# Patient Record
Sex: OTHER/UNKNOWN | Race: WHITE | NOT HISPANIC OR LATINO | Employment: FULL TIME | URBAN - METROPOLITAN AREA
[De-identification: names, ages, dates, MRNs, and addresses within clinical notes are randomized per-mention and may not be internally consistent; named-entity substitution may affect disease eponyms.]

---

## 2020-12-15 LAB — HEP C HIM: NORMAL

## 2021-07-25 ENCOUNTER — OFFICE VISIT (OUTPATIENT)
Dept: URGENT CARE | Facility: URGENT CARE | Age: 26
End: 2021-07-25
Payer: COMMERCIAL

## 2021-07-25 VITALS
OXYGEN SATURATION: 99 % | SYSTOLIC BLOOD PRESSURE: 133 MMHG | DIASTOLIC BLOOD PRESSURE: 87 MMHG | HEART RATE: 96 BPM | TEMPERATURE: 98.7 F | WEIGHT: 185 LBS

## 2021-07-25 DIAGNOSIS — N76.0 BV (BACTERIAL VAGINOSIS): ICD-10-CM

## 2021-07-25 DIAGNOSIS — R30.0 DYSURIA: ICD-10-CM

## 2021-07-25 DIAGNOSIS — N89.8 VAGINAL ITCHING: ICD-10-CM

## 2021-07-25 DIAGNOSIS — B96.89 BV (BACTERIAL VAGINOSIS): ICD-10-CM

## 2021-07-25 DIAGNOSIS — N30.00 ACUTE CYSTITIS WITHOUT HEMATURIA: Primary | ICD-10-CM

## 2021-07-25 LAB
ALBUMIN UR-MCNC: 30 MG/DL
APPEARANCE UR: ABNORMAL
BACTERIA #/AREA URNS HPF: ABNORMAL /HPF
BILIRUB UR QL STRIP: NEGATIVE
CLUE CELLS: PRESENT
COLOR UR AUTO: YELLOW
GLUCOSE UR STRIP-MCNC: NEGATIVE MG/DL
HGB UR QL STRIP: ABNORMAL
KETONES UR STRIP-MCNC: NEGATIVE MG/DL
LEUKOCYTE ESTERASE UR QL STRIP: ABNORMAL
NITRATE UR QL: NEGATIVE
PH UR STRIP: 7 [PH] (ref 5–7)
RBC #/AREA URNS AUTO: ABNORMAL /HPF
SP GR UR STRIP: 1.02 (ref 1–1.03)
TRICHOMONAS, WET PREP: ABNORMAL
UROBILINOGEN UR STRIP-ACNC: 0.2 E.U./DL
WBC #/AREA URNS AUTO: >100 /HPF
WBC CLUMPS #/AREA URNS HPF: PRESENT /HPF
WBC'S/HIGH POWER FIELD, WET PREP: ABNORMAL
YEAST, WET PREP: ABNORMAL

## 2021-07-25 PROCEDURE — 87210 SMEAR WET MOUNT SALINE/INK: CPT | Performed by: PHYSICIAN ASSISTANT

## 2021-07-25 PROCEDURE — 99204 OFFICE O/P NEW MOD 45 MIN: CPT | Performed by: PHYSICIAN ASSISTANT

## 2021-07-25 PROCEDURE — 87186 SC STD MICRODIL/AGAR DIL: CPT | Performed by: PHYSICIAN ASSISTANT

## 2021-07-25 PROCEDURE — 87086 URINE CULTURE/COLONY COUNT: CPT | Performed by: PHYSICIAN ASSISTANT

## 2021-07-25 PROCEDURE — 81001 URINALYSIS AUTO W/SCOPE: CPT | Performed by: PHYSICIAN ASSISTANT

## 2021-07-25 RX ORDER — METRONIDAZOLE 500 MG/1
500 TABLET ORAL 2 TIMES DAILY
Qty: 14 TABLET | Refills: 0 | Status: SHIPPED | OUTPATIENT
Start: 2021-07-25 | End: 2021-08-01

## 2021-07-25 RX ORDER — SULFAMETHOXAZOLE/TRIMETHOPRIM 800-160 MG
1 TABLET ORAL 2 TIMES DAILY
Qty: 14 TABLET | Refills: 0 | Status: SHIPPED | OUTPATIENT
Start: 2021-07-25 | End: 2021-08-01

## 2021-07-25 NOTE — PATIENT INSTRUCTIONS
Patient Education     Bacterial Vaginosis    You have a vaginal infection called bacterial vaginosis (BV). Both good and bad bacteria are present in a healthy vagina. BV occurs when these bacteria get out of balance. The number of bad bacteria increase. And the number of good bacteria decrease. BV is linked with sexual activity, but it's not a sexually transmitted infection (STI).   BV may or may not cause symptoms. If symptoms do occur, they can include:     Thin, gray, milky-white, or sometimes green discharge    Unpleasant odor or  fishy  smell    Itching, burning, or pain in or around the vagina  It is not known what causes BV, but certain factors can make the problem more likely. These can include:     Douching    Spermicides    Use of antibiotics    Change in hormone levels with pregnancy, breastfeeding, or menopause    Having sex with a new partner    Having sex with more than one partner  BV will sometimes go away on its own. But treatment is often advised. This is because untreated BV can raise the risk of more serious health problems such as:     Pelvic inflammatory disease (PID)     delivery (giving birth to a baby early if you re pregnant)    HIV and some other sexually transmitted infections (STIs)    Infection after surgery on the reproductive organs  Home care  General care    BV is most often treated with medicines called antibiotics. These may be given as pills or as a vaginal cream. If antibiotics are prescribed, be sure to use them exactly as directed. And complete all of the medicine, even if your symptoms go away.    Don't douche or having sex during treatment.    If you have sex with a female partner, ask your healthcare provider if she should also be treated.  Prevention    Don't douche.    Don't have sex. If you do have sex, then take steps to lower your risk:  ? Use condoms when having sex.  ? Limit the number of sex partners you have.    Follow-up care  Follow up with your  healthcare provider, or as advised.   When to get medical advice  Call your healthcare provider right away if:     You have a fever of 100.4 F (38 C) or higher, or as directed by your provider.    Your symptoms get worse, or they don t go away within a few days of starting treatment.    You have new pain in the lower belly or pelvic region.    You have side effects that bother you or a reaction to the pills or cream you re prescribed.    You or any of your sex partners have new symptoms, such as a rash, joint pain, or sores.  [employer] last reviewed this educational content on 6/1/2020 2000-2021 [employer]. All rights reserved. This information is not intended as a substitute for professional medical care. Always follow your healthcare professional's instructions.           Patient Education     Bladder Infection, Female (Adult)     Urine normally doesn't have any germs (bacteria) in it. But bacteria can get into the urinary tract from the skin around the rectum. Or they can travel in the blood from other parts of the body. Once they are in your urinary tract, they can cause infection in these areas:    The urethra (urethritis)    The bladder (cystitis)    The kidneys (pyelonephritis)  The most common place for an infection is in the bladder. This is called a bladder infection. This is one of the most common infections in women. Most bladder infections are easily treated. They are not serious unless the infection spreads to the kidney.  The terms bladder infection, UTI, and cystitis are often used to describe the same thing. But they are not always the same. Cystitis is an inflammation of the bladder. The most common cause of cystitis is an infection.  Symptoms  The infection causes inflammation in the urethra and bladder. This causes many of the symptoms. The most common symptoms of a bladder infection are:    Pain or burning when urinating    Having to urinate more often than normal    Urgent need  to urinate    Only a small amount of urine comes out    Blood in urine    Belly (abdominal) discomfort. This is often in the lower belly above the pubic bone.    Cloudy urine    Strong- or bad-smelling urine    Unable to urinate (urinary retention)    Unable to hold urine in (urinary incontinence)    Fever    Loss of appetite    Confusion (in older adults)  Causes  Bladder infections are not contagious. You can't get one from someone else, from a toilet seat, or from sharing a bath.  The most common cause of bladder infections is bacteria from the bowels. The bacteria get onto the skin around the opening of the urethra. From there, they can get into the urine. Then they travel up to the bladder, causing inflammation and infection. This often happens because of:    Wiping incorrectly after urinating. Always wipe from front to back.    Bowel incontinence    Pregnancy    Procedures such as having a catheter put in    Older age    Not emptying your bladder. This can give bacteria a chance to grow in your urine.    Fluid loss (dehydration)    Constipation    Having sex    Using a diaphragm for birth control   Treatment  Bladder infections are diagnosed by a urine test and urine culture. They are treated with antibiotics. They often clear up quickly without problems. Treatment helps prevent a more serious kidney infection.  Medicines  Medicines can help in the treatment of a bladder infection:    Take antibiotics until they are used up, even if you feel better. It's important to finish them to make sure the infection has cleared.    You can use acetaminophen or ibuprofen for pain, fever, or discomfort, unless another medicine was prescribed. If you have long-term (chronic) liver or kidney disease, talk with your healthcare provider before using these medicines. Also talk with your provider if you've ever had a stomach ulcer or GI (gastrointestinal) bleeding, or are taking blood-thinner medicines.    If you are  given phenazopydridine to reduce burning with urination, it will make your urine a bright orange color. This can stain clothing.  Care and prevention  These self-care steps can help prevent future infections:    Drink plenty of fluids. This helps to prevent dehydration and flush out your bladder. Do this unless you must restrict fluids for other health reasons, or your healthcare provider told you not to.    Clean yourself correctly after going to the bathroom. Wipe from front to back after using the toilet. This helps prevent the spread of bacteria.    Urinate more often. Don't try to hold urine in for a long time.    Wear loose-fitting clothes and cotton underwear. Don't wear tight-fitting pants.    Improve your diet and prevent constipation. Eat more fresh fruits and vegetables, and fiber. Eat less junk foods and fatty foods.    Don't have sex until your symptoms are gone.    Don't have caffeine, alcohol, and spicy foods. These can irritate your bladder.    Urinate right after you have sex to flush out your bladder.    If you use birth control pills and have frequent bladder infections, discuss it with your healthcare provider.  Follow-up care  Call your healthcare provider if all symptoms are not gone after 3 days of treatment. This is especially important if you have repeat infections.  If a culture was done, you will be told if your treatment needs to be changed. If directed, you can call to find out the results.  If X-rays were done, you will be told if the results will affect your treatment.  Call 911  Call 911 if any of the following occur:    Trouble breathing    Hard to wake up or confusion    Fainting (loss of consciousness)    Fast heart rate  When to get medical advice  Call your healthcare provider right away if any of these occur:    Fever of 100.4 F (38.0 C) or higher, or as directed by your healthcare provider    Symptoms are not better after 3 days of treatment    Back or belly pain that gets  worse    Repeated vomiting, or unable to keep medicine down    Weakness or dizziness    Vaginal discharge    Pain, redness, or swelling in the outer vaginal area (labia)  [employer] last reviewed this educational content on 11/1/2019 2000-2021 [employer]. All rights reserved. This information is not intended as a substitute for professional medical care. Always follow your healthcare professional's instructions.           Patient Education     Understanding Urinary Tract Infections (UTIs)   Most UTIs are caused by bacteria, but they may also be caused by viruses or fungi. Bacteria from the bowel are the most common source of infection. The infection may start because of any of the following:     Sexual activity.  During sex, bacteria can travel from the penis, vagina, or rectum into the urethra.     Bacteria outside the rectum getting into the urethra.  Bacteria on the skin outside the rectum may travel into the urethra. This is more common in women since the rectum and urethra are closer to each other than in men. Wiping from front to back after using the toilet and keeping the area clean can help prevent germs from getting to the urethra.    Blocked urine flow through the urinary tract. If urine sits too long, germs may start to grow out of control.  Parts of the urinary tract  The infection can occur in any part of the urinary tract.       The kidneys. These organs collect and store urine.    The ureters. These tubes carry urine from the kidneys to the bladder.    The bladder. This holds urine until you are ready to let it out.    The urethra. This tube carries urine from the bladder out of the body. It is shorter in women, so bacteria can move through it more easily. The urethra is longer in men, so a UTI is less likely to reach the bladder or kidneys in men.  [employer] last reviewed this educational content on 1/1/2020 2000-2021 [employer]. All rights reserved. This information  is not intended as a substitute for professional medical care. Always follow your healthcare professional's instructions.

## 2021-07-26 NOTE — PROGRESS NOTES
SUBJECTIVE:   Tato Adler is a 26 year old female who  presents today for a possible UTI. Symptoms of dysuria, urgency, frequency and hesitancy have been going on for 3day(s).  Hematuria no.  still presentand mild.  There is no history of fever, chills, nausea or vomiting.  No history of vaginal or penile discharge. This patient does    have a history of urinary tract infections. Patient denies long duration, rigors, flank pain, temperature > 101 degrees F., Vomiting, significant nausea or diarrhea, taking Coumadin and GFR less than 30 within the last year    No past medical history on file.  Current Outpatient Medications   Medication Sig Dispense Refill     metroNIDAZOLE (FLAGYL) 500 MG tablet Take 1 tablet (500 mg) by mouth 2 times daily for 7 days 14 tablet 0     metroNIDAZOLE (FLAGYL) 500 MG tablet Take 1 tablet (500 mg) by mouth 2 times daily for 7 days 14 tablet 0     sulfamethoxazole-trimethoprim (BACTRIM DS) 800-160 MG tablet Take 1 tablet by mouth 2 times daily for 7 days 14 tablet 0     sulfamethoxazole-trimethoprim (BACTRIM DS) 800-160 MG tablet Take 1 tablet by mouth 2 times daily for 7 days 14 tablet 0     Social History     Tobacco Use     Smoking status: Never Smoker     Smokeless tobacco: Never Used   Substance Use Topics     Alcohol use: Not on file       ROS:   ROS negative except as listed above      OBJECTIVE:  /87   Pulse 96   Temp 98.7  F (37.1  C) (Tympanic)   Wt 83.9 kg (185 lb)   SpO2 99%   GENERAL APPEARANCE: healthy, alert and no distress  RESP: lungs clear to auscultation - no rales, rhonchi or wheezes  CV: regular rates and rhythm, normal S1 S2, no murmur noted  BACK: No CVA tenderness  SKIN: no suspicious lesions or rashes      Results for orders placed or performed in visit on 07/25/21   UA macro with reflex to Microscopic and Culture - [facility] Collect     Status: Abnormal    Specimen: Urine, Clean Catch   Result Value Ref Range    Color Urine Yellow Colorless,  Straw, Light Yellow, Yellow    Appearance Urine Cloudy (A) Clear    Glucose Urine Negative Negative mg/dL    Bilirubin Urine Negative Negative    Ketones Urine Negative Negative mg/dL    Specific Gravity Urine 1.020 1.003 - 1.035    Blood Urine Moderate (A) Negative    pH Urine 7.0 5.0 - 7.0    Protein Albumin Urine 30  (A) Negative mg/dL    Urobilinogen Urine 0.2 0.2, 1.0 E.U./dL    Nitrite Urine Negative Negative    Leukocyte Esterase Urine Large (A) Negative   Urine Microscopic Exam     Status: Abnormal   Result Value Ref Range    Bacteria Urine Few (A) None Seen /HPF    RBC Urine None Seen 0-2 /HPF /HPF    WBC Urine >100 (A) 0-5 /HPF /HPF    WBC Clumps Urine Present (A) None Seen /HPF   Wet prep - Clinic Collect     Status: Abnormal    Specimen: Vagina; Swab   Result Value Ref Range    Trichomonas Absent Absent    Yeast Absent Absent    Clue Cells Present (A) Absent    WBCs/high power field 4+ (A) None       ASSESSMENT:   (N30.00) Acute cystitis without hematuria  (primary encounter diagnosis)  Plan: sulfamethoxazole-trimethoprim (BACTRIM DS)         800-160 MG tablet,         sulfamethoxazole-trimethoprim (BACTRIM DS)         800-160 MG tablet      (R30.0) Dysuria  Plan: UA macro with reflex to Microscopic and Culture        - [facility] Collect, Urine Microscopic Exam, Urine         Culture      (N89.8) Vaginal itching  Plan: Wet prep - Clinic Collect       (N76.0,  B96.89) BV (bacterial vaginosis)  Plan: metroNIDAZOLE (FLAGYL) 500 MG tablet,         metroNIDAZOLE (FLAGYL) 500 MG tablet      Red flags and emergent follow up discussed, and understood by patient  Follow up with PCP if symptoms worsen or fail to improve      Patient Instructions     Patient Education     Bacterial Vaginosis    You have a vaginal infection called bacterial vaginosis (BV). Both good and bad bacteria are present in a healthy vagina. BV occurs when these bacteria get out of balance. The number of bad bacteria increase. And the number of  good bacteria decrease. BV is linked with sexual activity, but it's not a sexually transmitted infection (STI).   BV may or may not cause symptoms. If symptoms do occur, they can include:     Thin, gray, milky-white, or sometimes green discharge    Unpleasant odor or  fishy  smell    Itching, burning, or pain in or around the vagina  It is not known what causes BV, but certain factors can make the problem more likely. These can include:     Douching    Spermicides    Use of antibiotics    Change in hormone levels with pregnancy, breastfeeding, or menopause    Having sex with a new partner    Having sex with more than one partner  BV will sometimes go away on its own. But treatment is often advised. This is because untreated BV can raise the risk of more serious health problems such as:     Pelvic inflammatory disease (PID)     delivery (giving birth to a baby early if you re pregnant)    HIV and some other sexually transmitted infections (STIs)    Infection after surgery on the reproductive organs  Home care  General care    BV is most often treated with medicines called antibiotics. These may be given as pills or as a vaginal cream. If antibiotics are prescribed, be sure to use them exactly as directed. And complete all of the medicine, even if your symptoms go away.    Don't douche or having sex during treatment.    If you have sex with a female partner, ask your healthcare provider if she should also be treated.  Prevention    Don't douche.    Don't have sex. If you do have sex, then take steps to lower your risk:  ? Use condoms when having sex.  ? Limit the number of sex partners you have.    Follow-up care  Follow up with your healthcare provider, or as advised.   When to get medical advice  Call your healthcare provider right away if:     You have a fever of 100.4 F (38 C) or higher, or as directed by your provider.    Your symptoms get worse, or they don t go away within a few days of starting  treatment.    You have new pain in the lower belly or pelvic region.    You have side effects that bother you or a reaction to the pills or cream you re prescribed.    You or any of your sex partners have new symptoms, such as a rash, joint pain, or sores.  [employer] last reviewed this educational content on 6/1/2020 2000-2021 [employer]. All rights reserved. This information is not intended as a substitute for professional medical care. Always follow your healthcare professional's instructions.           Patient Education     Bladder Infection, Female (Adult)     Urine normally doesn't have any germs (bacteria) in it. But bacteria can get into the urinary tract from the skin around the rectum. Or they can travel in the blood from other parts of the body. Once they are in your urinary tract, they can cause infection in these areas:    The urethra (urethritis)    The bladder (cystitis)    The kidneys (pyelonephritis)  The most common place for an infection is in the bladder. This is called a bladder infection. This is one of the most common infections in women. Most bladder infections are easily treated. They are not serious unless the infection spreads to the kidney.  The terms bladder infection, UTI, and cystitis are often used to describe the same thing. But they are not always the same. Cystitis is an inflammation of the bladder. The most common cause of cystitis is an infection.  Symptoms  The infection causes inflammation in the urethra and bladder. This causes many of the symptoms. The most common symptoms of a bladder infection are:    Pain or burning when urinating    Having to urinate more often than normal    Urgent need to urinate    Only a small amount of urine comes out    Blood in urine    Belly (abdominal) discomfort. This is often in the lower belly above the pubic bone.    Cloudy urine    Strong- or bad-smelling urine    Unable to urinate (urinary retention)    Unable to hold urine  in (urinary incontinence)    Fever    Loss of appetite    Confusion (in older adults)  Causes  Bladder infections are not contagious. You can't get one from someone else, from a toilet seat, or from sharing a bath.  The most common cause of bladder infections is bacteria from the bowels. The bacteria get onto the skin around the opening of the urethra. From there, they can get into the urine. Then they travel up to the bladder, causing inflammation and infection. This often happens because of:    Wiping incorrectly after urinating. Always wipe from front to back.    Bowel incontinence    Pregnancy    Procedures such as having a catheter put in    Older age    Not emptying your bladder. This can give bacteria a chance to grow in your urine.    Fluid loss (dehydration)    Constipation    Having sex    Using a diaphragm for birth control   Treatment  Bladder infections are diagnosed by a urine test and urine culture. They are treated with antibiotics. They often clear up quickly without problems. Treatment helps prevent a more serious kidney infection.  Medicines  Medicines can help in the treatment of a bladder infection:    Take antibiotics until they are used up, even if you feel better. It's important to finish them to make sure the infection has cleared.    You can use acetaminophen or ibuprofen for pain, fever, or discomfort, unless another medicine was prescribed. If you have long-term (chronic) liver or kidney disease, talk with your healthcare provider before using these medicines. Also talk with your provider if you've ever had a stomach ulcer or GI (gastrointestinal) bleeding, or are taking blood-thinner medicines.    If you are given phenazopydridine to reduce burning with urination, it will make your urine a bright orange color. This can stain clothing.  Care and prevention  These self-care steps can help prevent future infections:    Drink plenty of fluids. This helps to prevent dehydration and flush out  your bladder. Do this unless you must restrict fluids for other health reasons, or your healthcare provider told you not to.    Clean yourself correctly after going to the bathroom. Wipe from front to back after using the toilet. This helps prevent the spread of bacteria.    Urinate more often. Don't try to hold urine in for a long time.    Wear loose-fitting clothes and cotton underwear. Don't wear tight-fitting pants.    Improve your diet and prevent constipation. Eat more fresh fruits and vegetables, and fiber. Eat less junk foods and fatty foods.    Don't have sex until your symptoms are gone.    Don't have caffeine, alcohol, and spicy foods. These can irritate your bladder.    Urinate right after you have sex to flush out your bladder.    If you use birth control pills and have frequent bladder infections, discuss it with your healthcare provider.  Follow-up care  Call your healthcare provider if all symptoms are not gone after 3 days of treatment. This is especially important if you have repeat infections.  If a culture was done, you will be told if your treatment needs to be changed. If directed, you can call to find out the results.  If X-rays were done, you will be told if the results will affect your treatment.  Call 911  Call 911 if any of the following occur:    Trouble breathing    Hard to wake up or confusion    Fainting (loss of consciousness)    Fast heart rate  When to get medical advice  Call your healthcare provider right away if any of these occur:    Fever of 100.4 F (38.0 C) or higher, or as directed by your healthcare provider    Symptoms are not better after 3 days of treatment    Back or belly pain that gets worse    Repeated vomiting, or unable to keep medicine down    Weakness or dizziness    Vaginal discharge    Pain, redness, or swelling in the outer vaginal area (labia)  [employer] last reviewed this educational content on 11/1/2019 2000-2021 [employer]. All rights  reserved. This information is not intended as a substitute for professional medical care. Always follow your healthcare professional's instructions.           Patient Education     Understanding Urinary Tract Infections (UTIs)   Most UTIs are caused by bacteria, but they may also be caused by viruses or fungi. Bacteria from the bowel are the most common source of infection. The infection may start because of any of the following:     Sexual activity.  During sex, bacteria can travel from the penis, vagina, or rectum into the urethra.     Bacteria outside the rectum getting into the urethra.  Bacteria on the skin outside the rectum may travel into the urethra. This is more common in women since the rectum and urethra are closer to each other than in men. Wiping from front to back after using the toilet and keeping the area clean can help prevent germs from getting to the urethra.    Blocked urine flow through the urinary tract. If urine sits too long, germs may start to grow out of control.  Parts of the urinary tract  The infection can occur in any part of the urinary tract.       The kidneys. These organs collect and store urine.    The ureters. These tubes carry urine from the kidneys to the bladder.    The bladder. This holds urine until you are ready to let it out.    The urethra. This tube carries urine from the bladder out of the body. It is shorter in women, so bacteria can move through it more easily. The urethra is longer in men, so a UTI is less likely to reach the bladder or kidneys in men.  [employer] last reviewed this educational content on 1/1/2020 2000-2021 [employer]. All rights reserved. This information is not intended as a substitute for professional medical care. Always follow your healthcare professional's instructions.

## 2021-07-27 LAB — BACTERIA UR CULT: ABNORMAL

## 2021-07-29 ENCOUNTER — TRANSFERRED RECORDS (OUTPATIENT)
Dept: HEALTH INFORMATION MANAGEMENT | Facility: CLINIC | Age: 26
End: 2021-07-29
Payer: COMMERCIAL

## 2021-07-29 LAB
ALT SERPL-CCNC: 28 U/L (ref 14–59)
AST SERPL-CCNC: 28 U/L (ref 14–59)
C TRACH DNA SPEC QL PROBE+SIG AMP: NEGATIVE
CREATININE (EXTERNAL): 1.14 MG/DL (ref 0.55–1.02)
GFR ESTIMATED (EXTERNAL): >60 ML/MIN/1.73M^2
GFR ESTIMATED (IF AFRICAN AMERICAN) (EXTERNAL): >60 ML/MIN/1.73M^2
GLUCOSE (EXTERNAL): 90 MG/DL (ref 70–124)
HIV 1&2 EXT: NORMAL
N GONORRHOEA DNA SPEC QL PROBE+SIG AMP: NEGATIVE
PAP-ABSTRACT: ABNORMAL
POTASSIUM (EXTERNAL): 4.6 MMOL/L (ref 3.4–5.3)
SPECIMEN DESCRIP: NORMAL
SPECIMEN DESCRIPTION: NORMAL

## 2021-08-03 ENCOUNTER — TRANSFERRED RECORDS (OUTPATIENT)
Dept: HEALTH INFORMATION MANAGEMENT | Facility: CLINIC | Age: 26
End: 2021-08-03
Payer: COMMERCIAL

## 2021-08-06 ENCOUNTER — TRANSFERRED RECORDS (OUTPATIENT)
Dept: HEALTH INFORMATION MANAGEMENT | Facility: CLINIC | Age: 26
End: 2021-08-06
Payer: COMMERCIAL

## 2022-02-14 ENCOUNTER — TRANSFERRED RECORDS (OUTPATIENT)
Dept: HEALTH INFORMATION MANAGEMENT | Facility: CLINIC | Age: 27
End: 2022-02-14
Payer: COMMERCIAL

## 2022-02-14 LAB
C TRACH DNA SPEC QL PROBE+SIG AMP: NEGATIVE
CHOLESTEROL (EXTERNAL): 226 MG/DL
HDLC SERPL-MCNC: 49 MG/DL (ref 35–999)
HIV 1&2 EXT: NORMAL
LDL CHOLESTEROL CALCULATED (EXTERNAL): 149 MG/DL (ref 0–130)
N GONORRHOEA DNA SPEC QL PROBE+SIG AMP: NEGATIVE
SPECIMEN DESCRIP: NORMAL
SPECIMEN DESCRIPTION: NORMAL
TRIGLYCERIDES (EXTERNAL): 141 MG/DL (ref 20–150)
TSH SERPL-ACNC: 3.69 MLU/L (ref 0.4–4.5)

## 2022-02-15 ENCOUNTER — TRANSFERRED RECORDS (OUTPATIENT)
Dept: HEALTH INFORMATION MANAGEMENT | Facility: CLINIC | Age: 27
End: 2022-02-15
Payer: COMMERCIAL

## 2022-03-09 ENCOUNTER — TRANSFERRED RECORDS (OUTPATIENT)
Dept: HEALTH INFORMATION MANAGEMENT | Facility: CLINIC | Age: 27
End: 2022-03-09
Payer: COMMERCIAL

## 2022-04-20 ENCOUNTER — OFFICE VISIT (OUTPATIENT)
Dept: FAMILY MEDICINE | Facility: CLINIC | Age: 27
End: 2022-04-20
Payer: COMMERCIAL

## 2022-04-20 VITALS
DIASTOLIC BLOOD PRESSURE: 80 MMHG | BODY MASS INDEX: 30.01 KG/M2 | SYSTOLIC BLOOD PRESSURE: 124 MMHG | TEMPERATURE: 98.6 F | OXYGEN SATURATION: 94 % | HEIGHT: 67 IN | WEIGHT: 191.2 LBS | HEART RATE: 85 BPM

## 2022-04-20 DIAGNOSIS — F43.0 ACUTE REACTION TO STRESS: ICD-10-CM

## 2022-04-20 DIAGNOSIS — E78.2 MIXED HYPERLIPIDEMIA: ICD-10-CM

## 2022-04-20 DIAGNOSIS — F32.A DEPRESSION, UNSPECIFIED DEPRESSION TYPE: ICD-10-CM

## 2022-04-20 DIAGNOSIS — F64.9 GENDER DYSPHORIA: Primary | ICD-10-CM

## 2022-04-20 LAB
ALBUMIN SERPL-MCNC: 4 G/DL (ref 3.4–5)
ALP SERPL-CCNC: 68 U/L (ref 40–150)
ALT SERPL W P-5'-P-CCNC: 32 U/L (ref 0–70)
ANION GAP SERPL CALCULATED.3IONS-SCNC: 8 MMOL/L (ref 3–14)
AST SERPL W P-5'-P-CCNC: 23 U/L (ref 0–45)
BILIRUB SERPL-MCNC: 0.5 MG/DL (ref 0.2–1.3)
BUN SERPL-MCNC: 13 MG/DL (ref 7–30)
CALCIUM SERPL-MCNC: 9.4 MG/DL (ref 8.5–10.1)
CHLORIDE BLD-SCNC: 107 MMOL/L (ref 94–109)
CHOLEST SERPL-MCNC: 252 MG/DL
CO2 SERPL-SCNC: 26 MMOL/L (ref 20–32)
CREAT SERPL-MCNC: 0.78 MG/DL (ref 0.52–1.25)
FASTING STATUS PATIENT QL REPORTED: ABNORMAL
GFR SERPL CREATININE-BSD FRML MDRD: >90 ML/MIN/1.73M2
GLUCOSE BLD-MCNC: 85 MG/DL (ref 70–99)
HDLC SERPL-MCNC: 68 MG/DL
HGB BLD-MCNC: 16.8 G/DL (ref 11.7–17.7)
LDLC SERPL CALC-MCNC: 130 MG/DL
NONHDLC SERPL-MCNC: 184 MG/DL
POTASSIUM BLD-SCNC: 4.1 MMOL/L (ref 3.4–5.3)
PROT SERPL-MCNC: 7.6 G/DL (ref 6.8–8.8)
SODIUM SERPL-SCNC: 141 MMOL/L (ref 133–144)
TRIGL SERPL-MCNC: 271 MG/DL

## 2022-04-20 PROCEDURE — 36415 COLL VENOUS BLD VENIPUNCTURE: CPT | Performed by: STUDENT IN AN ORGANIZED HEALTH CARE EDUCATION/TRAINING PROGRAM

## 2022-04-20 PROCEDURE — 99214 OFFICE O/P EST MOD 30 MIN: CPT | Performed by: STUDENT IN AN ORGANIZED HEALTH CARE EDUCATION/TRAINING PROGRAM

## 2022-04-20 PROCEDURE — 85018 HEMOGLOBIN: CPT | Performed by: STUDENT IN AN ORGANIZED HEALTH CARE EDUCATION/TRAINING PROGRAM

## 2022-04-20 PROCEDURE — 80053 COMPREHEN METABOLIC PANEL: CPT | Performed by: STUDENT IN AN ORGANIZED HEALTH CARE EDUCATION/TRAINING PROGRAM

## 2022-04-20 PROCEDURE — 80061 LIPID PANEL: CPT | Performed by: STUDENT IN AN ORGANIZED HEALTH CARE EDUCATION/TRAINING PROGRAM

## 2022-04-20 PROCEDURE — 84403 ASSAY OF TOTAL TESTOSTERONE: CPT | Mod: KX | Performed by: STUDENT IN AN ORGANIZED HEALTH CARE EDUCATION/TRAINING PROGRAM

## 2022-04-20 RX ORDER — TESTOSTERONE CYPIONATE 200 MG/ML
70 INJECTION, SOLUTION INTRAMUSCULAR WEEKLY
Qty: 10 ML | Refills: 0 | Status: SHIPPED | OUTPATIENT
Start: 2022-04-20 | End: 2022-05-18

## 2022-04-20 RX ORDER — HYDROXYZINE HYDROCHLORIDE 25 MG/1
TABLET, FILM COATED ORAL
COMMUNITY
Start: 2022-03-08 | End: 2022-11-08

## 2022-04-20 RX ORDER — SERTRALINE HYDROCHLORIDE 100 MG/1
100 TABLET, FILM COATED ORAL DAILY
Qty: 30 TABLET | Refills: 3 | Status: SHIPPED | OUTPATIENT
Start: 2022-04-20 | End: 2022-05-19

## 2022-04-20 RX ORDER — TESTOSTERONE CYPIONATE 200 MG/ML
INJECTION, SOLUTION INTRAMUSCULAR
COMMUNITY
Start: 2018-04-20 | End: 2022-04-20

## 2022-04-20 RX ORDER — MULTIVITAMIN
TABLET ORAL
COMMUNITY

## 2022-04-20 ASSESSMENT — ANXIETY QUESTIONNAIRES
7. FEELING AFRAID AS IF SOMETHING AWFUL MIGHT HAPPEN: SEVERAL DAYS
IF YOU CHECKED OFF ANY PROBLEMS ON THIS QUESTIONNAIRE, HOW DIFFICULT HAVE THESE PROBLEMS MADE IT FOR YOU TO DO YOUR WORK, TAKE CARE OF THINGS AT HOME, OR GET ALONG WITH OTHER PEOPLE: SOMEWHAT DIFFICULT
3. WORRYING TOO MUCH ABOUT DIFFERENT THINGS: SEVERAL DAYS
GAD7 TOTAL SCORE: 7
5. BEING SO RESTLESS THAT IT IS HARD TO SIT STILL: NOT AT ALL
1. FEELING NERVOUS, ANXIOUS, OR ON EDGE: SEVERAL DAYS
2. NOT BEING ABLE TO STOP OR CONTROL WORRYING: MORE THAN HALF THE DAYS
6. BECOMING EASILY ANNOYED OR IRRITABLE: SEVERAL DAYS

## 2022-04-20 ASSESSMENT — PATIENT HEALTH QUESTIONNAIRE - PHQ9
SUM OF ALL RESPONSES TO PHQ QUESTIONS 1-9: 13
5. POOR APPETITE OR OVEREATING: SEVERAL DAYS

## 2022-04-20 NOTE — PATIENT INSTRUCTIONS
Patient Education   Here is the plan from today's visit    Send me your immunization records when you have a chance!     1. Gender dysphoria  Labs today, testosterone refill, gender referral to discuss surgery. If you don't hear from the gender referral, this is the coordinator to call and follow up with:     Sherwin Diaz, they/them  Intake and Referral Coordinator  Com[facility] Gender Care Program   [phone]     - Comprehensive Gender Care Referral - Internal; Future  - testosterone cypionate (DEPOTESTOSTERONE) 200 MG/ML injection; Inject 0.35 mLs (70 mg) into the muscle once a week  Dispense: 10 mL; Refill: 0  - Testosterone Total; Future  - Lipid Cascade; Future  - Comprehensive metabolic panel; Future  - Hemoglobin; Future  - Testosterone Total  - Lipid Cascade  - Comprehensive metabolic panel  - Hemoglobin    2. Depression, unspecified depression type  Dose increase and referral for short-term therapy   - sertraline (ZOLOFT) 100 MG tablet; Take 1 tablet (100 mg) by mouth daily  Dispense: 30 tablet; Refill: 3    3. Acute reaction to stress    4. Mixed hyperlipidemia  We'll check cholesterol levels w T labs           Please call or return to clinic if your symptoms don't go away.    Follow up plan  Return in about 4 weeks (around 5/18/2022).    Thank you for coming to [facility] today.  Lab Testing:  **If you had lab testing today and your results are reassuring or normal they will be mailed to you or sent through [employer] within 7 days.   **If the lab tests need quick action we will call you with the results.  **If you are having labs done on a different day, please call [phone] to schedule at [facility] or [phone] for other [facility] locations. Labs do not offer walk-in appointments.  The phone number we will call with results is # [phone] (home) . If this is not the best number please call our clinic and change the number.  Medication Refills:  If you need any  refills please call your pharmacy and they will contact us.   If you need to  your refill at a new pharmacy, please contact the new pharmacy directly. The new pharmacy will help you get your medications transferred faster.   Scheduling:  If you have any concerns about today's visit or wish to schedule another appointment please call our office during normal business hours [phone] (8-5:00 M-F)  If a referral was made to an [facility] specialty provider and you do not get a call from central scheduling, please refer to directions on your visit summary or call our office during normal business hours for assistance.   If a Mammogram was ordered for you at the [facility] call [phone] to schedule or change your appointment.  If you had an XRay/CT/Ultrasound/MRI ordered the number is [phone] to schedule or change your radiology appointment.   [facility] has limited ultrasound appointments available on Wednesdays, if you would like your ultrasound at [facility], please call [phone] to schedule.   Medical Concerns:  If you have urgent medical concerns please call [phone] at any time of the day.    Ilene Manzo,
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
## 2022-04-21 ASSESSMENT — ANXIETY QUESTIONNAIRES: GAD7 TOTAL SCORE: 7

## 2022-04-22 ENCOUNTER — OFFICE VISIT (OUTPATIENT)
Dept: PSYCHOLOGY | Facility: CLINIC | Age: 27
End: 2022-04-22
Payer: COMMERCIAL

## 2022-04-22 DIAGNOSIS — F43.23 ADJUSTMENT DISORDER WITH MIXED ANXIETY AND DEPRESSED MOOD: Primary | ICD-10-CM

## 2022-04-22 LAB — TESTOST SERPL-MCNC: 915 NG/DL (ref 8–950)

## 2022-04-22 PROCEDURE — 90834 PSYTX W PT 45 MINUTES: CPT | Mod: HN | Performed by: PSYCHOLOGIST

## 2022-04-22 NOTE — PATIENT INSTRUCTIONS
[facility]  Behavioral Health and Addiction Services  [address] [place] [address]  [phone]      First Session Patient Information Sheet    My name is Allan Caruso, Ph.D. and I look forward to working with you! I earned my Ph.D. in Counseling Psychology at [employer]. I am currently in a postdoctoral fellowship at [facility] to receive specialized training in primary care behavioral health. I am also working to complete my supervised hours to become a licensed psychologist in the [facility].     My direct supervisor at the [facility] is licensed psychologist, Jayna Goodwin Psy.D. She and I meet individually each week to discuss my training and clinical caseload. Just like the residents you may have worked with, the work you and I complete together will be billed under my supervisor's name. Therefore, you will likely see reports from your insurance provider with Dr. Goodwin's name rather than mine. Dr. Goodwin can be reached at [phone] if you have any questions or concerns.       Here is some general information about behavioral health services. Please note that your exact experience may be different based on our discussion today.    Your first 1-2 sessions are focused on assessment.This means that I will be exploring what brings you in today and what you are hoping to get out of behavioral health services. I will likely ask you a lot of questions about your current symptoms, health history, as well as information about your relationships, emotions, behaviors, experiences, childhood, family of origin, etc. I may also ask you to complete several questionnaires and checklists as part of that assessment process.    When the assessment is completed, we will review the results and work together to develop a plan for treatment. This discussion will include recommendations for services that are most appropriate for you based  on available research and practice guidelines. Many times, I will continue with your care unless it becomes clear that we need to refer you to another provider or organization better suited to meet your specific needs. We may also discuss other services that you may benefit from engaging with, such as the social work team or the clinic's legal partnership.    Most patients attend appointments once a week or once every other week at the beginning of treatment. However, we will discuss a schedule that best fits your needs.    If you get primary care services here at the clinic, I will also update your provider about your diagnosis and treatment plan to coordinate your care.    Due to confidentiality, I cannot exchange e-mail with patients. If you need to reach me, please leave a message through the  [phone].    I am committed to providing my patients with the best care possible. That means it is important to me that I provider services that fit for your unique needs and preferences. If there is anything you would like me to do differently, please let me know at any time!    Thank you, and I look forward to working with you!    Allan Caruso, PhD  She/her/hers  Primary Care Behavioral Health Fellow
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
## 2022-04-23 ENCOUNTER — OFFICE VISIT (OUTPATIENT)
Dept: URGENT CARE | Facility: URGENT CARE | Age: 27
End: 2022-04-23
Payer: COMMERCIAL

## 2022-04-23 VITALS
TEMPERATURE: 98 F | SYSTOLIC BLOOD PRESSURE: 137 MMHG | DIASTOLIC BLOOD PRESSURE: 88 MMHG | HEART RATE: 85 BPM | OXYGEN SATURATION: 97 % | HEIGHT: 65 IN | BODY MASS INDEX: 31.65 KG/M2 | WEIGHT: 190 LBS

## 2022-04-23 DIAGNOSIS — Z11.3 SCREEN FOR STD (SEXUALLY TRANSMITTED DISEASE): Primary | ICD-10-CM

## 2022-04-23 LAB
ALBUMIN UR-MCNC: 30 MG/DL
APPEARANCE UR: CLEAR
BACTERIA #/AREA URNS HPF: ABNORMAL /HPF
BILIRUB UR QL STRIP: NEGATIVE
CLUE CELLS: ABNORMAL
COLOR UR AUTO: YELLOW
GLUCOSE UR STRIP-MCNC: NEGATIVE MG/DL
HGB UR QL STRIP: ABNORMAL
KETONES UR STRIP-MCNC: NEGATIVE MG/DL
LEUKOCYTE ESTERASE UR QL STRIP: ABNORMAL
NITRATE UR QL: NEGATIVE
PH UR STRIP: 6.5 [PH] (ref 5–7)
RBC #/AREA URNS AUTO: ABNORMAL /HPF
SP GR UR STRIP: 1.02 (ref 1–1.03)
SQUAMOUS #/AREA URNS AUTO: ABNORMAL /LPF
TRICHOMONAS, WET PREP: ABNORMAL
UROBILINOGEN UR STRIP-ACNC: 0.2 E.U./DL
WBC #/AREA URNS AUTO: ABNORMAL /HPF
WBC'S/HIGH POWER FIELD, WET PREP: ABNORMAL
YEAST, WET PREP: ABNORMAL

## 2022-04-23 PROCEDURE — 36415 COLL VENOUS BLD VENIPUNCTURE: CPT | Performed by: PHYSICIAN ASSISTANT

## 2022-04-23 PROCEDURE — 87591 N.GONORRHOEAE DNA AMP PROB: CPT | Performed by: PHYSICIAN ASSISTANT

## 2022-04-23 PROCEDURE — 86696 HERPES SIMPLEX TYPE 2 TEST: CPT | Performed by: PHYSICIAN ASSISTANT

## 2022-04-23 PROCEDURE — 96372 THER/PROPH/DIAG INJ SC/IM: CPT | Performed by: PHYSICIAN ASSISTANT

## 2022-04-23 PROCEDURE — 87491 CHLMYD TRACH DNA AMP PROBE: CPT | Performed by: PHYSICIAN ASSISTANT

## 2022-04-23 PROCEDURE — 86695 HERPES SIMPLEX TYPE 1 TEST: CPT | Performed by: PHYSICIAN ASSISTANT

## 2022-04-23 PROCEDURE — 81001 URINALYSIS AUTO W/SCOPE: CPT | Performed by: PHYSICIAN ASSISTANT

## 2022-04-23 PROCEDURE — 86803 HEPATITIS C AB TEST: CPT | Performed by: PHYSICIAN ASSISTANT

## 2022-04-23 PROCEDURE — 87389 HIV-1 AG W/HIV-1&-2 AB AG IA: CPT | Performed by: PHYSICIAN ASSISTANT

## 2022-04-23 PROCEDURE — 99214 OFFICE O/P EST MOD 30 MIN: CPT | Mod: 25 | Performed by: PHYSICIAN ASSISTANT

## 2022-04-23 PROCEDURE — 87210 SMEAR WET MOUNT SALINE/INK: CPT

## 2022-04-23 PROCEDURE — 86780 TREPONEMA PALLIDUM: CPT | Performed by: PHYSICIAN ASSISTANT

## 2022-04-23 RX ORDER — DOXYCYCLINE 100 MG/1
100 CAPSULE ORAL 2 TIMES DAILY
Qty: 14 CAPSULE | Refills: 0 | Status: SHIPPED | OUTPATIENT
Start: 2022-04-23 | End: 2022-04-30
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
## 2022-04-25 ENCOUNTER — NURSE TRIAGE (OUTPATIENT)
Dept: NURSING | Facility: CLINIC | Age: 27
End: 2022-04-25
Payer: COMMERCIAL

## 2022-04-25 LAB
C TRACH DNA SPEC QL NAA+PROBE: NEGATIVE
HCV AB SERPL QL IA: NONREACTIVE
HIV 1+2 AB+HIV1 P24 AG SERPL QL IA: NONREACTIVE
HSV1 IGG SERPL QL IA: 1.14 INDEX
HSV1 IGG SERPL QL IA: ABNORMAL
HSV2 IGG SERPL QL IA: 0.18 INDEX
HSV2 IGG SERPL QL IA: ABNORMAL
N GONORRHOEA DNA SPEC QL NAA+PROBE: NEGATIVE
T PALLIDUM AB SER QL: NONREACTIVE

## 2022-04-25 NOTE — PROGRESS NOTES
Preceptor Attestation:  I have reviewed and agree with the behavioral health fellow's documentation for this visit.  I did not see the patient.  Supervising Clinical Psychologist:  Jayna Goodwin PSYD LP

## 2022-04-25 NOTE — TELEPHONE ENCOUNTER
TELEPHONE CALL -    Reason for call   questions about test results  - Dr already called and gave his results   Care advise given and caller s questions were answered  Reminded we will be here 24/7 and can call back and ask to speak with a nurse. Estefany Cifuentes RN [place] Nurse Advisor,  6:42 PM 4/25/2022

## 2022-04-29 ENCOUNTER — OFFICE VISIT (OUTPATIENT)
Dept: PSYCHOLOGY | Facility: CLINIC | Age: 27
End: 2022-04-29
Payer: COMMERCIAL

## 2022-04-29 DIAGNOSIS — F43.23 ADJUSTMENT DISORDER WITH MIXED ANXIETY AND DEPRESSED MOOD: Primary | ICD-10-CM

## 2022-04-29 PROCEDURE — 90834 PSYTX W PT 45 MINUTES: CPT | Mod: HN | Performed by: PSYCHOLOGIST
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
## 2022-05-06 ENCOUNTER — OFFICE VISIT (OUTPATIENT)
Dept: PSYCHOLOGY | Facility: CLINIC | Age: 27
End: 2022-05-06
Payer: COMMERCIAL

## 2022-05-06 DIAGNOSIS — F43.10 PTSD (POST-TRAUMATIC STRESS DISORDER): ICD-10-CM

## 2022-05-06 DIAGNOSIS — F33.1 MAJOR DEPRESSIVE DISORDER, RECURRENT, MODERATE (H): Primary | ICD-10-CM

## 2022-05-06 PROCEDURE — 90791 PSYCH DIAGNOSTIC EVALUATION: CPT | Mod: HN | Performed by: PSYCHOLOGIST
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
## 2022-05-06 NOTE — PATIENT INSTRUCTIONS
Your safety as well as the safety of those around you is important to us. Should you or someone else be in need of them, here are some additional resources:    Feeling overwhelmed and just need a supportive person to talk through a struggling time? (hours 12 PM - 10 PM CST)  Toll Free [phone] or text  Support  to 77106  The [place] Warmline provides a zmdu-vz-luoa approach to mental health recovery, support and wellness. Calls are answered by professionally trained Certified Peer Specialists, who have first hand experience living with a mental health condition. (hours 12 PM - 10 PM CST)    Do you feel like you might be in crisis and potentially need professional services?   [facility] Adult Mental Health Crisis:  [phone]  [facility] Childrens Mental Health Crisis: [phone]    [facility] Adult Mental Health Crisis: [phone]  [facility] Children's Mental Health Crisis: [phone]    [facility] Multilingual Crisis Line:  [phone]     Crisis Text Line: People who text [place] to 433137 will be connected with a counselor.     [place] Domestic Violence Crisis Line (24-hour [place] crisis line) 1-721.838.5739    Find a local Alcohol or Narcotics Anonymous meeting:  Https://aam[place].org/  https://www.nam[place].org/    If you feel at risk of immediate harm, call 9-1-1 or go directly to the Emergency Department.

## 2022-05-13 ENCOUNTER — OFFICE VISIT (OUTPATIENT)
Dept: PSYCHOLOGY | Facility: CLINIC | Age: 27
End: 2022-05-13
Payer: COMMERCIAL

## 2022-05-13 DIAGNOSIS — F43.10 PTSD (POST-TRAUMATIC STRESS DISORDER): ICD-10-CM

## 2022-05-13 DIAGNOSIS — F33.1 MODERATE EPISODE OF RECURRENT MAJOR DEPRESSIVE DISORDER (H): Primary | ICD-10-CM

## 2022-05-13 PROCEDURE — 90834 PSYTX W PT 45 MINUTES: CPT | Mod: HN | Performed by: PSYCHOLOGIST

## 2022-05-13 ASSESSMENT — PATIENT HEALTH QUESTIONNAIRE - PHQ9: SUM OF ALL RESPONSES TO PHQ QUESTIONS 1-9: 12
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
## 2022-05-18 ENCOUNTER — OFFICE VISIT (OUTPATIENT)
Dept: FAMILY MEDICINE | Facility: CLINIC | Age: 27
End: 2022-05-18
Payer: COMMERCIAL

## 2022-05-18 VITALS
TEMPERATURE: 98.3 F | RESPIRATION RATE: 16 BRPM | DIASTOLIC BLOOD PRESSURE: 79 MMHG | WEIGHT: 189.6 LBS | HEART RATE: 90 BPM | SYSTOLIC BLOOD PRESSURE: 135 MMHG | BODY MASS INDEX: 31.55 KG/M2 | OXYGEN SATURATION: 96 %

## 2022-05-18 DIAGNOSIS — F43.0 ACUTE REACTION TO STRESS: ICD-10-CM

## 2022-05-18 DIAGNOSIS — L74.510 AXILLARY HYPERHIDROSIS: ICD-10-CM

## 2022-05-18 DIAGNOSIS — F64.9 GENDER DYSPHORIA: ICD-10-CM

## 2022-05-18 DIAGNOSIS — F32.A DEPRESSION, UNSPECIFIED DEPRESSION TYPE: Primary | ICD-10-CM

## 2022-05-18 PROCEDURE — 99214 OFFICE O/P EST MOD 30 MIN: CPT | Performed by: STUDENT IN AN ORGANIZED HEALTH CARE EDUCATION/TRAINING PROGRAM

## 2022-05-18 RX ORDER — TESTOSTERONE CYPIONATE 200 MG/ML
50 INJECTION, SOLUTION INTRAMUSCULAR WEEKLY
Qty: 10 ML | Refills: 0 | COMMUNITY
Start: 2022-05-18 | End: 2022-11-08

## 2022-05-18 ASSESSMENT — ANXIETY QUESTIONNAIRES
7. FEELING AFRAID AS IF SOMETHING AWFUL MIGHT HAPPEN: NOT AT ALL
5. BEING SO RESTLESS THAT IT IS HARD TO SIT STILL: NOT AT ALL
2. NOT BEING ABLE TO STOP OR CONTROL WORRYING: NOT AT ALL
3. WORRYING TOO MUCH ABOUT DIFFERENT THINGS: NOT AT ALL
IF YOU CHECKED OFF ANY PROBLEMS ON THIS QUESTIONNAIRE, HOW DIFFICULT HAVE THESE PROBLEMS MADE IT FOR YOU TO DO YOUR WORK, TAKE CARE OF THINGS AT HOME, OR GET ALONG WITH OTHER PEOPLE: NOT DIFFICULT AT ALL
1. FEELING NERVOUS, ANXIOUS, OR ON EDGE: SEVERAL DAYS
GAD7 TOTAL SCORE: 3
6. BECOMING EASILY ANNOYED OR IRRITABLE: SEVERAL DAYS
GAD7 TOTAL SCORE: 3

## 2022-05-18 ASSESSMENT — PATIENT HEALTH QUESTIONNAIRE - PHQ9
5. POOR APPETITE OR OVEREATING: SEVERAL DAYS
SUM OF ALL RESPONSES TO PHQ QUESTIONS 1-9: 11

## 2022-05-18 NOTE — COMMUNITY RESOURCES LIST (ENGLISH)
05/18/2022   [facility] - Outpatient Clinics  Yulissa Vincent  For questions about this resource list or additional care needs, please contact your primary care clinic or care manager.  Phone: [phone]   Email: N/A   Address: [address] [place] [address]   Hours: N/A        Hotlines and Helplines       Hotline - Crisis help  1  [facility] - Emergency  Communications Distance: 1.06 miles      COVID-19 Status: Phone/Virtual   [address] [place] [address]  Language: English, Argentine  Hours: Mon - Sun Open 24 Hours   Phone: (592) 450-9097 Email: [email or web address] Website: https://www.[employer]/local/[place]-nd-sd/about-us/locations/[facility].html     2  [facility] 24/7 Mobile Crisis Services - Adult crisis (18+) Distance: 1.64 miles      COVID-19 Status: Phone/Virtual   [address] [place] [address]  Language: English  Hours: Mon - Sun Open 24 Hours   Phone: (212) [phone] Website: [email or web address]://www.[facility]/residents/emergencies/mental-health-emergencies          Mental Health       Individual counseling  3  [facility] Distance: 0.51 miles      COVID-19 Status: Regular Operations, COVID-19 Status: Phone/Virtual   [address] [place] [address]  Language: English, Chinese  Hours: Mon - Fri 9:00 AM - 5:00 PM  Fees: Insurance, Self Pay   Phone: (136) 154-3998 Website: [email or web address]     4  [facility] Distance: 0.52 miles      COVID-19 Status: Phone/Virtual   [address] [place] [address]  Language: English, Argentine  Hours: Mon - Fri 8:00 AM - 5:00 PM  Fees: Insurance, Self Pay   Phone: (259) 496-6509 Email: [email or web address].org Website: [email or web address]/Services/Counseling-Centers     Mental health crisis care  5  [facility] Distance: 0.51 miles      COVID-19  Status: Regular Operations, COVID-19 Status: Phone/Virtual   [address] [place] [address]  Language: English, Filipino  Hours: Mon - Fri 9:00 AM - 5:00 PM  Fees: Insurance, Self Pay   Phone: (517) 920-6979 Website: [email or web address]     6  [facility] - Children's Crisis Response Services Distance: 1.64 miles      COVID-19 Status: Regular Operations, COVID-19 Status: Phone/Virtual   [address] [place] [address]  Language: English, Hmong, Filipino, Tajik  Hours: Mon - Sun Open 24 Hours  Fees: Free, Insurance   Phone: (246) 193-7822 Website: http://www.[facility]./residents/health-medical/childrens-mental-health-services#child-crisis-services     Mental health support group  7  [facility] Distance: 1.55 miles      COVID-19 Status: Phone/Virtual   [address] [place] [address]  Language: English  Hours: Mon 9:00 AM - 5:00 PM , Tue 9:30 AM - 5:00 PM , Wed - Fri 9:00 AM - 5:00 PM  Fees: Insurance, Self Pay, Sliding Fee   Phone: (744) 231-2939 Email: [email or web address].[email or web address]     8  [facility] ([facility]) - Mental/Chemical Health Support Group Distance: 1.74 miles      COVID-19 Status: Phone/Virtual   [address] [address] [place] [address]  Language: English, Filipino, Tajik  Hours: Mon 8:00 AM - 5:00 PM , Tue 9:30 AM - 5:00 PM , Wed 8:00 AM - 6:00 PM , Thu - Fri 8:00 AM - 5:00 PM  Fees: Free   Phone: (881) 973-2976 Email: [email or web address]. Website: [email or web address]://www.[email or web address]./residents/health-medical/adult-mental-health-services          Important Numbers & Websites       Emergency Services   911  City Services   311  Poison Control   (214) 861-2946  Suicide Prevention Lifeline   (622) 188-1320 (TALK)  Child Abuse Hotline   (728) 548-1743 (4-A-Child)  Sexual Assault Hotline   (355) 908-2047 (HOPE)  National Runaway Safeline   (538) 984-6870 (RUNAWAY)  All-Options Talkline    (323) 846-2826  Substance Abuse Referral   (657) 867-1586 (HELP)

## 2022-05-19 DIAGNOSIS — F32.A DEPRESSION, UNSPECIFIED DEPRESSION TYPE: ICD-10-CM

## 2022-05-19 RX ORDER — SERTRALINE HYDROCHLORIDE 100 MG/1
100 TABLET, FILM COATED ORAL DAILY
Qty: 30 TABLET | Refills: 11 | Status: SHIPPED | OUTPATIENT
Start: 2022-05-19 | End: 2022-11-08

## 2022-05-19 NOTE — TELEPHONE ENCOUNTER
"Request for medication refill: sertraline (ZOLOFT) 100 MG tablet    Providers if patient needs an appointment and you are willing to give a one month supply please refill for one month and  send a letter/MyChart using \".SMILLIMITEDREFILL\" .smillimited and route chart to \"[facility] \" (Giving one month refill in non controlled medications is strongly recommended before denial)    If refill has been denied, meaning absolutely no refills without visit, please complete the smart phrase \".smirxrefuse\" and route it to the \"P [facility] MED REFILLS\"  pool to inform the patient and the pharmacy.    Eliza Osman        "

## 2022-05-20 ENCOUNTER — TELEPHONE (OUTPATIENT)
Dept: FAMILY MEDICINE | Facility: CLINIC | Age: 27
End: 2022-05-20
Payer: COMMERCIAL

## 2022-05-20 ENCOUNTER — OFFICE VISIT (OUTPATIENT)
Dept: PSYCHOLOGY | Facility: CLINIC | Age: 27
End: 2022-05-20
Payer: COMMERCIAL

## 2022-05-20 DIAGNOSIS — F33.1 MODERATE EPISODE OF RECURRENT MAJOR DEPRESSIVE DISORDER (H): Primary | ICD-10-CM

## 2022-05-20 DIAGNOSIS — F43.10 PTSD (POST-TRAUMATIC STRESS DISORDER): ICD-10-CM

## 2022-05-20 PROCEDURE — 90834 PSYTX W PT 45 MINUTES: CPT | Mod: HN | Performed by: PSYCHOLOGIST

## 2022-05-20 NOTE — TELEPHONE ENCOUNTER
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

## 2022-05-20 NOTE — PROGRESS NOTES
Behavioral Health Progress Note    Client's Legal Name: Tato Adler    Client's Preferred Name: Tato  YOB: 1995  Type of Service: in-person, counseling  Length of Service:   Start time: 8:25AM   End time: 9:10AM   Duration: 45 minutes  Attendees: patient    Identifying Information and Presenting Problem:  Tato is a 27 year old adult being seen for problematic symptoms of depression, and tauma in the context of recent traumatic event.    Treatment Objective(s) Addressed in This Session:  Depressed Mood: Increase interest, engagement, and pleasure in doing things    Progress on / Status of Treatment Objective(s) / Homework:  Satisfactory progress     Mental Health Screening Questionnaires:  PHQ-9:   PHQ 4/20/2022 5/13/2022 5/18/2022   PHQ-9 Total Score 13 12 11   Q9: Thoughts of better off dead/self-harm past 2 weeks Several days Several days Several days      ROWDY-7:   ROWDY-7 SCORE 4/20/2022 5/18/2022   Total Score 7 3       Topics Discussed/Interventions Provided:    Romantic dates: Tato went on a first and second date with someone this week. Processed feelings and discussed boundary setting. He expressed feeling good about having dates, which was affirming.     Drinking: patients feels he has been drinking less this week, which has felt good. Processed factors which contributed, including spending time with new romantic connection. Explored drinking during social events.      Home: Tato is visiting family and friends in [place]. Processed father misgendering him. He avoids correcting dad to protect mom's feelings, who will feel bad for not supporting Tato enough.     Provided empathetic listening and relational processing. Discussed self-care changes to use while visiting family.     Assessment:   The patient appeared to be active and engaged in today's session and was receptive to feedback.     Mental Status:   During interaction with the examiner today, Tato was  "cooperative, open, engaged and pleasant. Patient was generally alert and oriented to person, place, time, and situation. They were casually dressed, appropriately groomed and appeared stated age. Patient's attire was appropriate for the weather and occasion. Eye contact was good. Psychomotor functioning: normal or unremarkable. Speech was normal limits tone, rate, volume; largely coherent and relevant to topic. Mood was \"good\"; affect was mood-congruent. Thought processes were unremarkable. Thought content was not remarkable with no evidence of psychotic features and no evidence of suicide, homicide, or nonsuicidal self injury related thoughts, intent, urges, planning, behavior/recent attempts. Memory appeared grossly intact without being formally evaluated. Insight: good. Judgment was good. Patient exhibited good impulse control during the appointment.     Does the patient appear to be at imminent risk of harm to self/others at this time? No    The session was necessary to address symptoms of trauma and low mood that have been interfering with patient's ability to function in areas related to interacting and relating with others, maintaining personal health and physical well-being and participating in meaningful activities. Ongoing psychotherapy is necessary to provide counseling.    DSM-5 Diagnosis:  Major Depressive Disorder, Recurrent, Moderate   Other Stressor or Trauma-Related Disorder     Plan:  1. Follow up with this provider on 6/3  2. After Visit Summary will be reviewed in Bonny BLANCO, PhD    NOTE: Treatment plan due next session.  Diagnostic assessment update due 5/05/2023.          "

## 2022-05-23 NOTE — PROGRESS NOTES
Preceptor Attestation:  I have reviewed and agree with the behavioral health fellow's documentation for this visit.  I did not see the patient.  Supervising Clinical Psychologist:  Jayna Goodwin PSYD LP       [id number]

## 2022-05-26 NOTE — TELEPHONE ENCOUNTER
Central Prior Authorization Team   Phone: [phone]      PA Initiation    Medication: Glycopyrronium Tosylate (QBREXZA) 2.4 % PADS  Insurance Company: [employer] - Phone [phone] Fax [phone]  Pharmacy Filling the Rx: [id number] - [place], [place] - [address]   Filling Pharmacy Phone: [phone]  Filling Pharmacy Fax:    Start Date: 5/26/2022

## 2022-05-26 NOTE — TELEPHONE ENCOUNTER
PRIOR AUTHORIZATION DENIED    Medication: Glycopyrronium Tosylate (QBREXZA) 2.4 % PADS    Denial Date: 5/26/2022    Denial Rational:         Appeal Information:

## 2022-05-29 ENCOUNTER — HEALTH MAINTENANCE LETTER (OUTPATIENT)
Age: 27
End: 2022-05-29

## 2022-06-01 ENCOUNTER — MYC MEDICAL ADVICE (OUTPATIENT)
Dept: FAMILY MEDICINE | Facility: CLINIC | Age: 27
End: 2022-06-01
Payer: COMMERCIAL

## 2022-06-01 DIAGNOSIS — L74.510 AXILLARY HYPERHIDROSIS: Primary | ICD-10-CM

## 2022-06-03 ENCOUNTER — OFFICE VISIT (OUTPATIENT)
Dept: PSYCHOLOGY | Facility: CLINIC | Age: 27
End: 2022-06-03
Payer: COMMERCIAL

## 2022-06-03 DIAGNOSIS — F33.1 MODERATE EPISODE OF RECURRENT MAJOR DEPRESSIVE DISORDER (H): Primary | ICD-10-CM

## 2022-06-03 PROCEDURE — 90834 PSYTX W PT 45 MINUTES: CPT | Mod: HN | Performed by: PSYCHOLOGIST

## 2022-06-03 NOTE — PROGRESS NOTES
Behavioral Health Progress Note    Client's Legal Name: Tato Adler    Client's Preferred Name: Tato  YOB: 1995  Type of Service: in-person, counseling  Length of Service:   Start time: 9:10AM   End time: 9:50AM   Duration: 40 minutes  Attendees: patient    Identifying Information and Presenting Problem:  Tato is a 27 year old adult being seen for problematic symptoms of depression, and tauma in the context of recent traumatic event. Additionally, he has identified an increased pattern of alcohol use in the past 2 years and expresses concern about this pattern.     Treatment Objective(s) Addressed in This Session:  Alcohol / Substance Use: will make healthier choices in regard to substance use    Progress on / Status of Treatment Objective(s) / Homework:  Satisfactory progress     Mental Health Screening Questionnaires:  PHQ-9:   PHQ 4/20/2022 5/13/2022 5/18/2022   PHQ-9 Total Score 13 12 11   Q9: Thoughts of better off dead/self-harm past 2 weeks Several days Several days Several days      ROWDY-7:   ROWDY-7 SCORE 4/20/2022 5/18/2022   Total Score 7 3       Topics Discussed/Interventions Provided:    Medication: Tato reported that at last visit with PCP, they decided to keep sertraline at 100 mg with the possibility of increasing to 150 mg in future. He notes some frustration in that he is not getting the relief from the medication that he would like, such as when he had previously taken during undergrad.    Alcohol use:  Tato expressed a desire to cut back on alcohol consumption (and vaping) but that these changes have been historically difficult to make.      Motivational interviewing around alcohol use. Processed triggers/reasons for drinking, including enhanced mood after 2-3 drinks, feelings that it aids sleep, as well as its role in social gatherings. Explored ambivalence around complete abstinence.     Assessment:   The patient appeared to be active and engaged in  "today's session and was receptive to feedback.     Mental Status:   During interaction with the examiner today, Godwin was cooperative, open, engaged and pleasant. Patient was generally alert and oriented to person, place, time, and situation. They were casually dressed, appropriately groomed and appeared stated age. Patient's attire was appropriate for the weather and occasion. Eye contact was good. Psychomotor functioning: normal or unremarkable. Speech was normal limits tone, rate, volume; largely coherent and relevant to topic. Mood was \"good\"; affect was mood-congruent. Thought processes were unremarkable. Thought content was not remarkable with no evidence of psychotic features and no evidence of suicide, homicide, or nonsuicidal self injury related thoughts, intent, urges, planning, behavior/recent attempts. Memory appeared grossly intact without being formally evaluated. Insight: good. Judgment was good. Patient exhibited good impulse control during the appointment.     Does the patient appear to be at imminent risk of harm to self/others at this time? No    The session was necessary to address symptoms of trauma and low mood that have been interfering with patient's ability to function in areas related to interacting and relating with others, maintaining personal health and physical well-being and participating in meaningful activities. Ongoing psychotherapy is necessary to provide counseling.    DSM-5 Diagnosis:  Major Depressive Disorder, Recurrent, Moderate   Other Stressor or Trauma-Related Disorder   R/o Alcohol Use Disorder     Plan:  1. Follow up with this provider on 6/10   2. Continue to explore underlying motivations for drinking and possible supports   3. After Visit Summary will be reviewed in Bonny BLANCO, PhD    NOTE: Treatment plan due next session.  Diagnostic assessment update due 5/05/2023.        "

## 2022-06-03 NOTE — PROGRESS NOTES
Preceptor Attestation:  I have reviewed and agree with the behavioral health fellow's documentation for this visit.  I did not see the patient.  Supervising Clinical Psychologist:  Jyana Goodwin PSYD LP

## 2022-06-10 ENCOUNTER — OFFICE VISIT (OUTPATIENT)
Dept: PSYCHOLOGY | Facility: CLINIC | Age: 27
End: 2022-06-10
Payer: COMMERCIAL

## 2022-06-10 DIAGNOSIS — F33.1 MODERATE EPISODE OF RECURRENT MAJOR DEPRESSIVE DISORDER (H): Primary | ICD-10-CM

## 2022-06-10 DIAGNOSIS — F43.10 PTSD (POST-TRAUMATIC STRESS DISORDER): ICD-10-CM

## 2022-06-10 PROCEDURE — 90834 PSYTX W PT 45 MINUTES: CPT | Mod: HN | Performed by: PSYCHOLOGIST

## 2022-06-10 ASSESSMENT — ANXIETY QUESTIONNAIRES
5. BEING SO RESTLESS THAT IT IS HARD TO SIT STILL: NOT AT ALL
IF YOU CHECKED OFF ANY PROBLEMS ON THIS QUESTIONNAIRE, HOW DIFFICULT HAVE THESE PROBLEMS MADE IT FOR YOU TO DO YOUR WORK, TAKE CARE OF THINGS AT HOME, OR GET ALONG WITH OTHER PEOPLE: NOT DIFFICULT AT ALL
3. WORRYING TOO MUCH ABOUT DIFFERENT THINGS: SEVERAL DAYS
GAD7 TOTAL SCORE: 2
2. NOT BEING ABLE TO STOP OR CONTROL WORRYING: NOT AT ALL
6. BECOMING EASILY ANNOYED OR IRRITABLE: NOT AT ALL
1. FEELING NERVOUS, ANXIOUS, OR ON EDGE: SEVERAL DAYS
7. FEELING AFRAID AS IF SOMETHING AWFUL MIGHT HAPPEN: NOT AT ALL
GAD7 TOTAL SCORE: 2

## 2022-06-10 ASSESSMENT — PATIENT HEALTH QUESTIONNAIRE - PHQ9
SUM OF ALL RESPONSES TO PHQ QUESTIONS 1-9: 14
5. POOR APPETITE OR OVEREATING: NOT AT ALL

## 2022-06-10 NOTE — PROGRESS NOTES
Behavioral Health Progress Note    Client's Legal Name: Tato Adler    Client's Preferred Name: Tato  YOB: 1995  Type of Service: in-person, counseling  Length of Service:   Start time: 9:10AM   End time: 9:50AM   Duration: 40 minutes  Attendees: patient    Identifying Information and Presenting Problem:  Tato is a 27 year old adult being seen for problematic symptoms of depression, and tauma in the context of recent traumatic event. Additionally, he has identified an increased pattern of alcohol use in the past 2 years and expresses concern about this pattern.     Treatment Objective(s) Addressed in This Session:  Depressed Mood: Decrease frequency and intensity of feeling down, depressed, hopeless    Progress on / Status of Treatment Objective(s) / Homework:  Minimal progress       Mental Health Screening Questionnaires:  PHQ-9:   PHQ 5/13/2022 5/18/2022 6/10/2022   PHQ-9 Total Score 12 11 14   Q9: Thoughts of better off dead/self-harm past 2 weeks Several days Several days More than half the days      ROWDY-7:   ROWDY-7 SCORE 4/20/2022 5/18/2022 6/10/2022   Total Score 7 3 2       Topics Discussed/Interventions Provided:    Mood: Tato reports feeling really down this past week. He notes passive suicidal ideation but denies any intent, urges, or plan to act. He expresses feeling lonely but says this doesn't make sense because he has supportive friends. Tato has been able to remind himself that he has felt this down in the past (during undergrad) and that things got better. He shared a number of things of things that he is looking forward to in the next week. He denied having shared all low his mood as been with his friends.     Medication: Tato is open to discussing increasing sertraline from 100 to 150mg with PCP. He expresses some fears of trying this and learning that it will not work.     Processed low mood and identified protective factors. Relational processing  regarding connection with friends and what gets in the way of sharing how he is doing with them, including internalized feelings of depression as weakness, fears of overburdening others, and self-criticism/judgment of others. Discussed pros and cons of increasing sertraline verses changing medications.     Assessment:   The patient appeared to be active and engaged in today's session and was receptive to feedback.     Mental Status:   During interaction with the examiner today, Godwin was cooperative, open, engaged and pleasant and teaful. Patient was generally alert and oriented to person, place, time, and situation. They were casually dressed, appropriately groomed and appeared stated age. Patient's attire was appropriate for the weather and occasion. Eye contact was good. Psychomotor functioning: normal or unremarkable. Speech was normal limits tone, rate, volume; largely coherent and relevant to topic. Mood was depressed; affect was mood-congruent. Thought processes were unremarkable. Thought content was not remarkable with no evidence of psychotic features and no evidence of suicide, homicide, or nonsuicidal self injury related thoughts, intent, urges, planning, behavior/recent attempts. Memory appeared grossly intact without being formally evaluated. Insight: good. Judgment was good. Patient exhibited good impulse control during the appointment.     Does the patient appear to be at imminent risk of harm to self/others at this time? No    The session was necessary to address symptoms of trauma and low mood that have been interfering with patient's ability to function in areas related to interacting and relating with others, maintaining personal health and physical well-being and participating in meaningful activities. Ongoing psychotherapy is necessary to provide counseling.    DSM-5 Diagnosis:  Major Depressive Disorder, Recurrent, Moderate   Other Stressor or Trauma-Related Disorder   R/o Alcohol Use Disorder      Plan:  1. Follow up with this provider on 6/15  2. Patient will consider sending MyChart to Dr. Manzo regarding medication   3. [facility]    REECE BLANCO, PhD    NOTE: Treatment plan due next session.  Diagnostic assessment update due 5/05/2023.

## 2022-06-13 DIAGNOSIS — F32.A DEPRESSION, UNSPECIFIED DEPRESSION TYPE: Primary | ICD-10-CM

## 2022-06-13 RX ORDER — SERTRALINE HYDROCHLORIDE 150 MG/1
1 CAPSULE ORAL DAILY
Qty: 30 CAPSULE | Refills: 3 | Status: SHIPPED | OUTPATIENT
Start: 2022-06-13 | End: 2022-11-08

## 2022-06-14 NOTE — PROGRESS NOTES
Behavioral Health Progress Note    Client's Legal Name: Tato Adler    Client's Preferred Name: Tato  YOB: 1995  Type of Service: in-person, counseling  Length of Service:   Start time: 8:25AM   End time: 9:10AM   Duration: 45 minutes  Attendees: patient    Identifying Information and Presenting Problem:  Tato is a 27 year old adult being seen for problematic symptoms of depression, and tauma in the context of recent traumatic event. Additionally, he has identified an increased pattern of alcohol use in the past 2 years and expresses concern about this pattern.     Treatment Objective(s) Addressed in This Session:  Depressed Mood: Decrease frequency and intensity of feeling down, depressed, hopeless    Progress on / Status of Treatment Objective(s) / Homework:  Minimal progress       Mental Health Screening Questionnaires:  PHQ-9:   PHQ 5/13/2022 5/18/2022 6/10/2022   PHQ-9 Total Score 12 11 14   Q9: Thoughts of better off dead/self-harm past 2 weeks Several days Several days More than half the days      ROWDY-7:   ROWDY-7 SCORE 4/20/2022 5/18/2022 6/10/2022   Total Score 7 3 2       Topics Discussed/Interventions Provided:    Mood: Tato reports that things have been a bit better this past week; however, he called a suicide hotline Monday night when he was feeling really terrible. He denied any intent or urges to act on passive suicidal ideation. 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 This thought really scared him, which is why he called the hotline, and he found talking to someone very useful. Tato reports that he wants to live and is thinking about the future. He expressed gratitude for being connected to therapy and seeking help.     Treatment options: Discussed additional treatment programs through [facility], including [facility] and [facility] hospital programs.      Resources for alcohol use: Discussed AA and SMART recovery groups, which Tato is most interested in trying.     Relational processing regarding what last session was like for patient. Processed feelings associated with seeking additional help, including not deserving the help or that other people need it more, as well as embarrassment. Identified  protective factors related to passive SI.  Looked up AA and SMART recovery groups together.     Assessment:   The patient appeared to be active and engaged in today's session and was receptive to feedback.     Mental Status:   During interaction with the examiner today, Tato was cooperative, open, engaged and pleasant and teaful. Patient was generally alert and oriented to person, place, time, and situation. They were casually dressed, appropriately groomed and appeared stated age. Patient's attire was appropriate for the weather and occasion. Eye contact was good. Psychomotor functioning: normal or unremarkable. Speech was normal limits tone, rate, volume; largely coherent and relevant to topic. Mood was depressed; affect was mood-congruent. Thought processes were unremarkable. Thought content was not remarkable with no evidence of psychotic features and no evidence of suicide, homicide, or nonsuicidal self injury related thoughts, intent, urges, planning, behavior/recent attempts. Memory appeared grossly intact without being formally evaluated. Insight: good. Judgment was good. Patient exhibited good impulse control during the appointment.     Does the patient appear to be at imminent risk of harm to self/others at this time? No    The session was necessary to address symptoms of trauma and low mood that have been interfering with patient's ability to function in areas related to interacting and relating with others, maintaining personal health and physical well-being and participating in meaningful activities. Ongoing psychotherapy is necessary to provide  counseling.    DSM-5 Diagnosis:  Major Depressive Disorder, Recurrent, Moderate   Other Stressor or Trauma-Related Disorder   R/o Alcohol Use Disorder     Plan:  1. Follow up with this provider on 6/22 and 7/01  2. Utilize resources in [facility].   3. After Visit Summary will be reviewed in Bonny BLANCO, PhD    NOTE: Treatment plan due next session.  Diagnostic assessment update due 5/05/2023.

## 2022-06-15 ENCOUNTER — OFFICE VISIT (OUTPATIENT)
Dept: PSYCHOLOGY | Facility: CLINIC | Age: 27
End: 2022-06-15
Payer: COMMERCIAL

## 2022-06-15 DIAGNOSIS — F43.10 PTSD (POST-TRAUMATIC STRESS DISORDER): ICD-10-CM

## 2022-06-15 DIAGNOSIS — F33.1 MODERATE EPISODE OF RECURRENT MAJOR DEPRESSIVE DISORDER (H): Primary | ICD-10-CM

## 2022-06-15 PROCEDURE — 90834 PSYTX W PT 45 MINUTES: CPT | Mod: HN | Performed by: PSYCHOLOGIST

## 2022-06-15 NOTE — PATIENT INSTRUCTIONS
Eliel Godwin, it was really nice to see you today. I am including some information that we discussed and looking forward to next Wednesday!     [email or web address]    [email or web address]    (This is the number you can call to schedule an intake for a Mental Health Day program)  [facility] Mental [facility] and Addiction Central Scheduling  [phone].     Crisis Numbers     [place] - [phone] ([facility] Mobile Response Team)    [facility] [phone] (Emergency Psychiatric Evaluation)     [facility] Multilingual Crisis Line:  [phone]    [place] [facility]  24 hour crisis walk-in and crisis line  [address] [place], [place]  [phone]    Crisis Text Line: Text [place] to 370880. Free support at your fingertips 24/7  People who text [place] to [phone] will be connected with a counselor. Crisis Text Line is available 24 hours a day, seven days a week.    Or call 304     epression Self Care Plan / Survival Kit    Self-Care for Depression  Here s the deal. Your body and mind are really not as separate as most people think.  What you do and think affects how you feel and how you feel influences what you do and think. This means if you do things that people who feel good do, it will help you feel better.  Sometimes this is all it takes.  There is also a place for medication and therapy depending on how severe your depression is, so be sure to consult with your medical provider and/ or Behavioral Health Consultant if your symptoms are worsening or not improving.     In order to better manage my stress, I will:    Exercise  Get some form of exercise, every day. This will help reduce pain and release endorphins, the  feel good  chemicals in your brain. This is almost as good as taking antidepressants!  This is not the same as joining a gym and then never going! (they count on that by the way ) It can be as simple as just going for a walk or doing some  gardening, anything that will get you moving.      Hygiene   Maintain good hygiene (Get out of bed in the morning, Make your bed, Brush your teeth, Take a shower, and Get dressed like you were going to work, even if you are unemployed).  If your clothes don't fit try to get ones that do.    Diet  I will strive to eat foods that are good for me, drink plenty of water, and avoid excessive sugar, caffeine, alcohol, and other mood-altering substances.  Some foods that are helpful in depression are: complex carbohydrates, B vitamins, flaxseed, fish or fish oil, fresh fruits and vegetables.    Psychotherapy  I agree to participate in Individual Therapy (if recommended).    Medication  If prescribed medications, I agree to take them.  Missing doses can result in serious side effects.  I understand that drinking alcohol, or other illicit drug use, may cause potential side effects.  I will not stop my medication abruptly without first discussing it with my provider.    Staying Connected With Others  I will stay in touch with my friends, family members, and my primary care provider/team.    Use your imagination  Be creative.  We all have a creative side; it doesn t matter if it s oil painting, sand castles, or mud pies! This will also kick up the endorphins.    Witness Beauty  (AKA stop and smell the roses) Take a look outside, even in mid-winter. Notice colors, textures. Watch the squirrels and birds.     Service to others  Be of service to others.  There is always someone else in need.  By helping others we can  get out of ourselves  and remember the really important things.  This also provides opportunities for practicing all the other parts of the program.    Humor  Laugh and be silly!  Adjust your TV habits for less news and crime-drama and more comedy.    Control your stress  Try breathing deep, massage therapy, biofeedback, and meditation. Find time to relax each day.     My support system    Clinic Contact:  Phone      Contact 1:  Phone     Contact 2:  Phone     [facility]/:  Phone     Therapist:  Phone     Local crisis center:    Phone     Other community support:  Phone

## 2022-06-22 ENCOUNTER — OFFICE VISIT (OUTPATIENT)
Dept: PSYCHOLOGY | Facility: CLINIC | Age: 27
End: 2022-06-22
Payer: COMMERCIAL

## 2022-06-22 DIAGNOSIS — F43.10 PTSD (POST-TRAUMATIC STRESS DISORDER): ICD-10-CM

## 2022-06-22 DIAGNOSIS — F33.1 MODERATE EPISODE OF RECURRENT MAJOR DEPRESSIVE DISORDER (H): Primary | ICD-10-CM

## 2022-06-22 PROCEDURE — 90834 PSYTX W PT 45 MINUTES: CPT | Mod: HN | Performed by: PSYCHOLOGIST
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
## 2022-07-01 ENCOUNTER — OFFICE VISIT (OUTPATIENT)
Dept: PSYCHOLOGY | Facility: CLINIC | Age: 27
End: 2022-07-01
Payer: COMMERCIAL

## 2022-07-01 DIAGNOSIS — F33.1 MODERATE EPISODE OF RECURRENT MAJOR DEPRESSIVE DISORDER (H): Primary | ICD-10-CM

## 2022-07-01 PROCEDURE — 90834 PSYTX W PT 45 MINUTES: CPT | Mod: HN | Performed by: PSYCHOLOGIST

## 2022-07-08 ENCOUNTER — OFFICE VISIT (OUTPATIENT)
Dept: PSYCHOLOGY | Facility: CLINIC | Age: 27
End: 2022-07-08
Payer: COMMERCIAL

## 2022-07-08 DIAGNOSIS — F33.1 MODERATE EPISODE OF RECURRENT MAJOR DEPRESSIVE DISORDER (H): Primary | ICD-10-CM

## 2022-07-08 PROCEDURE — 90834 PSYTX W PT 45 MINUTES: CPT | Mod: HN | Performed by: PSYCHOLOGIST
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
## 2022-07-22 ENCOUNTER — OFFICE VISIT (OUTPATIENT)
Dept: PSYCHOLOGY | Facility: CLINIC | Age: 27
End: 2022-07-22
Payer: COMMERCIAL

## 2022-07-22 DIAGNOSIS — F33.1 MODERATE EPISODE OF RECURRENT MAJOR DEPRESSIVE DISORDER (H): Primary | ICD-10-CM

## 2022-07-22 DIAGNOSIS — F43.10 PTSD (POST-TRAUMATIC STRESS DISORDER): ICD-10-CM

## 2022-07-22 PROCEDURE — 90834 PSYTX W PT 45 MINUTES: CPT | Mod: HN | Performed by: PSYCHOLOGIST
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
## 2022-08-31 ENCOUNTER — VIRTUAL VISIT (OUTPATIENT)
Dept: PSYCHOLOGY | Facility: CLINIC | Age: 27
End: 2022-08-31
Payer: COMMERCIAL

## 2022-08-31 DIAGNOSIS — F33.1 MODERATE EPISODE OF RECURRENT MAJOR DEPRESSIVE DISORDER (H): Primary | ICD-10-CM

## 2022-08-31 PROCEDURE — 90834 PSYTX W PT 45 MINUTES: CPT | Mod: GT | Performed by: PSYCHOLOGIST
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
## 2022-09-13 ENCOUNTER — OFFICE VISIT (OUTPATIENT)
Dept: PSYCHOLOGY | Facility: CLINIC | Age: 27
End: 2022-09-13
Payer: COMMERCIAL

## 2022-09-13 DIAGNOSIS — F33.1 MODERATE EPISODE OF RECURRENT MAJOR DEPRESSIVE DISORDER (H): Primary | ICD-10-CM

## 2022-09-13 PROCEDURE — 90834 PSYTX W PT 45 MINUTES: CPT | Mod: HN | Performed by: PSYCHOLOGIST

## 2022-09-13 ASSESSMENT — ANXIETY QUESTIONNAIRES
GAD7 TOTAL SCORE: 4
3. WORRYING TOO MUCH ABOUT DIFFERENT THINGS: SEVERAL DAYS
IF YOU CHECKED OFF ANY PROBLEMS ON THIS QUESTIONNAIRE, HOW DIFFICULT HAVE THESE PROBLEMS MADE IT FOR YOU TO DO YOUR WORK, TAKE CARE OF THINGS AT HOME, OR GET ALONG WITH OTHER PEOPLE: SOMEWHAT DIFFICULT
2. NOT BEING ABLE TO STOP OR CONTROL WORRYING: SEVERAL DAYS
1. FEELING NERVOUS, ANXIOUS, OR ON EDGE: SEVERAL DAYS
GAD7 TOTAL SCORE: 4
7. FEELING AFRAID AS IF SOMETHING AWFUL MIGHT HAPPEN: NOT AT ALL
5. BEING SO RESTLESS THAT IT IS HARD TO SIT STILL: NOT AT ALL
6. BECOMING EASILY ANNOYED OR IRRITABLE: SEVERAL DAYS

## 2022-09-13 ASSESSMENT — PATIENT HEALTH QUESTIONNAIRE - PHQ9
5. POOR APPETITE OR OVEREATING: NOT AT ALL
SUM OF ALL RESPONSES TO PHQ QUESTIONS 1-9: 14

## 2022-09-13 NOTE — PATIENT INSTRUCTIONS
1)  Visit [facility] for tour     2) Re-start gratitude: 2 things every night before bed     3) Make appointment with Dr. Manzo     4) Make additional appointments with me
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
## 2022-09-19 NOTE — PROGRESS NOTES
Behavioral Health Progress Note    Client's Legal Name: Tato Adler    Client's Preferred Name: Tato  YOB: 1995  Type of Service: in-person, counseling  Length of Service:   Start time: 8:25AM   End time: 9:15AM   Duration: 50 minutes  Attendees: patient    Identifying Information and Presenting Problem:  Tato is a 27 year old adult being seen for problematic symptoms of depression, and tauma in the context of recent traumatic event. Additionally, he has identified an increased pattern of alcohol use in the past 2 years and expresses concern about this pattern.     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     Friendships: processed friendships and importance of boundary-setting. Explored what gets in the way, including fears of hurting others' feelings and losing social connections. Encouraged noticing physiological and emotional responses before moving to analysis mode.     Mood: reported that past week has been difficult, somewhat in part to challenges in friendships. He noted not going to sleep without drinking every night. Described laying in bed until 6pm on Saturday. Explored potentially starting an IOP and provided psychoeducation on impact of alcohol consumption on mood and behavioral activation, even just stepping outside each day.      Assessment:   The patient appeared to be active and  engaged in today's session and was receptive to feedback.     Mental Status:   During interaction with the examiner today, Godwin was cooperative, open, engaged and pleasant and tearful. Patient was generally alert and oriented to person, place, time, and situation. They were casually dressed, appropriately groomed and appeared stated age. Patient's attire was appropriate for the weather and occasion. Eye contact was good. Psychomotor functioning: normal or unremarkable. Speech was normal limits tone, rate, volume; largely coherent and relevant to topic. Mood was depressed; affect was mood-congruent. Thought processes were unremarkable. Thought content was not remarkable with no evidence of psychotic features and no evidence of suicide, homicide, or nonsuicidal self injury related thoughts, intent, urges, planning, behavior/recent attempts. Memory appeared grossly intact without being formally evaluated. Insight: good. Judgment was good. Patient exhibited good impulse control during the appointment.     Does the patient appear to be at imminent risk of harm to self/others at this time? No    The session was necessary to address symptoms of trauma and low mood that have been interfering with patient's ability to function in areas related to interacting and relating with others, maintaining personal health and physical well-being and participating in meaningful activities. Ongoing psychotherapy is necessary to provide counseling.    DSM-5 Diagnosis:  Major Depressive Disorder, Recurrent, Moderate   Other Stressor or Trauma-Related Disorder   R/o Alcohol Use Disorder     Plan:  1. Follow up with this provider on 10/4  2. Follow up with Dr. Manzo on 10/05 to discuss possible medication changes  3.  Work on goals in AVS   4. Encouraged patient to reach out to provider sooner for an earlier appt or to check in  5. After Visit Summary was printed     REECE BLANCO, PhD    NOTE: Treatment plan due next session.   Diagnostic assessment update due 5/05/2023.

## 2022-09-20 ENCOUNTER — OFFICE VISIT (OUTPATIENT)
Dept: PSYCHOLOGY | Facility: CLINIC | Age: 27
End: 2022-09-20
Payer: COMMERCIAL

## 2022-09-20 DIAGNOSIS — F43.10 PTSD (POST-TRAUMATIC STRESS DISORDER): ICD-10-CM

## 2022-09-20 DIAGNOSIS — F33.1 MODERATE EPISODE OF RECURRENT MAJOR DEPRESSIVE DISORDER (H): Primary | ICD-10-CM

## 2022-09-20 PROCEDURE — 90834 PSYTX W PT 45 MINUTES: CPT | Mod: HN | Performed by: PSYCHOLOGIST

## 2022-09-20 NOTE — PATIENT INSTRUCTIONS
Here is the number the number to discuss intensive outpatient program    [facility] Central Scheduling  [phone].     [facility]   -attend one group     Keep up with journaling

## 2022-10-03 ENCOUNTER — HEALTH MAINTENANCE LETTER (OUTPATIENT)
Age: 27
End: 2022-10-03

## 2022-10-03 NOTE — PROGRESS NOTES
Behavioral Health Progress Note    Client's Legal Name: Tato Adler    Client's Preferred Name: Tato  YOB: 1995  Type of Service: in-person, counseling  Length of Service:   Start time: 8:20AM   End time: 9:10 AM   Duration: 50 minutes  Attendees: patient    Identifying Information and Presenting Problem:  Tato is a 27 year old adult being seen for problematic symptoms of depression, and tauma in the context of recent traumatic event. Additionally, he has identified an increased pattern of alcohol use in the past 2 years and expresses concern about this pattern.     Treatment Objective(s) Addressed in This Session:  Depressed Mood: Decrease frequency and intensity of feeling down, depressed, hopeless    Progress on / Status of Treatment Objective(s) / Homework:  Satisfactory Progress      Mental Health Screening Questionnaires:  PHQ-9:   PHQ 5/18/2022 6/10/2022 9/13/2022   PHQ-9 Total Score 11 14 14   Q9: Thoughts of better off dead/self-harm past 2 weeks Several days More than half the days Several days      ROWDY-7:   ROWDY-7 SCORE 5/18/2022 6/10/2022 9/13/2022   Total Score 3 2 4       Topics Discussed/Interventions Provided:    Medication: Tato notes he has not been taking his medication for a the past couple of weeks. Prior to this, he estimates he had been taking it 20% - 30%. He as scheduled follow up with PCP to discuss alternative medications.     Alcohol treatment: shared that he had called [facility] for an assessment, following last session. Evaluator shared option for IOP with 3 3 hour evening meetings per week or a one 4 hour session. Tato expressed some ambivalence about 3 times per week. Also discussed option to go to [facility] for treatment and looked at website. Processed sharing that he may need some time off with his supervisor. He anticipates she will be supportive. Encouraged him to have this conversation, even if he hasn't figured out all the details  of treatment or what he wants to do.       Assessment:   The patient appeared to be active and engaged in today's session and was receptive to feedback.     Mental Status:   During interaction with the examiner today, Godwin was cooperative, open, engaged and pleasant and tearful. Patient was generally alert and oriented to person, place, time, and situation. They were casually dressed, appropriately groomed and appeared stated age. Patient's attire was appropriate for the weather and occasion. Eye contact was good. Psychomotor functioning: normal or unremarkable. Speech was normal limits tone, rate, volume; largely coherent and relevant to topic. Mood was depressed; affect was mood-congruent. Thought processes were unremarkable. Thought content was not remarkable with no evidence of psychotic features and no evidence of suicide, homicide, or nonsuicidal self injury related thoughts, intent, urges, planning, behavior/recent attempts. Memory appeared grossly intact without being formally evaluated. Insight: good. Judgment was good. Patient exhibited good impulse control during the appointment.     Does the patient appear to be at imminent risk of harm to self/others at this time? No    The session was necessary to address symptoms of trauma and low mood that have been interfering with patient's ability to function in areas related to interacting and relating with others, maintaining personal health and physical well-being and participating in meaningful activities. Ongoing psychotherapy is necessary to provide counseling.    DSM-5 Diagnosis:  Major Depressive Disorder, Recurrent, Moderate   Other Stressor or Trauma-Related Disorder   R/o Alcohol Use Disorder     Plan:  1. Follow up with this provider on 10/13  2. Follow up with Dr. Manzo on 10/5  3. Encouraged patient to reach out to provider sooner for check in, if needed  4. After Visit Summary was printed     REECE BLANCO, PhD    NOTE: Treatment plan due next  session.  Diagnostic assessment update due 5/05/2023.

## 2022-10-04 ENCOUNTER — OFFICE VISIT (OUTPATIENT)
Dept: PSYCHOLOGY | Facility: CLINIC | Age: 27
End: 2022-10-04
Payer: COMMERCIAL

## 2022-10-04 DIAGNOSIS — F33.1 MODERATE EPISODE OF RECURRENT MAJOR DEPRESSIVE DISORDER (H): Primary | ICD-10-CM

## 2022-10-04 PROCEDURE — 90834 PSYTX W PT 45 MINUTES: CPT | Mod: HN | Performed by: PSYCHOLOGIST

## 2022-10-29 ENCOUNTER — OFFICE VISIT (OUTPATIENT)
Dept: URGENT CARE | Facility: URGENT CARE | Age: 27
End: 2022-10-29
Payer: COMMERCIAL

## 2022-10-29 VITALS
HEIGHT: 65 IN | DIASTOLIC BLOOD PRESSURE: 92 MMHG | HEART RATE: 84 BPM | OXYGEN SATURATION: 97 % | SYSTOLIC BLOOD PRESSURE: 136 MMHG | BODY MASS INDEX: 31.65 KG/M2 | TEMPERATURE: 97.9 F | WEIGHT: 190 LBS

## 2022-10-29 DIAGNOSIS — N89.8 VAGINAL DISCHARGE: Primary | ICD-10-CM

## 2022-10-29 DIAGNOSIS — Z11.3 SCREEN FOR STD (SEXUALLY TRANSMITTED DISEASE): ICD-10-CM

## 2022-10-29 PROCEDURE — 99214 OFFICE O/P EST MOD 30 MIN: CPT | Mod: 25 | Performed by: FAMILY MEDICINE

## 2022-10-29 PROCEDURE — 87591 N.GONORRHOEAE DNA AMP PROB: CPT | Performed by: FAMILY MEDICINE

## 2022-10-29 PROCEDURE — 96372 THER/PROPH/DIAG INJ SC/IM: CPT | Performed by: FAMILY MEDICINE

## 2022-10-29 PROCEDURE — 87491 CHLMYD TRACH DNA AMP PROBE: CPT | Performed by: FAMILY MEDICINE

## 2022-10-29 RX ORDER — METRONIDAZOLE 500 MG/1
TABLET ORAL
COMMUNITY
Start: 2022-10-26 | End: 2022-11-08

## 2022-10-29 RX ORDER — AZITHROMYCIN 500 MG/1
1000 TABLET, FILM COATED ORAL ONCE
Status: COMPLETED | OUTPATIENT
Start: 2022-10-29 | End: 2022-10-29

## 2022-10-29 RX ADMIN — AZITHROMYCIN 500 MG: 500 TABLET, FILM COATED ORAL at 15:29

## 2022-10-29 NOTE — PROGRESS NOTES
Assessment & Plan     Screen for STD (sexually transmitted disease)  - Chlamydia trachomatis PCR  - Neisseria gonorrhoeae PCR  - Neisseria gonorrhoeae PCR  - Chlamydia trachomatis PCR    Vaginal discharge  - INJECTION INTRAMUSCULAR OR SUB-Q  - cefTRIAXone (ROCEPHIN) injection 500 mg  - azithromycin (ZITHROMAX) tablet 1,000 mg     Tato requested GC/CT treatment today  - orders placed as above -- we discussed if chlamydia returns positive we should consider doing a full course of doxycycline, he can reach out to our clinic or his doctor's office to get a prescription if this returns positive but at this time as he is unconfirmed for an STD I opted to provide him with azithromycin. Rocephin was given to cover for gonorrhea.     Discontinue BV treatment at this time as recent test returned normal and there has been no changes in symptoms with current regimen.     Patient utilized the on my way service unfortunately staff did not catch this therefore he waited in the normal queue of patients, I apologized on behalf of [facility] that the service did not deliver as described. I encouraged Tato to still use the service in the future, I will send an email to administration to help prevent these mistakes in the future.     25 minutes spent on the date of the encounter doing chart review, history and exam, documentation and further activities per the note.       Asael Fulton MD   [facility]    Subjective     Tato is a 27 year old adult who presents to clinic today for the following health issues:  Chief Complaint   Patient presents with     Urgent Care     STD     Pt in clinic to have STD screening due to having discharge that is yellow and green in color.     HPI  Patient is transgender -- had unprotected intercourse with a casual partner ( who has no symptoms and has no reported positive STD tests) recently ( 6 days ago).   Unfortunately [facility] tests from this past week returned in conclusive  "for GC/CT. Wet prep was normal although was placed on medication for bacterial vaginosis  Godwin has had Gonorrhea/Chlamydia last diagnosed 4 years ago    Denies fever/cramps/bleeding    Seen at [facility] a couple days ago,     Patient Active Problem List    Diagnosis Date Noted     Major depressive disorder, recurrent, moderate (H) 05/13/2022     Priority: Medium     Other Stressor or Trauma-Related Disorder  05/13/2022     Priority: Medium     Gender dysphoria 04/20/2022     Priority: Medium       Current Outpatient Medications   Medication     aluminum chloride (DRYSOL) 20 % external solution     hydrOXYzine (ATARAX) 25 MG tablet     Multiple Vitamin (DAILY VITAMINS) TABS     sertraline (ZOLOFT) 100 MG tablet     Sertraline HCl 150 MG CAPS     testosterone cypionate (DEPOTESTOSTERONE) 200 MG/ML injection     metroNIDAZOLE (FLAGYL) 500 MG tablet     Current Facility-Administered Medications   Medication     azithromycin (ZITHROMAX) tablet 1,000 mg     cefTRIAXone (ROCEPHIN) injection 500 mg         Objective    BP (!) 136/92   Pulse 84   Temp 97.9  F (36.6  C) (Oral)   Ht 1.651 m (5' 5\")   Wt 86.2 kg (190 lb)   SpO2 97%   BMI 31.62 kg/m    Physical Exam     GEN: NAD  Gait: normal    No results found for any visits on 10/29/22.          The use of [email or web address] dictation services may have been used to construct the content in this note; any grammatical or spelling errors are non-intentional. Please contact the author of this note directly if you are in need of any clarification.   "

## 2022-10-29 NOTE — PATIENT INSTRUCTIONS
Your urine tests should return in the next 1-2 days      Stop the antibiotic at this time      Today you were given two antibiotics to cover for gonorrhea and chlamydia      IF you test positive for chlamydia we should consider starting you on doxycycline , please call the clinic to discuss if you haven't heard from the team within 24 hours of the result returning      If your symptoms worsen at any point or if no improvement in the next 2 days please call your Doctor's office

## 2022-10-31 LAB
C TRACH DNA SPEC QL NAA+PROBE: NEGATIVE
N GONORRHOEA DNA SPEC QL NAA+PROBE: POSITIVE

## 2022-11-08 ENCOUNTER — OFFICE VISIT (OUTPATIENT)
Dept: FAMILY MEDICINE | Facility: CLINIC | Age: 27
End: 2022-11-08
Payer: COMMERCIAL

## 2022-11-08 VITALS
SYSTOLIC BLOOD PRESSURE: 124 MMHG | HEART RATE: 75 BPM | BODY MASS INDEX: 33.78 KG/M2 | TEMPERATURE: 97.9 F | OXYGEN SATURATION: 96 % | WEIGHT: 203 LBS | DIASTOLIC BLOOD PRESSURE: 84 MMHG

## 2022-11-08 DIAGNOSIS — F64.9 GENDER DYSPHORIA: ICD-10-CM

## 2022-11-08 DIAGNOSIS — Z86.19 HISTORY OF GONORRHEA: ICD-10-CM

## 2022-11-08 DIAGNOSIS — F33.1 MAJOR DEPRESSIVE DISORDER, RECURRENT, MODERATE (H): Primary | ICD-10-CM

## 2022-11-08 PROCEDURE — 99214 OFFICE O/P EST MOD 30 MIN: CPT | Performed by: FAMILY MEDICINE

## 2022-11-08 RX ORDER — CITALOPRAM HYDROBROMIDE 20 MG/1
TABLET ORAL
Qty: 30 TABLET | Refills: 0 | Status: SHIPPED | OUTPATIENT
Start: 2022-11-08 | End: 2022-11-30

## 2022-11-08 RX ORDER — TESTOSTERONE CYPIONATE 200 MG/ML
50 INJECTION, SOLUTION INTRAMUSCULAR WEEKLY
Qty: 10 ML | Refills: 0 | Status: SHIPPED | OUTPATIENT
Start: 2022-11-08 | End: 2023-04-28

## 2022-11-08 ASSESSMENT — ANXIETY QUESTIONNAIRES
1. FEELING NERVOUS, ANXIOUS, OR ON EDGE: NOT AT ALL
GAD7 TOTAL SCORE: 2
6. BECOMING EASILY ANNOYED OR IRRITABLE: SEVERAL DAYS
3. WORRYING TOO MUCH ABOUT DIFFERENT THINGS: NOT AT ALL
GAD7 TOTAL SCORE: 2
IF YOU CHECKED OFF ANY PROBLEMS ON THIS QUESTIONNAIRE, HOW DIFFICULT HAVE THESE PROBLEMS MADE IT FOR YOU TO DO YOUR WORK, TAKE CARE OF THINGS AT HOME, OR GET ALONG WITH OTHER PEOPLE: NOT DIFFICULT AT ALL
5. BEING SO RESTLESS THAT IT IS HARD TO SIT STILL: NOT AT ALL
7. FEELING AFRAID AS IF SOMETHING AWFUL MIGHT HAPPEN: NOT AT ALL
2. NOT BEING ABLE TO STOP OR CONTROL WORRYING: NOT AT ALL

## 2022-11-08 ASSESSMENT — PATIENT HEALTH QUESTIONNAIRE - PHQ9
5. POOR APPETITE OR OVEREATING: SEVERAL DAYS
SUM OF ALL RESPONSES TO PHQ QUESTIONS 1-9: 9

## 2022-11-08 NOTE — PATIENT INSTRUCTIONS
Citalapram (Celexa)  May be used in the treatment of moderate-to-severe depression (Major Depressive Disorder).  Less likely to cause drowsiness than some other antidepressants.  Has also been used off-label for other conditions such as anxiety, alcoholism, eating disorders, fibromyalgia, and obsessive-compulsive disorder.  SSRIs in general, are better tolerated than many other medicines used in the treatment of depression.  Celexa is less likely than some other SSRIs to interact with other medications.  SSRIs in general, are better tolerated than many other medicines used in the treatment of depression.    In rare casees selective serotonin reuptake inhibitors can cause an increase in suidical thinking. Please contact us or seek care urgently if this should occur.     Do you feel like you might be in crisis and potentially need professional services?   National Crisis Lines:   1-827-RAMNDSI (1-955.478.9024) - www.Selvz   6-038-464-TALK (6873) - www.suicidepreventionlifeline.org   [place]:   Crisis Text Line: Text [place] to 859672. Free support at your fingertips 24/7   People who text [place] to 049381 will be connected with a counselor. Crisis Text Line is available 24 hours a day, seven days a week.   [facility] Multilingual Crisis Line:  [phone]   [facility]:   COPE - Adult (psychiatric crisis, but cannot transport self): [phone]   COPE - Child: [phone]   Acute Psychiatric Services - [facility] (24 hour crisis walk-in and crisis line): [phone]   [address] [place], [place]   [facility] (Emergency Psychiatric Evaluation): [phone]   [facility]:   [facility] Adult Crisis Line (crisis counseling and walk-in urgent care mental health): [phone]   Adult Residential Crisis Centers:   [facility] operates two Adult Residential Crisis Centers in the area: [facility] ([place]) and [facility] ([place])   These facilities are a step below  in-patient hospital care, providing a three to ten-day stay for individuals who are at a moderate but not a high risk for self-harm. The crisis centers and other [employer] programs can be reached through their central screening at [phone].   Domestic Violence:   [place] Domestic Violence Crisis Line (24-hour [place] crisis line) [phone]     If in Immediate danger please go to the ER and/or call 9-1-1

## 2022-11-08 NOTE — PROGRESS NOTES
Assessment & Plan   Major depressive disorder, recurrent, moderate (H)  Discussed options for new medications following self discontinuation of Sertraline due to lack of effectiveness per patient. Opted for Citalopram given shorter duration of onset, effectiveness for both depression and anxiety (does not carry formal diagnosis of anxiety disorder, but endorses as symptom) as well as off label use for ETOH use disorder. Reviewed rare risk of increased suicidality. Reporting  in passive suicidality today compared to previous. Crisis resources included in AVS.  - citalopram (CELEXA) 20 MG tablet; Take 0.5 tablets (10 mg) by mouth every morning for 5 days, THEN 1 tablet (20 mg) every morning for 23 days.    Gender dysphoria  Not due for labs  - refill testosterone cypionate (DEPOTESTOSTERONE) 200 MG/ML injection; Inject 0.25 mLs (50 mg) into the muscle once a week    History of gonorrhea  - treated appropriately     Follow-up with PCP or myron MD in 4 weeks. Can check in via My Chart in interim prn.   Continue working with . Next appt 22.    30 minutes spent on the date of the encounter doing chart review, review of test results, patient visit and documentation [id number]     Lashell Hawk MD  [facility]  -----------------------------------------------------------------------------  Subjective   Tato is a 27 year old presenting for the following health issues:  Recheck Medication (Follow up on medications )    HPI   Pt not previously known to me. Treated for presumed STI with Rocephin and Azithro on 10/29. Was not given Doxy. Cultures returned positive for gonorrhea only.    Presents today for MDD med follow-up     Depression Follow up    How are you doing with your depression since your last visit? Overall improved. Stopped taking Zoloft about 3 weeks ago as felt worse when taking. Described as sadder, more muted. Also notes that he was drinking more when taking Zoloft and knows  that this was not helping his mood. Says he feels better of the Zoloft and is also now only drinking on weekends. Has only tried Zoloft for MDD, which worked well for him in past but has not been effective this time    Are you having other symptoms that might be associated with depression? Yes:  see PHQ 9 results    Have you had a significant life event?  No     Are you feeling anxious or having panic attacks?  Mild anxiety in the form of trouble relaxing and increased irritability. Describes intrusive thoughts sometimes as well. No panic symptoms    Do you have any concerns with your use of alcohol or other drugs? Yes:  working on ETOH use with therapist. Does not want other treatment    Social History     Tobacco Use     Smoking status: Every Day     Types: Cigarettes, Vaping Device     Smokeless tobacco: Never   Vaping Use     Vaping Use: Every day     Substances: Nicotine     Devices: Disposable   Substance Use Topics     Alcohol use: Yes     Comment: 7 drink per week     Drug use: Never     PHQ 6/10/2022 9/13/2022 11/8/2022   PHQ-9 Total Score 14 14 9   Q9: Thoughts of better off dead/self-harm past 2 weeks More than half the days Several days Several days     ROWDY-7 SCORE 6/10/2022 9/13/2022 11/8/2022   Total Score 2 4 2     Last PHQ-9 11/8/2022   1.  Little interest or pleasure in doing things 1   2.  Feeling down, depressed, or hopeless 1   3.  Trouble falling or staying asleep, or sleeping too much 2   4.  Feeling tired or having little energy 2   5.  Poor appetite or overeating 0   6.  Feeling bad about yourself 1   7.  Trouble concentrating 1   8.  Moving slowly or restless 0   Q9: Thoughts of better off dead/self-harm past 2 weeks 1   PHQ-9 Total Score 9   Difficulty at work, home, or with people Somewhat difficult     ROWDY-7  11/8/2022   1. Feeling nervous, anxious, or on edge 0   2. Not being able to stop or control worrying 0   3. Worrying too much about different things 0   4. Trouble relaxing 1   5.  Being so restless that it is hard to sit still 0   6. Becoming easily annoyed or irritable 1   7. Feeling afraid, as if something awful might happen 0   ROWDY-7 Total Score 2   If you checked any problems, how difficult have they made it for you to do your work, take care of things at home, or get along with other people? Not difficult at all       Review of Systems         Objective    /84   Pulse 75   Temp 97.9  F (36.6  C) (Oral)   Wt 92.1 kg (203 lb)   SpO2 96%   BMI 33.78 kg/m    Physical Exam       Office Visit on 10/29/2022   Component Date Value Ref Range Status     Neisseria gonorrheae 10/29/2022 Positive (A)  Negative Final    Positive for N. gonorrheae rRNA by transcription mediated amplification. As is true for all non-culture methods, a positive specimen obtained from a patient after therapeutic treatment cannot be interpreted as indicating the presence of viable N. gonorrheae.     Chlamydia trachomatis 10/29/2022 Negative  Negative Final    A negative result by transcription mediated amplification does not preclude the presence of C. trachomatis infection because results are dependent on proper and adequate collection, absence of inhibitors and sufficient rRNA to be detected.

## 2022-11-10 ENCOUNTER — OFFICE VISIT (OUTPATIENT)
Dept: PSYCHOLOGY | Facility: CLINIC | Age: 27
End: 2022-11-10
Payer: COMMERCIAL

## 2022-11-10 DIAGNOSIS — F33.1 MODERATE EPISODE OF RECURRENT MAJOR DEPRESSIVE DISORDER (H): Primary | ICD-10-CM

## 2022-11-10 DIAGNOSIS — F43.10 PTSD (POST-TRAUMATIC STRESS DISORDER): ICD-10-CM

## 2022-11-10 PROCEDURE — 90834 PSYTX W PT 45 MINUTES: CPT | Mod: HN | Performed by: PSYCHOLOGIST

## 2022-11-10 NOTE — PROGRESS NOTES
Behavioral Health Progress Note    Client's Legal Name: Tato Adler    Client's Preferred Name: Tato  YOB: 1995  Type of Service: in-person, counseling  Length of Service:   Start time: 3:05PM   End time: 3:50PM   Duration: 45 minutes  Attendees: patient    Identifying Information and Presenting Problem:  Tato is a 27 year old adult being seen for problematic symptoms of depression, and tauma in the context of recent traumatic event. Additionally, he has identified an increased pattern of alcohol use in the past 2 years and expresses concern about this pattern.     Treatment Objective(s) Addressed in This Session:  Depressed Mood: Decrease frequency and intensity of feeling down, depressed, hopeless    Progress on / Status of Treatment Objective(s) / Homework:  Satisfactory Progress      Mental Health Screening Questionnaires:  PHQ-9:   PHQ 6/10/2022 9/13/2022 11/8/2022   PHQ-9 Total Score 14 14 9   Q9: Thoughts of better off dead/self-harm past 2 weeks More than half the days Several days Several days      ROWDY-7:   ROWDY-7 SCORE 6/10/2022 9/13/2022 11/8/2022   Total Score 2 4 2       Topics Discussed/Interventions Provided:        Alcohol use: reports drinking ~ 3 nights in past 2.5 weeks, which is a significant decrease. He thinks nutri-system and being sick likely big contributors. Explored contexts with greatest cravings, which is at night when he is alone. Processed underlying cause of cravings, including feeling bored, to feel positive effects/freer and to block out negative feelings. He shared ways he has tried to implement barriers to drinking. Praised Tato for great progress and encouraged to keep planning ahead to help problem solve those barriers.     Mood: reports noticing some positive effects of decreasing alcohol use and says he is generally doing pretty good. Shares that he tends to feel down about being single, while he watches many of his friends get engaged  "and . Empathized and began exploring connections.     Assessment:   The patient appeared to be active and engaged in today's session and was receptive to feedback.     Mental Status:   During interaction with the examiner today, Godwin was cooperative, open, engaged and pleasant. Patient was generally alert and oriented to person, place, time, and situation. They were casually dressed, appropriately groomed and appeared stated age. Patient's attire was appropriate for the weather and occasion. Eye contact was good. Psychomotor functioning: normal or unremarkable. Speech was normal limits tone, rate, volume; largely coherent and relevant to topic. Mood was \"pretty good\"; affect was full range and appropriate. Thought processes were unremarkable. Thought content was not remarkable with no evidence of psychotic features and no evidence of suicide, homicide, or nonsuicidal self injury related thoughts, intent, urges, planning, behavior/recent attempts. Memory appeared grossly intact without being formally evaluated. Insight: good. Judgment was good. Patient exhibited good impulse control during the appointment.     Does the patient appear to be at imminent risk of harm to self/others at this time? No    The session was necessary to address symptoms of trauma and low mood that have been interfering with patient's ability to function in areas related to interacting and relating with others, maintaining personal health and physical well-being and participating in meaningful activities. Ongoing psychotherapy is necessary to provide counseling.    DSM-5 Diagnosis:  Major Depressive Disorder, Recurrent, Moderate   Other Stressor or Trauma-Related Disorder   R/o Alcohol Use Disorder     Plan:  1. Follow up with this provider on 11/29   2. After Visit Summary will be viewed in Bonny BLANCO, PhD    NOTE: Treatment plan due next session.  Diagnostic assessment update due 5/05/2023.  "

## 2022-11-29 ENCOUNTER — VIRTUAL VISIT (OUTPATIENT)
Dept: PSYCHOLOGY | Facility: CLINIC | Age: 27
End: 2022-11-29
Payer: COMMERCIAL

## 2022-11-29 DIAGNOSIS — F33.1 MODERATE EPISODE OF RECURRENT MAJOR DEPRESSIVE DISORDER (H): Primary | ICD-10-CM

## 2022-11-29 PROCEDURE — 90832 PSYTX W PT 30 MINUTES: CPT | Mod: GT | Performed by: PSYCHOLOGIST
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
## 2022-11-30 DIAGNOSIS — F33.1 MAJOR DEPRESSIVE DISORDER, RECURRENT, MODERATE (H): ICD-10-CM

## 2022-11-30 RX ORDER — CITALOPRAM HYDROBROMIDE 20 MG/1
20 TABLET ORAL DAILY
Qty: 30 TABLET | Refills: 2 | Status: SHIPPED | OUTPATIENT
Start: 2022-11-30 | End: 2023-01-10

## 2022-11-30 NOTE — TELEPHONE ENCOUNTER
"Last seen 11/8/2022    Request for medication refill:  citalopram (CELEXA) 20 MG tablet  Last filled 11/8/2022, has end date of 12/6/2022    Providers if patient needs an appointment and you are willing to give a one month supply please refill for one month and  send a letter/MyChart using \".SMILLIMITEDREFILL\" .smillimited and route chart to \"P SMI \" (Giving one month refill in non controlled medications is strongly recommended before denial)    If refill has been denied, meaning absolutely no refills without visit, please complete the smart phrase \".smirxrefuse\" and route it to the \"P SMI MED REFILLS\"  pool to inform the patient and the pharmacy.    Eladia Ramirez RN          "
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
## 2022-12-06 ENCOUNTER — OFFICE VISIT (OUTPATIENT)
Dept: PSYCHOLOGY | Facility: CLINIC | Age: 27
End: 2022-12-06
Payer: COMMERCIAL

## 2022-12-06 DIAGNOSIS — F33.1 MODERATE EPISODE OF RECURRENT MAJOR DEPRESSIVE DISORDER (H): Primary | ICD-10-CM

## 2022-12-06 PROCEDURE — 90832 PSYTX W PT 30 MINUTES: CPT | Mod: GT | Performed by: PSYCHOLOGIST

## 2022-12-06 ASSESSMENT — ANXIETY QUESTIONNAIRES
6. BECOMING EASILY ANNOYED OR IRRITABLE: NOT AT ALL
3. WORRYING TOO MUCH ABOUT DIFFERENT THINGS: SEVERAL DAYS
5. BEING SO RESTLESS THAT IT IS HARD TO SIT STILL: NOT AT ALL
7. FEELING AFRAID AS IF SOMETHING AWFUL MIGHT HAPPEN: NOT AT ALL
1. FEELING NERVOUS, ANXIOUS, OR ON EDGE: SEVERAL DAYS
GAD7 TOTAL SCORE: 4
GAD7 TOTAL SCORE: 4
2. NOT BEING ABLE TO STOP OR CONTROL WORRYING: SEVERAL DAYS

## 2022-12-06 ASSESSMENT — PATIENT HEALTH QUESTIONNAIRE - PHQ9
5. POOR APPETITE OR OVEREATING: SEVERAL DAYS
SUM OF ALL RESPONSES TO PHQ QUESTIONS 1-9: 16

## 2022-12-06 NOTE — Clinical Note
AFter I signed this I noticed that the PHQ9 was flagged as patient having suicidal thoughts.  PHQ9 indicates Q9 at a 2, but in the mse section the parts about suicidal thoughts etc is all negative - please indicate if you assessed this further and the results of that assessment.  Thank you

## 2022-12-12 NOTE — PROGRESS NOTES
Behavioral Health Progress Note    Client's Legal Name: Tato Adler    Client's Preferred Name: Tato  YOB: 1995  Type of Service: in person, counseling   Length of Service:   Start time: 8:20AM   End time: 9:05AM   Duration: 45 minutes  Attendees: patient    Identifying Information and Presenting Problem:  Tato is a 27 year old adult being seen for problematic symptoms of depression. Additionally, he has identified an increased pattern of alcohol use in the past 2 years and expresses concern about this pattern.     Treatment Objective(s) Addressed in This Session:  Depressed Mood: Decrease frequency and intensity of feeling down, depressed, hopeless    Progress on / Status of Treatment Objective(s) / Homework:  Satisfactory Progress      Mental Health Screening Questionnaires:  PHQ-9:   PHQ 11/8/2022 12/6/2022 12/13/2022   PHQ-9 Total Score 9 16 15   Q9: Thoughts of better off dead/self-harm past 2 weeks Several days More than half the days Several days      ROWDY-7:   ROWDY-7 SCORE 11/8/2022 12/6/2022 12/13/2022   Total Score 2 4 2       Topics Discussed/Interventions Provided:    Session focused on assessing mood and safety. Tato shares continued feelings of loneliness. He says he feels future oriented but also shares that this is less present with time. Discussed Tato's tendency to shoulder burden of fixing things on his own. Encouraged him to lean more on provider and explored barriers to sharing emotional experience. Tato shared some fears of being involuntarily hospitalized if he discusses passive SI. Reminded Tato of specific circumstances which would lead to involuntary hospitalization and reassured him that there are many less restrictive interventions before this outcome. Tato continues to deny any active planning, has been help-seeking in the past, and is future oriented.     Assessment:   The patient appeared to be active and engaged in today's session and was  "receptive to feedback. Patient endorsed passive suicidal thoughts but denied any intention, urges or planning. Confirmed he would call suicide hotline if he felt this way.     Mental Status:   During interaction with the examiner today, Tato was cooperative, open, and tearful. Patient was generally alert and oriented to person, place, time, and situation. They were casually dressed, appropriately groomed and appeared stated age. Patient's attire was appropriate for the weather and occasion. Eye contact was good. Psychomotor functioning: normal or unremarkable. Speech was normal limits tone, rate, volume; largely coherent and relevant to topic. Mood was \"a little worse\"; affect was full range and appropriate. Thought processes were unremarkable. Thought content was notable for passive suicidal ideation, without any intent, urges, or planning, and contained no evidence of psychosis.  Memory appeared grossly intact without being formally evaluated. Insight: good. Judgment was good. Patient exhibited good impulse control during the appointment.    Does the patient appear to be at imminent risk of harm to self/others at this time? No    The session was necessary to address symptoms of trauma and low mood that have been interfering with patient's ability to function in areas related to interacting and relating with others, maintaining personal health and physical well-being and participating in meaningful activities. Ongoing psychotherapy is necessary to provide counseling.    DSM-5 Diagnosis:  Major Depressive Disorder, Recurrent, Moderate   Other Stressor or Trauma-Related Disorder   R/o Alcohol Use Disorder     Plan:  1. Follow up with this provider on 12/19  2. Utilize crisis numbers as needed; encouraged to reach out to provider for sooner check in, if needed   3.  After Visit Summary will be viewed in [facility] BLANCO, PhD    NOTE: Treatment plan due next session.  Diagnostic assessment update due " 5/05/2023.

## 2022-12-13 ENCOUNTER — OFFICE VISIT (OUTPATIENT)
Dept: PSYCHOLOGY | Facility: CLINIC | Age: 27
End: 2022-12-13
Payer: COMMERCIAL

## 2022-12-13 DIAGNOSIS — F33.1 MODERATE EPISODE OF RECURRENT MAJOR DEPRESSIVE DISORDER (H): Primary | ICD-10-CM

## 2022-12-13 PROCEDURE — 90834 PSYTX W PT 45 MINUTES: CPT | Mod: HN | Performed by: PSYCHOLOGIST

## 2022-12-13 ASSESSMENT — ANXIETY QUESTIONNAIRES
3. WORRYING TOO MUCH ABOUT DIFFERENT THINGS: SEVERAL DAYS
1. FEELING NERVOUS, ANXIOUS, OR ON EDGE: SEVERAL DAYS
GAD7 TOTAL SCORE: 2
2. NOT BEING ABLE TO STOP OR CONTROL WORRYING: NOT AT ALL
GAD7 TOTAL SCORE: 2
6. BECOMING EASILY ANNOYED OR IRRITABLE: NOT AT ALL
5. BEING SO RESTLESS THAT IT IS HARD TO SIT STILL: NOT AT ALL
IF YOU CHECKED OFF ANY PROBLEMS ON THIS QUESTIONNAIRE, HOW DIFFICULT HAVE THESE PROBLEMS MADE IT FOR YOU TO DO YOUR WORK, TAKE CARE OF THINGS AT HOME, OR GET ALONG WITH OTHER PEOPLE: SOMEWHAT DIFFICULT
7. FEELING AFRAID AS IF SOMETHING AWFUL MIGHT HAPPEN: NOT AT ALL

## 2022-12-13 ASSESSMENT — PATIENT HEALTH QUESTIONNAIRE - PHQ9
5. POOR APPETITE OR OVEREATING: NOT AT ALL
SUM OF ALL RESPONSES TO PHQ QUESTIONS 1-9: 15

## 2022-12-15 NOTE — PROGRESS NOTES
Assessment & Plan     Major depressive disorder, recurrent, moderate (H)  Persistent moderate to severe depressive sx, ongoing work with therapist. Past trial sertraline unsuccessful, brief celexa. Reviewed non-SSRI options - will try bupropion.   - buPROPion (WELLBUTRIN XL) 150 MG 24 hr tablet  Dispense: 30 tablet; Refill: 3    ASCUS of cervix with negative high risk HPV  At [facility] - is due for 1 year follow up. Will start vag estradiol 1-2 weeks prior to pap appt.   - estradiol (VAGIFEM) 10 MCG TABS vaginal tablet  Dispense: 14 tablet; Refill: 0    Tobacco use d/o  Discussed off label use of bupropion as smoking cessation aid       Prescription drug management  31 minutes spent on the date of the encounter doing chart review, history and exam, documentation and further activities per the note      Return for Follow up 1-2 months for pap and depression .    Ilene Manzo DO  [facility] LOLI Godwin is a 27 year old presenting for the following health issues:  Follow Up (Medication check )      HPI     Depression Followup    How are you doing with your depression since your last visit? No change        Was feeling better off the Zoloft than on it. Tried the Celexa for two weeks and then stopped it. Mood has just been very, very bad. Has been talking w/ Dr. Caruso -- had a few bumps during thanksgiving but it's been consistent, Monday will be 3 weeks in a row which will have been nice. Has been pretty hard in general.     Sleep is not good. Doesn't have trouble staying asleep - good about not waking up overnight. Has been trying to work on sleep - even if bed at 11. Wakes up and still exhausted.     Social History     Tobacco Use     Smoking status: Every Day     Types: Cigarettes, Vaping Device     Smokeless tobacco: Never   Vaping Use     Vaping Use: Every day     Substances: Nicotine     Devices: Disposable   Substance Use Topics     Alcohol use: Yes     Comment: 7 drink  "per week     Drug use: Never     PHQ 11/8/2022 12/6/2022 12/13/2022   PHQ-9 Total Score 9 16 15   Q9: Thoughts of better off dead/self-harm past 2 weeks Several days More than half the days Several days     ROWDY-7 SCORE 11/8/2022 12/6/2022 12/13/2022   Total Score 2 4 2     Alcohol use: has been talking w/ Meerkins. Worst before Tgiving. Better since. Still uses it to cope.       +++++++++++++++++++++++++++++++++++++++++++++  Pap at [facility], was abnormal - wanted him to get it redone, hasn't   ASCUS, no HPV testing. Before that was normal.       Review of Systems   Pertinent positives and negatives per HPI.        Objective    /87   Pulse 96   Temp 98.3  F (36.8  C) (Oral)   Resp 16   Ht 1.651 m (5' 5\")   Wt 92.6 kg (204 lb 3.2 oz)   SpO2 97%   BMI 33.98 kg/m    Body mass index is 33.98 kg/m .  Physical Exam   GENERAL: alert, cooperative, in no acute distress  HEENT: sclera clear  PULM: normal respiratory effort   NEURO: alert and oriented, grossly intact, moves all extremities, normal gait   SKIN: no rashes or lesions visualized   PSYCH: euthymic affect        "

## 2022-12-16 ENCOUNTER — MYC MEDICAL ADVICE (OUTPATIENT)
Dept: FAMILY MEDICINE | Facility: CLINIC | Age: 27
End: 2022-12-16

## 2022-12-16 ENCOUNTER — OFFICE VISIT (OUTPATIENT)
Dept: FAMILY MEDICINE | Facility: CLINIC | Age: 27
End: 2022-12-16
Payer: COMMERCIAL

## 2022-12-16 VITALS
DIASTOLIC BLOOD PRESSURE: 87 MMHG | HEART RATE: 96 BPM | SYSTOLIC BLOOD PRESSURE: 130 MMHG | BODY MASS INDEX: 34.02 KG/M2 | TEMPERATURE: 98.3 F | OXYGEN SATURATION: 97 % | WEIGHT: 204.2 LBS | RESPIRATION RATE: 16 BRPM | HEIGHT: 65 IN

## 2022-12-16 DIAGNOSIS — F33.1 MAJOR DEPRESSIVE DISORDER, RECURRENT, MODERATE (H): Primary | ICD-10-CM

## 2022-12-16 DIAGNOSIS — R87.610 ASCUS OF CERVIX WITH NEGATIVE HIGH RISK HPV: ICD-10-CM

## 2022-12-16 PROCEDURE — 99214 OFFICE O/P EST MOD 30 MIN: CPT | Performed by: STUDENT IN AN ORGANIZED HEALTH CARE EDUCATION/TRAINING PROGRAM

## 2022-12-16 RX ORDER — ESTRADIOL 10 UG/1
INSERT VAGINAL
Qty: 14 TABLET | Refills: 0 | Status: SHIPPED | OUTPATIENT
Start: 2022-12-16 | End: 2023-02-02

## 2022-12-16 RX ORDER — ESTRADIOL 10 UG/1
INSERT VAGINAL
Qty: 14 TABLET | Refills: 0 | Status: SHIPPED | OUTPATIENT
Start: 2022-12-16 | End: 2022-12-16

## 2022-12-16 RX ORDER — BUPROPION HYDROCHLORIDE 150 MG/1
150 TABLET ORAL EVERY MORNING
Qty: 30 TABLET | Refills: 3 | Status: SHIPPED | OUTPATIENT
Start: 2022-12-16 | End: 2023-01-10

## 2022-12-16 RX ORDER — BUPROPION HYDROCHLORIDE 150 MG/1
150 TABLET ORAL EVERY MORNING
Qty: 30 TABLET | Refills: 3 | Status: SHIPPED | OUTPATIENT
Start: 2022-12-16 | End: 2022-12-16

## 2022-12-16 NOTE — PATIENT INSTRUCTIONS
Patient Education   Here is the plan from today's visit    1. Major depressive disorder, recurrent, moderate (H)  - buPROPion (WELLBUTRIN XL) 150 MG 24 hr tablet; Take 1 tablet (150 mg) by mouth every morning  Dispense: 30 tablet; Refill: 3    2. ASCUS of cervix with negative high risk HPV  - estradiol (VAGIFEM) 10 MCG TABS vaginal tablet; Place one tablet vaginally at night for 7-14 days prior to pap smear  Dispense: 14 tablet; Refill: 0      Please call or return to clinic if your symptoms don't go away.    Follow up plan  Return for Follow up 1-2 months for pap and depression .    Thank you for coming to [facility] today.  Lab Testing:  **If you had lab testing today and your results are reassuring or normal they will be mailed to you or sent through idiag within 7 days.   **If the lab tests need quick action we will call you with the results.  **If you are having labs done on a different day, please call [phone] to schedule at [facility] or [phone] for other [facility] locations. Labs do not offer walk-in appointments.  The phone number we will call with results is # [phone] (home) . If this is not the best number please call our clinic and change the number.  Medication Refills:  If you need any refills please call your pharmacy and they will contact us.   If you need to  your refill at a new pharmacy, please contact the new pharmacy directly. The new pharmacy will help you get your medications transferred faster.   Scheduling:  If you have any concerns about today's visit or wish to schedule another appointment please call our office during normal business hours [phone] (8-5:00 M-F). If you can no longer make a scheduled visit, please cancel via idiag or call us to cancel.   If a referral was made to an [facility] specialty provider and you do not get a call from central scheduling, please refer to directions on your visit summary or call our  office during normal business hours for assistance.   If a Mammogram was ordered for you at the Breast Center call [phone] to schedule or change your appointment.  If you had an XRay/CT/Ultrasound/MRI ordered the number is [phone] to schedule or change your radiology appointment.   [facility] has limited ultrasound appointments available on Wednesdays, if you would like your ultrasound at [facility], please call [phone] to schedule.   Medical Concerns:  If you have urgent medical concerns please call [phone] at any time of the day.    Ilene Manzo, DO

## 2022-12-19 ENCOUNTER — OFFICE VISIT (OUTPATIENT)
Dept: PSYCHOLOGY | Facility: CLINIC | Age: 27
End: 2022-12-19
Payer: COMMERCIAL

## 2022-12-19 DIAGNOSIS — F33.1 MODERATE EPISODE OF RECURRENT MAJOR DEPRESSIVE DISORDER (H): Primary | ICD-10-CM

## 2022-12-19 PROCEDURE — 90834 PSYTX W PT 45 MINUTES: CPT | Mod: HN | Performed by: PSYCHOLOGIST
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
## 2022-12-27 ENCOUNTER — TELEPHONE (OUTPATIENT)
Dept: PSYCHOLOGY | Facility: CLINIC | Age: 27
End: 2022-12-27

## 2022-12-27 NOTE — TELEPHONE ENCOUNTER
Placed call to Tato at 4:25PM to check in about things related to being home with family. He shared that yesterday went pretty smoothly and he's planning on spending a few nights, beginning later this week at friends' and his aunt's, who has been a good support. Tato expressed some desire to communicate with mom about ways he does not feel comfortable or accepted within family. Explored what that conversation might look like. No SI or safety concerns.     Plan:     Call Tato for check in call in one week (1/3)

## 2023-01-03 ENCOUNTER — TELEPHONE (OUTPATIENT)
Dept: PSYCHOLOGY | Facility: CLINIC | Age: 28
End: 2023-01-03

## 2023-01-03 NOTE — TELEPHONE ENCOUNTER
Placed call to Tato at 3:35 PM to check in about things related to being home with family. He spent a couple nights with a friend and then his aunt, which was nice break from parents' house. Discussed balance between asserting himself with immediate family, while also feeling in a vulnerable position at home. Encouraged Tato to focus on self preservation and what feels best for him. No SI or safety concerns.      Plan:      Follow appointment scheduled with this provider on 1/16/23

## 2023-01-07 DIAGNOSIS — F33.1 MAJOR DEPRESSIVE DISORDER, RECURRENT, MODERATE (H): ICD-10-CM

## 2023-01-09 NOTE — TELEPHONE ENCOUNTER
"Request for medication refill:  buPROPion (WELLBUTRIN XL) 150 MG 24 hr tablet    Providers if patient needs an appointment and you are willing to give a one month supply please refill for one month and  send a letter/MyChart using \".SMILLIMITEDREFILL\" .smillimited and route chart to \"P [facility] \" (Giving one month refill in non controlled medications is strongly recommended before denial)    If refill has been denied, meaning absolutely no refills without visit, please complete the smart phrase \".smirxrefuse\" and route it to the \"P SMI MED REFILLS\"  pool to inform the patient and the pharmacy.    Carolyn Peterson MA        "

## 2023-01-10 RX ORDER — BUPROPION HYDROCHLORIDE 150 MG/1
TABLET ORAL
Qty: 30 TABLET | Refills: 3 | Status: SHIPPED | OUTPATIENT
Start: 2023-01-10 | End: 2023-01-24

## 2023-01-16 ENCOUNTER — OFFICE VISIT (OUTPATIENT)
Dept: PSYCHOLOGY | Facility: CLINIC | Age: 28
End: 2023-01-16
Payer: COMMERCIAL

## 2023-01-16 DIAGNOSIS — F33.1 MODERATE EPISODE OF RECURRENT MAJOR DEPRESSIVE DISORDER (H): Primary | ICD-10-CM

## 2023-01-16 PROCEDURE — 90834 PSYTX W PT 45 MINUTES: CPT | Mod: HN | Performed by: PSYCHOLOGIST

## 2023-01-16 ASSESSMENT — ANXIETY QUESTIONNAIRES
IF YOU CHECKED OFF ANY PROBLEMS ON THIS QUESTIONNAIRE, HOW DIFFICULT HAVE THESE PROBLEMS MADE IT FOR YOU TO DO YOUR WORK, TAKE CARE OF THINGS AT HOME, OR GET ALONG WITH OTHER PEOPLE: SOMEWHAT DIFFICULT
GAD7 TOTAL SCORE: 5
6. BECOMING EASILY ANNOYED OR IRRITABLE: MORE THAN HALF THE DAYS
7. FEELING AFRAID AS IF SOMETHING AWFUL MIGHT HAPPEN: NOT AT ALL
5. BEING SO RESTLESS THAT IT IS HARD TO SIT STILL: SEVERAL DAYS
3. WORRYING TOO MUCH ABOUT DIFFERENT THINGS: NOT AT ALL
1. FEELING NERVOUS, ANXIOUS, OR ON EDGE: SEVERAL DAYS
GAD7 TOTAL SCORE: 5
2. NOT BEING ABLE TO STOP OR CONTROL WORRYING: NOT AT ALL

## 2023-01-16 ASSESSMENT — PATIENT HEALTH QUESTIONNAIRE - PHQ9
5. POOR APPETITE OR OVEREATING: SEVERAL DAYS
SUM OF ALL RESPONSES TO PHQ QUESTIONS 1-9: 15

## 2023-01-16 NOTE — PATIENT INSTRUCTIONS
[email or web address]    [email or web address]  (It has a crescent moon on teal background)     Go to tools   Go to Quiet Your Mind

## 2023-01-16 NOTE — PROGRESS NOTES
Behavioral Health Progress Note    Client's Legal Name: Tato Adler    Client's Preferred Name: Tato  YOB: 1995  Type of Service: in person, counseling   Length of Service:   Start time: 10:45AM   End time: 11:30AM   Duration: 45 minutes  Attendees: patient    Identifying Information and Presenting Problem:  Tato is a 27 year old adult being seen for problematic symptoms of depression. Additionally, he has identified an increased pattern of alcohol use in the past 2 years and expresses concern about this pattern.     Treatment Objective(s) Addressed in This Session:  Depressed Mood: Decrease frequency and intensity of feeling down, depressed, hopeless    Progress on / Status of Treatment Objective(s) / Homework:  Satisfactory Progress      Mental Health Screening Questionnaires:  PHQ-9:   PHQ 12/6/2022 12/13/2022 1/16/2023   PHQ-9 Total Score 16 15 15   Q9: Thoughts of better off dead/self-harm past 2 weeks More than half the days Several days More than half the days      ROWDY-7:   ROWDY-7 SCORE 12/6/2022 12/13/2022 1/16/2023   Total Score 4 2 5       Topics Discussed/Interventions Provided:    Mood: Tato reports mood as somewhat lower than previously. He says he has been socially withdrawing. He reports passive SI but denies any intent, urges or planning to act. He said he texted a suicide crisis line on Saturday night when he was feeling very down. Endorses taking Wellbutrin daily since he last met with Dr. Manzo on 12/16. He denies any positive effects or negative side effects.     Alcohol use: shares that alcohol use has increased again. He said he went to sleep without being drunk 3 of 14 days while visiting home. He feels he is dependent on it for falling asleep. Explored motivation and barriers for attending SMART recovery groups. Discussed replacement strategies for alcohol to fall asleep, such as wind down routine and meditation, as well as strategies to impede access,  such as not keeping it in the house.     Assessment:   The patient appeared to be active and engaged in today's session and was receptive to feedback.     Mental Status:   During interaction with the examiner today, Godwin was cooperative, open, and tearful. Patient was generally alert and oriented to person, place, time, and situation. They were casually dressed, appropriately groomed and appeared stated age. Patient's attire was appropriate for the weather and occasion. Eye contact was good. Psychomotor functioning: normal or unremarkable. Speech was normal limits tone, rate, volume; largely coherent and relevant to topic. Mood was depressed; affect was mood congruent. Thought processes were unremarkable. Thought content was notable for passive suicidal ideation, without any intent, urges, or planning, and contained no evidence of psychosis.  Memory appeared grossly intact without being formally evaluated. Insight: good. Judgment was good. Patient exhibited good impulse control during the appointment.    Does the patient appear to be at imminent risk of harm to self/others at this time? No    The session was necessary to address symptoms of trauma and low mood that have been interfering with patient's ability to function in areas related to interacting and relating with others, maintaining personal health and physical well-being and participating in meaningful activities. Ongoing psychotherapy is necessary to provide counseling.    DSM-5 Diagnosis:  Major Depressive Disorder, Recurrent, Moderate   Other Stressor or Trauma-Related Disorder   R/o Alcohol Use Disorder     Plan:  1. Follow up with this provider on 1/31  2. Follow up with Dr. Manzo on 1/24; this provider will check in during ICC   3. Provider will send [employer] message to check on SMART recovery on 1/19   4.  After Visit Summary will be viewed in [facility]     REECE BLANCO, PhD    NOTE: Treatment plan due next session.  Diagnostic assessment update  due 5/05/2023.

## 2023-01-23 NOTE — PROGRESS NOTES
Assessment & Plan     Major depressive disorder, recurrent, moderate (H)  Minimal response thus far. Affirmed the work he is doing w/ decreasing alcohol use and other changes w/ therapy. Discussed options, will increase dose and reassess in 1-2 mos.   - buPROPion (WELLBUTRIN XL) 300 MG 24 hr tablet  Dispense: 30 tablet; Refill: 3    Cervical cancer screening  Hx ASCUS in 2021 with 1 year follow up - had 1 week of vag estrogen prior to pap today for optimal accuracy.   - Pap screen reflex to HPV if ASCUS - recommend age 25 - 29  - HPV Hold (Lab Only)      Diagnosis or treatment significantly limited by social determinants of health - mood disorder, gender identity -- at risk for healthcare bais  Prescription drug management  20 minutes spent on the date of the encounter doing chart review, history and exam, documentation and further activities per the note      Return in about 4 weeks (around 2/21/2023).    Ilene Manzo [facility] LOLI Godwin is a 27 year old presenting for the following health issues:  Recheck Medication (buPROPion (WELLBUTRIN XL) 150 MG 24 hr tablet) and Repeat Pap Smear (Pt is here for pap smear)    HPI     Depression and Anxiety Follow-Up    Sleeping really well   Cut back alcohol past week ago   Still noticing kind of a fogginess     Social History     Tobacco Use     Smoking status: Every Day     Types: Cigarettes, Vaping Device     Smokeless tobacco: Never   Vaping Use     Vaping Use: Every day     Substances: Nicotine     Devices: Disposable   Substance Use Topics     Alcohol use: Yes     Comment: 7 drink per week     Drug use: Never     PHQ 12/13/2022 1/16/2023 1/31/2023   PHQ-9 Total Score 15 15 11   Q9: Thoughts of better off dead/self-harm past 2 weeks Several days More than half the days Several days     ROWDY-7 SCORE 12/13/2022 1/16/2023 1/31/2023   Total Score 2 5 1           Review of Systems   Pertinent positives and negatives per HPI.       "  Objective    /84 (BP Location: Left arm, Patient Position: Sitting, Cuff Size: Adult Large)   Pulse 88   Temp 98  F (36.7  C) (Oral)   Resp 16   Ht 1.676 m (5' 6\")   Wt 92.6 kg (204 lb 3.2 oz)   SpO2 99%   BMI 32.96 kg/m    Body mass index is 32.96 kg/m .  Physical Exam   GENERAL: alert, cooperative, in no acute distress  HEENT: sclera clear  PULM: normal respiratory effort   : normal external genitalia without lesion; expected hair distribution and clitoral size for testosterone therapy. Vaginal mucosa pink and well rugated. Cervix visualized and normal in appearance. Pap collected.  NEURO: alert and oriented, grossly intact, moves all extremities, normal gait   SKIN: no rashes or lesions visualized   PSYCH: reserved affect, appropriate eye contact, adequate insight          "

## 2023-01-24 ENCOUNTER — OFFICE VISIT (OUTPATIENT)
Dept: FAMILY MEDICINE | Facility: CLINIC | Age: 28
End: 2023-01-24
Payer: COMMERCIAL

## 2023-01-24 VITALS
BODY MASS INDEX: 32.82 KG/M2 | SYSTOLIC BLOOD PRESSURE: 130 MMHG | TEMPERATURE: 98 F | HEART RATE: 88 BPM | WEIGHT: 204.2 LBS | RESPIRATION RATE: 16 BRPM | HEIGHT: 66 IN | DIASTOLIC BLOOD PRESSURE: 84 MMHG | OXYGEN SATURATION: 99 %

## 2023-01-24 DIAGNOSIS — Z12.4 CERVICAL CANCER SCREENING: ICD-10-CM

## 2023-01-24 DIAGNOSIS — F33.1 MAJOR DEPRESSIVE DISORDER, RECURRENT, MODERATE (H): Primary | ICD-10-CM

## 2023-01-24 PROCEDURE — 99214 OFFICE O/P EST MOD 30 MIN: CPT | Performed by: STUDENT IN AN ORGANIZED HEALTH CARE EDUCATION/TRAINING PROGRAM

## 2023-01-24 PROCEDURE — G0145 SCR C/V CYTO,THINLAYER,RESCR: HCPCS | Performed by: STUDENT IN AN ORGANIZED HEALTH CARE EDUCATION/TRAINING PROGRAM

## 2023-01-24 RX ORDER — TESTOSTERONE CYPIONATE 200 MG/ML
75 INJECTION, SOLUTION INTRAMUSCULAR
COMMUNITY
Start: 2022-04-20 | End: 2023-07-07

## 2023-01-24 RX ORDER — BUPROPION HYDROCHLORIDE 300 MG/1
300 TABLET ORAL EVERY MORNING
Qty: 30 TABLET | Refills: 3 | Status: SHIPPED | OUTPATIENT
Start: 2023-01-24 | End: 2023-03-01

## 2023-01-30 NOTE — PROGRESS NOTES
Behavioral Health Progress Note    Client's Legal Name: Tato Adler    Client's Preferred Name: Tato  YOB: 1995  Type of Service: in person, counseling   Length of Service:   Start time: 10:30AM   End time: 11:10AM   Duration:  40 minutes  Attendees: patient    Identifying Information and Presenting Problem:  Tato is a 27 year old adult being seen for problematic symptoms of depression. Additionally, he has identified an increased pattern of alcohol use in the past 2 years and expresses concern about this pattern.     Treatment Objective(s) Addressed in This Session:  Depressed Mood: Decrease frequency and intensity of feeling down, depressed, hopeless    Progress on / Status of Treatment Objective(s) / Homework:  Satisfactory Progress      Mental Health Screening Questionnaires:  PHQ-9:   PHQ 12/13/2022 1/16/2023 1/31/2023   PHQ-9 Total Score 15 15 11   Q9: Thoughts of better off dead/self-harm past 2 weeks Several days More than half the days Several days      ROWDY-7:   ROWDY-7 SCORE 12/13/2022 1/16/2023 1/31/2023   Total Score 2 5 1       Topics Discussed/Interventions Provided:    Mood: Tato reports mood as kind of average, not good but not bad, which is a relative improvement. Explored contributors to improve. He's been exercising 4 times/week as of 1/23 and he has also been working to reduce alcohol consumption. Praised for these steps.    Alcohol use: Tato tried several SMART recovery groups week of 1/16, following past session. Processed these experiences. He would like to attend weekly. Discussed goals around use. He is trying to not drink during the week and not alone. Praised major reduction and validated this as a start goal, holding in mind there may be some trial and error. He expressed ambivalence and anxiety around idea of total abstinence, given connection to social life. Reminded him to focus one day at a time.     Assessment:   The patient appeared to be active  "and engaged in today's session and was receptive to feedback.     Mental Status:   During interaction with the examiner today, Godwin was cooperative, open, and tearful. Patient was generally alert and oriented to person, place, time, and situation. They were casually dressed, appropriately groomed and appeared stated age. Patient's attire was appropriate for the weather and occasion. Eye contact was good. Psychomotor functioning: normal or unremarkable. Speech was normal limits tone, rate, volume; largely coherent and relevant to topic. Mood was \"average\"; affect was full range and appropriate. Thought processes were unremarkable. Thought content was notable for occasional passive suicidal ideation, without any intent, urges, or planning, and contained no evidence of psychosis.  Memory appeared grossly intact without being formally evaluated. Insight: good. Judgment was good. Patient exhibited good impulse control during the appointment.    Does the patient appear to be at imminent risk of harm to self/others at this time? No    The session was necessary to address symptoms of trauma and low mood that have been interfering with patient's ability to function in areas related to interacting and relating with others, maintaining personal health and physical well-being and participating in meaningful activities. Ongoing psychotherapy is necessary to provide counseling.    DSM-5 Diagnosis:  Major Depressive Disorder, Recurrent, Moderate   Other Stressor or Trauma-Related Disorder   R/o Alcohol Use Disorder     Plan:  1. Follow up with this provider on 2/14  2. Patient will continue to attend [employer] recovery and exercise 4 times/week   3.  After Visit Summary will be viewed in Bonny BLANCO, PhD    NOTE: Treatment plan update due 7/31/23 session.  Diagnostic assessment update due 5/05/2023.  "

## 2023-01-31 ENCOUNTER — OFFICE VISIT (OUTPATIENT)
Dept: PSYCHOLOGY | Facility: CLINIC | Age: 28
End: 2023-01-31
Payer: COMMERCIAL

## 2023-01-31 DIAGNOSIS — F33.1 MODERATE EPISODE OF RECURRENT MAJOR DEPRESSIVE DISORDER (H): Primary | ICD-10-CM

## 2023-01-31 PROCEDURE — 90834 PSYTX W PT 45 MINUTES: CPT | Mod: HN | Performed by: PSYCHOLOGIST

## 2023-01-31 ASSESSMENT — ANXIETY QUESTIONNAIRES
5. BEING SO RESTLESS THAT IT IS HARD TO SIT STILL: NOT AT ALL
GAD7 TOTAL SCORE: 1
IF YOU CHECKED OFF ANY PROBLEMS ON THIS QUESTIONNAIRE, HOW DIFFICULT HAVE THESE PROBLEMS MADE IT FOR YOU TO DO YOUR WORK, TAKE CARE OF THINGS AT HOME, OR GET ALONG WITH OTHER PEOPLE: SOMEWHAT DIFFICULT
3. WORRYING TOO MUCH ABOUT DIFFERENT THINGS: NOT AT ALL
2. NOT BEING ABLE TO STOP OR CONTROL WORRYING: NOT AT ALL
GAD7 TOTAL SCORE: 1
1. FEELING NERVOUS, ANXIOUS, OR ON EDGE: SEVERAL DAYS
6. BECOMING EASILY ANNOYED OR IRRITABLE: NOT AT ALL
7. FEELING AFRAID AS IF SOMETHING AWFUL MIGHT HAPPEN: NOT AT ALL

## 2023-01-31 ASSESSMENT — PATIENT HEALTH QUESTIONNAIRE - PHQ9
SUM OF ALL RESPONSES TO PHQ QUESTIONS 1-9: 11
5. POOR APPETITE OR OVEREATING: NOT AT ALL

## 2023-02-02 ENCOUNTER — TELEPHONE (OUTPATIENT)
Dept: FAMILY MEDICINE | Facility: CLINIC | Age: 28
End: 2023-02-02
Payer: COMMERCIAL

## 2023-02-02 DIAGNOSIS — R87.611 ATYPICAL SQUAMOUS CELLS CANNOT EXCLUDE HIGH GRADE SQUAMOUS INTRAEPITHELIAL LESION ON CYTOLOGIC SMEAR OF CERVIX (ASC-H): Primary | ICD-10-CM

## 2023-02-02 DIAGNOSIS — N95.2 VAGINAL ATROPHY: ICD-10-CM

## 2023-02-02 LAB
BKR LAB AP GYN ADEQUACY: ABNORMAL
BKR LAB AP GYN INTERPRETATION: ABNORMAL
BKR LAB AP HPV REFLEX: ABNORMAL
BKR LAB AP PREVIOUS ABNL DX: ABNORMAL
BKR LAB AP PREVIOUS ABNORMAL: ABNORMAL
PATH REPORT.COMMENTS IMP SPEC: ABNORMAL
PATH REPORT.COMMENTS IMP SPEC: ABNORMAL
PATH REPORT.RELEVANT HX SPEC: ABNORMAL

## 2023-02-02 RX ORDER — ESTRADIOL 10 UG/1
INSERT VAGINAL
Qty: 14 TABLET | Refills: 0 | Status: SHIPPED | OUTPATIENT
Start: 2023-02-02

## 2023-02-02 NOTE — TELEPHONE ENCOUNTER
Called Tato with ASC-H results. Confirmed past hx of pap with ASCUS in 2021 with no follow up colpo, this pap is the 1 year follow up pap from that result.     Per [facility], recommend colposcopy regardless of HPV testing. Discussed basics of colpo, pt declines pre procedural ativan. Will send vag estrogen - worked well pre pap. Referral order placed.

## 2023-02-13 NOTE — PROGRESS NOTES
Behavioral Health Progress Note    Client's Legal Name: Tato Adler    Client's Preferred Name: Tato  YOB: 1995  Type of Service: in person, counseling   Length of Service:   Start time: 8:25AM   End time: 9:05AM   Duration:  40 minutes  Attendees: patient    Identifying Information and Presenting Problem:  Tato is a 28 year old adult being seen for problematic symptoms of depression. Additionally, he has identified an increased pattern of alcohol use in the past 2 years and expresses concern about this pattern.     Treatment Objective(s) Addressed in This Session:  Depressed Mood: Decrease frequency and intensity of feeling down, depressed, hopeless    Progress on / Status of Treatment Objective(s) / Homework:  Minimal Progress      Mental Health Screening Questionnaires:  PHQ-9:   PHQ 12/13/2022 1/16/2023 1/31/2023   PHQ-9 Total Score 15 15 11   Q9: Thoughts of better off dead/self-harm past 2 weeks Several days More than half the days Several days      ROWDY-7:   ROWDY-7 SCORE 12/13/2022 1/16/2023 1/31/2023   Total Score 2 5 1       Topics Discussed/Interventions Provided:    Processed some anxieties related to upcoming trip and abnormal pap smear results. Tato identifies feelings of self-consciousness related to attending a bachellorette party with several people he knew before his transition, as well as some anxieties about making other people uncomfortable as the only man attending. Engaged in some socratic questioning around source of these feelings. Explored strategies for managing alcohol use during trip.     Tato reports continued feelings of passive SI and feeling depressed, despite increase in Wellbutrin, consistently taking it, decreasing alcohol use and exercising more. He expresses frustration with lack of change and notes feeling numb at times. Tato feels comfortable sticking to current medication routine for a couple of weeks, prior to considering a psychiatric  "consult, per previous discussion with Dr. Manzo.     Assessment:   The patient appeared to be active and engaged in today's session and was receptive to feedback.     Mental Status:   During interaction with the examiner today, Godwin was cooperative, open, and tearful. Patient was generally alert and oriented to person, place, time, and situation. They were casually dressed, appropriately groomed and appeared stated age. Patient's attire was appropriate for the weather and occasion. Eye contact was good. Psychomotor functioning: normal or unremarkable. Speech was normal limits tone, rate, volume; largely coherent and relevant to topic. Mood was \"average\"; affect was full range and appropriate. Thought processes were unremarkable. Thought content was notable for occasional passive suicidal ideation, without any intent, urges, or planning, and contained no evidence of psychosis.  Memory appeared grossly intact without being formally evaluated. Insight: good. Judgment was good. Patient exhibited good impulse control during the appointment.    Does the patient appear to be at imminent risk of harm to self/others at this time? No    The session was necessary to address symptoms of trauma and low mood that have been interfering with patient's ability to function in areas related to interacting and relating with others, maintaining personal health and physical well-being and participating in meaningful activities. Ongoing psychotherapy is necessary to provide counseling.    DSM-5 Diagnosis:  Major Depressive Disorder, Recurrent, Moderate   Other Stressor or Trauma-Related Disorder   R/o Alcohol Use Disorder     Plan:  1. Follow up with this provider on 2/28  2. Patient will continue to attend [employer] recovery and exercise 4 times/week   3. Patient will reach out to Dr. Manzo on 2/22 about psychiatry consult referral if he has not noticed improvement in mood   3.  After Visit Summary will be viewed in MyChart "     REECE BLANCO, PhD    NOTE: Treatment plan update due 7/31/23 session.  Diagnostic assessment update due 5/05/2023.

## 2023-02-14 ENCOUNTER — OFFICE VISIT (OUTPATIENT)
Dept: PSYCHOLOGY | Facility: CLINIC | Age: 28
End: 2023-02-14
Payer: COMMERCIAL

## 2023-02-14 DIAGNOSIS — F33.1 MODERATE EPISODE OF RECURRENT MAJOR DEPRESSIVE DISORDER (H): Primary | ICD-10-CM

## 2023-02-14 PROCEDURE — 90834 PSYTX W PT 45 MINUTES: CPT | Mod: HN | Performed by: PSYCHOLOGIST

## 2023-02-28 ENCOUNTER — OFFICE VISIT (OUTPATIENT)
Dept: FAMILY MEDICINE | Facility: CLINIC | Age: 28
End: 2023-02-28
Payer: COMMERCIAL

## 2023-02-28 ENCOUNTER — OFFICE VISIT (OUTPATIENT)
Dept: PSYCHOLOGY | Facility: CLINIC | Age: 28
End: 2023-02-28
Payer: COMMERCIAL

## 2023-02-28 VITALS
TEMPERATURE: 98.3 F | WEIGHT: 202.2 LBS | SYSTOLIC BLOOD PRESSURE: 115 MMHG | HEIGHT: 66 IN | BODY MASS INDEX: 32.5 KG/M2 | DIASTOLIC BLOOD PRESSURE: 81 MMHG | OXYGEN SATURATION: 97 % | HEART RATE: 100 BPM | RESPIRATION RATE: 16 BRPM

## 2023-02-28 DIAGNOSIS — F33.1 MODERATE EPISODE OF RECURRENT MAJOR DEPRESSIVE DISORDER (H): Primary | ICD-10-CM

## 2023-02-28 DIAGNOSIS — R87.611 ATYPICAL SQUAMOUS CELLS CANNOT EXCLUDE HIGH GRADE SQUAMOUS INTRAEPITHELIAL LESION ON CYTOLOGIC SMEAR OF CERVIX (ASC-H): Primary | ICD-10-CM

## 2023-02-28 PROCEDURE — 99207 PR NO CHARGE LOS: CPT | Performed by: STUDENT IN AN ORGANIZED HEALTH CARE EDUCATION/TRAINING PROGRAM

## 2023-02-28 PROCEDURE — 88305 TISSUE EXAM BY PATHOLOGIST: CPT | Performed by: PATHOLOGY

## 2023-02-28 PROCEDURE — 90834 PSYTX W PT 45 MINUTES: CPT | Mod: HN | Performed by: PSYCHOLOGIST

## 2023-02-28 PROCEDURE — 57456 ENDOCERV CURETTAGE W/SCOPE: CPT | Mod: GC | Performed by: STUDENT IN AN ORGANIZED HEALTH CARE EDUCATION/TRAINING PROGRAM

## 2023-02-28 RX ORDER — ALUMINUM CHLORIDE 20 %
SOLUTION, NON-ORAL TOPICAL
COMMUNITY
Start: 2022-07-23

## 2023-02-28 ASSESSMENT — ANXIETY QUESTIONNAIRES
7. FEELING AFRAID AS IF SOMETHING AWFUL MIGHT HAPPEN: NOT AT ALL
5. BEING SO RESTLESS THAT IT IS HARD TO SIT STILL: NOT AT ALL
3. WORRYING TOO MUCH ABOUT DIFFERENT THINGS: NOT AT ALL
1. FEELING NERVOUS, ANXIOUS, OR ON EDGE: SEVERAL DAYS
GAD7 TOTAL SCORE: 4
6. BECOMING EASILY ANNOYED OR IRRITABLE: SEVERAL DAYS
IF YOU CHECKED OFF ANY PROBLEMS ON THIS QUESTIONNAIRE, HOW DIFFICULT HAVE THESE PROBLEMS MADE IT FOR YOU TO DO YOUR WORK, TAKE CARE OF THINGS AT HOME, OR GET ALONG WITH OTHER PEOPLE: NOT DIFFICULT AT ALL
2. NOT BEING ABLE TO STOP OR CONTROL WORRYING: SEVERAL DAYS
GAD7 TOTAL SCORE: 4

## 2023-02-28 ASSESSMENT — PATIENT HEALTH QUESTIONNAIRE - PHQ9
5. POOR APPETITE OR OVEREATING: SEVERAL DAYS
SUM OF ALL RESPONSES TO PHQ QUESTIONS 1-9: 9

## 2023-02-28 NOTE — PROGRESS NOTES
Colposcopy Procedure Note                                                                       Tato Adler is a 28 year old who presents for an initial colposcopy. On 1/24/2023 a pap-smear had shown Atypical squamous cells of undetermined significance, cannot exclude high-grade squamous intraepithelial lesion ASC-H. They had a hx of ASCUS in 2021. Because of the abnormal follow up pap-smear, colposcopy is recommended.     Patient is on gender-affirming testosterone therapy - thus was sent a pre-procedure vaginal estrogen (as was done for their pap smear).     Faculty: Jennifer Berrios MD was present for and supervised the entire procedure    Resident: Timothy Montoya DO    Past Medical History:   Diagnosis Date     Depressive disorder      Hyperlipidemia      Family History   Problem Relation Age of Onset     Arthritis Mother      Diabetes Mother      Hypertension Mother        Previous history of abnormal paps?: Yes - see above  History of cryotherapy (freezing)?: : No  History of veneral diseases: : No  Do you desire testing for any of these diseases? : No  History of genital warts:  No  Visible warts now?:  No       No LMP recorded. (Menstrual status: Reassignment).    History   Smoking Status     Every Day     Types: Cigarettes, Vaping Device   Smokeless Tobacco     Never     Allergies as of 02/28/2023     (No Known Allergies)      Physical Exam  Genitourinary:              PROCEDURE:  Before the procedure, it was ensured that the patient was educated regarding the nature of her findings to date, the implications of them, and what was to be done. She has been made aware of the role of HPV, the natural history of infection, ways to minimize her future risk, the effect of HPV on the cervix, and treatment options available should they be indicated. The   pathophysiology of the cervix, including a discussion of squamous vs. endometrial cells, and squamous metaplasia have all been reviewed, using  illustrations and sketches. The details of the colposcopic procedure were reviewed, as well as the risks of missed diagnoses, pain, infection and bleeding. All questions were answered before proceeding, and informed consent was therefore obtained.    Bimanual examination: was not done  Unenhanced examination of the cervix with some slight erythema around the cervical os, mainly around 12-1pm   Please refer to images section for details  Pap repeated?:  No  SCJ seen?:  yes  Endocervical speculum needed?:  Yes   ECC done?:  Yes   Lugol's solution used?:  Yes   Satisfactory examination?:  No     Vaginal vault: normal to cursory inspection Yes  Labia normal?:  yes  Perineum normal?:  yes    Patient tolerated procedure well  Colposcopic Impression: Normal exam without visible pathology    Pathology:  [facility]       Assessment: Normal exam without visible pathology  Plan: Follow up with results from ECC  Treatment options discussed with patient.  Call if bleeding > 1 pph, fever, abdominal pain, foul smelling discharge.  post biopsy instructions given to patient.    Timothy Montoya, DO

## 2023-02-28 NOTE — PROGRESS NOTES
"  Behavioral Health Progress Note    Client's Legal Name: Tato Adler    Client's Preferred Name: Tato  YOB: 1995  Type of Service: in person, counseling   Length of Service:   Start time: 8:25AM   End time: 9:05AM   Duration:  40 minutes  Attendees: patient    Identifying Information and Presenting Problem:  Tato is a 28 year old adult being seen for problematic symptoms of depression. Additionally, he has identified an increased pattern of alcohol use in the past 2 years and expresses concern about this pattern.     Treatment Objective(s) Addressed in This Session:  Depressed Mood: Decrease frequency and intensity of feeling down, depressed, hopeless    Progress on / Status of Treatment Objective(s) / Homework:  Minimal Progress      Mental Health Screening Questionnaires:  PHQ-9:   PHQ 12/13/2022 1/16/2023 1/31/2023   PHQ-9 Total Score 15 15 11   Q9: Thoughts of better off dead/self-harm past 2 weeks Several days More than half the days Several days      ROWDY-7:   ROWDY-7 SCORE 12/13/2022 1/16/2023 1/31/2023   Total Score 2 5 1       Topics Discussed/Interventions Provided:    Medication/mood: has been taking 300 mg Wellbutrin since 1/24 PCP visit with minimal improvement, \"maybe glimmers\" of increased mood. He also reports continuing to minimize etoh use and reports one day of heavy drinking in past few weeks (a significant reduction for patient). Discussed option for referral for psychiatry per collaborative plan with Dr. Manzo. Continues to report passive SI and is bothered by these thoughts.    Processed recent distressing interactions that exacerbated insecurities. During recent bachellorette party, women in group spoke disparagingly about men being short and small penis size. Coupled with this, new co-worker/supervisee has made jokes about patient's height. Identified feelings evoked by these comments and empathized with Tato. Validated inappropriateness of comments and " labeled as a type of micro-aggression.    Assessment:   The patient appeared to be active and engaged in today's session and was receptive to feedback.     Mental Status:   During interaction with the examiner today, Godwin was cooperative, open, and tearful. Patient was generally alert and oriented to person, place, time, and situation. They were casually dressed, appropriately groomed and appeared stated age. Patient's attire was appropriate for the weather and occasion. Eye contact was good. Psychomotor functioning: normal or unremarkable. Speech was normal limits tone, rate, volume; largely coherent and relevant to topic. Mood was depressed; affect was mood congruent. Thought processes were unremarkable. Thought content was notable for occasional passive suicidal ideation, without any intent, urges, or planning, and contained no evidence of psychosis.  Memory appeared grossly intact without being formally evaluated. Insight: good. Judgment was good. Patient exhibited good impulse control during the appointment.    Does the patient appear to be at imminent risk of harm to self/others at this time? No    The session was necessary to address symptoms of trauma and low mood that have been interfering with patient's ability to function in areas related to interacting and relating with others, maintaining personal health and physical well-being and participating in meaningful activities. Ongoing psychotherapy is necessary to provide counseling.    DSM-5 Diagnosis:  Major Depressive Disorder, Recurrent, Moderate   Other Stressor or Trauma-Related Disorder   R/o Alcohol Use Disorder     Plan:  1. Follow up with this provider on 3/6  2. Follow up with Dr. Manzo on 3/9  3. Collaborate with PCP to place psychiatry referral   4.  After Visit Summary will be viewed in Bonny BLANCO, PhD    NOTE: Treatment plan update due 7/31/23 session.  Diagnostic assessment update due 5/05/2023.

## 2023-03-01 DIAGNOSIS — F33.1 MAJOR DEPRESSIVE DISORDER, RECURRENT, MODERATE (H): ICD-10-CM

## 2023-03-01 RX ORDER — BUPROPION HYDROCHLORIDE 300 MG/1
TABLET ORAL
Qty: 30 TABLET | Refills: 3 | Status: SHIPPED | OUTPATIENT
Start: 2023-03-01 | End: 2023-04-28

## 2023-03-01 NOTE — TELEPHONE ENCOUNTER
"Request for medication refill:  buPROPion (WELLBUTRIN XL) 300 MG 24 hr tablet    Providers if patient needs an appointment and you are willing to give a one month supply please refill for one month and  send a letter/MyChart using \".SMILLIMITEDREFILL\" .smillimited and route chart to \"P [facility] \" (Giving one month refill in non controlled medications is strongly recommended before denial)    If refill has been denied, meaning absolutely no refills without visit, please complete the smart phrase \".smirxrefuse\" and route it to the \"P SMI MED REFILLS\"  pool to inform the patient and the pharmacy.    Carolyn Peterson MA        "

## 2023-03-03 LAB
PATH REPORT.COMMENTS IMP SPEC: NORMAL
PATH REPORT.COMMENTS IMP SPEC: NORMAL
PATH REPORT.FINAL DX SPEC: NORMAL
PATH REPORT.GROSS SPEC: NORMAL
PATH REPORT.MICROSCOPIC SPEC OTHER STN: NORMAL
PATH REPORT.RELEVANT HX SPEC: NORMAL
PHOTO IMAGE: NORMAL

## 2023-03-04 NOTE — PROGRESS NOTES
Preceptor Attestation:    I discussed the patient with the resident and evaluated the patient in person. I was present for and supervised the entire procedure. I have verified the content of the note, which accurately reflects my assessment of the patient and the plan of care.   Supervising Physician:  Jennifer Berrios MD.

## 2023-03-06 ENCOUNTER — OFFICE VISIT (OUTPATIENT)
Dept: PSYCHOLOGY | Facility: CLINIC | Age: 28
End: 2023-03-06
Payer: COMMERCIAL

## 2023-03-06 DIAGNOSIS — F33.1 MAJOR DEPRESSIVE DISORDER, RECURRENT, MODERATE (H): Primary | ICD-10-CM

## 2023-03-06 PROCEDURE — 90834 PSYTX W PT 45 MINUTES: CPT | Mod: HN | Performed by: PSYCHOLOGIST

## 2023-03-06 ASSESSMENT — ANXIETY QUESTIONNAIRES
6. BECOMING EASILY ANNOYED OR IRRITABLE: SEVERAL DAYS
5. BEING SO RESTLESS THAT IT IS HARD TO SIT STILL: NOT AT ALL
IF YOU CHECKED OFF ANY PROBLEMS ON THIS QUESTIONNAIRE, HOW DIFFICULT HAVE THESE PROBLEMS MADE IT FOR YOU TO DO YOUR WORK, TAKE CARE OF THINGS AT HOME, OR GET ALONG WITH OTHER PEOPLE: SOMEWHAT DIFFICULT
GAD7 TOTAL SCORE: 3
7. FEELING AFRAID AS IF SOMETHING AWFUL MIGHT HAPPEN: NOT AT ALL
2. NOT BEING ABLE TO STOP OR CONTROL WORRYING: NOT AT ALL
1. FEELING NERVOUS, ANXIOUS, OR ON EDGE: SEVERAL DAYS
3. WORRYING TOO MUCH ABOUT DIFFERENT THINGS: NOT AT ALL
GAD7 TOTAL SCORE: 3

## 2023-03-06 ASSESSMENT — PATIENT HEALTH QUESTIONNAIRE - PHQ9
SUM OF ALL RESPONSES TO PHQ QUESTIONS 1-9: 9
5. POOR APPETITE OR OVEREATING: SEVERAL DAYS

## 2023-03-06 NOTE — PROGRESS NOTES
Behavioral Health Progress Note    Client's Legal Name: Tato Adler    Client's Preferred Name: Tato  YOB: 1995  Type of Service: in person, counseling   Length of Service:   Start time: 4:05PM   End time: 4:50PM   Duration:  45 minutes  Attendees: patient    Identifying Information and Presenting Problem:  Tato is a 28 year old adult being seen for problematic symptoms of depression. Additionally, he has identified an increased pattern of alcohol use in the past 2 years and expresses concern about this pattern.     Treatment Objective(s) Addressed in This Session:  Depressed Mood: Decrease frequency and intensity of feeling down, depressed, hopeless    Progress on / Status of Treatment Objective(s) / Homework:  Minimal Progress      Mental Health Screening Questionnaires:  PHQ-9:   PHQ 1/31/2023 2/28/2023 3/6/2023   PHQ-9 Total Score 11 9 9   Q9: Thoughts of better off dead/self-harm past 2 weeks Several days Several days Several days      ROWDY-7:   ROWDY-7 SCORE 1/31/2023 2/28/2023 3/6/2023   Total Score 1 4 3       Topics Discussed/Interventions Provided:    Tato shares that he is feeling a bit better and had a good weekend. Celebrated a number of accomplishments and positive changes at work, as well as additional contributors to improved mood. Processed experience of mindful activities and explored additional options, as well as how this helps increase awareness of negative thoughts and to redirect from them. .     Assessment:   The patient appeared to be active and engaged in today's session and was receptive to feedback.     Mental Status:   During interaction with the examiner today, Tato was cooperative, open, and engaged.Patient was generally alert and oriented to person, place, time, and situation. They were casually dressed, appropriately groomed and appeared stated age. Patient's attire was appropriate for the weather and occasion. Eye contact was good. Psychomotor  "functioning: normal or unremarkable. Speech was normal limits tone, rate, volume; largely coherent and relevant to topic. Mood was \"pretty good\"; affect was euthymic and noticeably brighter. Thought processes were unremarkable. Thought content was notable for occasional passive suicidal ideation, without any intent, urges, or planning, and contained no evidence of psychosis.  Memory appeared grossly intact without being formally evaluated. Insight: good. Judgment was good. Patient exhibited good impulse control during the appointment.    Does the patient appear to be at imminent risk of harm to self/others at this time? No    The session was necessary to address symptoms of trauma and low mood that have been interfering with patient's ability to function in areas related to interacting and relating with others, maintaining personal health and physical well-being and participating in meaningful activities. Ongoing psychotherapy is necessary to provide counseling.    DSM-5 Diagnosis:  Major Depressive Disorder, Recurrent, Moderate   Other Stressor or Trauma-Related Disorder   R/o Alcohol Use Disorder     Plan:  1. Follow up with this provider on 21  2. Follow up with Dr. Manzo on 3/9  3. Mindfulness [facility] [employer] and sent via [employer]   4. Collaborate with PCP to place psychiatry referral   5.  After Visit Summary will be viewed in [employer]     REECE BLANCO, PhD    NOTE: Treatment plan update due 7/31/23 session.  Diagnostic assessment update due 5/05/2023.  "

## 2023-03-06 NOTE — PATIENT INSTRUCTIONS
[email or web address]

## 2023-03-20 NOTE — PROGRESS NOTES
Behavioral Health Progress Note    Client's Legal Name: Tato Adler    Client's Preferred Name: Tato  YOB: 1995  Type of Service: in person, counseling   Length of Service:   Start time: 820AM   End time: 9:00AM   Duration:  40 minutes  Attendees: patient    Identifying Information and Presenting Problem:  Tato is a 28 year old adult being seen for problematic symptoms of depression. Additionally, he has identified an increased pattern of alcohol use in the past 2 years and expresses concern about this pattern.     Treatment Objective(s) Addressed in This Session:  Depressed Mood: Decrease frequency and intensity of feeling down, depressed, hopeless    Progress on / Status of Treatment Objective(s) / Homework:  Satisfactory Progress      Mental Health Screening Questionnaires:  PHQ-9:   PHQ 1/31/2023 2/28/2023 3/6/2023   PHQ-9 Total Score 11 9 9   Q9: Thoughts of better off dead/self-harm past 2 weeks Several days Several days Several days      ROWDY-7:   ROWDY-7 SCORE 1/31/2023 2/28/2023 3/6/2023   Total Score 1 4 3       Topics Discussed/Interventions Provided:      Mood - has been quite busy so he has been pretty distracted; has upcoming exciting trips, visitors and conference for work. He has been going to the gym some.     Physical insecurities - notes feeling some insecurities going to gym. Processed what is triggering, and identified feeling exposed and fears of judgment regarding appearance and hips, which brings up feelings he experienced prior to transitioning. Praised for many cognitive re-framing strategies he used to work through feelings at gym. Shared some psychoeducation on manifestation of past trauma/discomfort in the body, and explored body-based mindfulness practices he can try.       Assessment:   The patient appeared to be active and engaged in today's session and was receptive to feedback.     Mental Status:   During interaction with the examiner today, Tato  "was cooperative, open, and engaged.Patient was generally alert and oriented to person, place, time, and situation. They were casually dressed, appropriately groomed and appeared stated age. Patient's attire was appropriate for the weather and occasion. Eye contact was good. Psychomotor functioning: normal or unremarkable. Speech was normal limits tone, rate, volume; largely coherent and relevant to topic. Mood was \"pretty good\"; affect was euthymic and noticeably brighter. Thought processes were unremarkable. Thought content was notable for occasional passive suicidal ideation, without any intent, urges, or planning, and contained no evidence of psychosis.  Memory appeared grossly intact without being formally evaluated. Insight: good. Judgment was good. Patient exhibited good impulse control during the appointment.    Does the patient appear to be at imminent risk of harm to self/others at this time? No    The session was necessary to address symptoms of trauma and low mood that have been interfering with patient's ability to function in areas related to interacting and relating with others, maintaining personal health and physical well-being and participating in meaningful activities. Ongoing psychotherapy is necessary to provide counseling.    DSM-5 Diagnosis:  Major Depressive Disorder, Recurrent, Moderate   Other Stressor or Trauma-Related Disorder   R/o Alcohol Use Disorder     Plan:  1. Follow up with this provider on 4/4  2. Follow up with Dr. Manzo on 3/24  3.  After Visit Summary will be viewed in Bon[employer] BLANCO, PhD    NOTE: Treatment plan update due 7/31/23 session.  Diagnostic assessment update due 5/05/2023.  "

## 2023-03-21 ENCOUNTER — OFFICE VISIT (OUTPATIENT)
Dept: PSYCHOLOGY | Facility: CLINIC | Age: 28
End: 2023-03-21
Payer: COMMERCIAL

## 2023-03-21 DIAGNOSIS — F33.1 MAJOR DEPRESSIVE DISORDER, RECURRENT, MODERATE (H): Primary | ICD-10-CM

## 2023-03-21 PROCEDURE — 90834 PSYTX W PT 45 MINUTES: CPT | Mod: HN | Performed by: PSYCHOLOGIST

## 2023-04-10 ENCOUNTER — OFFICE VISIT (OUTPATIENT)
Dept: PSYCHOLOGY | Facility: CLINIC | Age: 28
End: 2023-04-10
Payer: COMMERCIAL

## 2023-04-10 DIAGNOSIS — F33.1 MAJOR DEPRESSIVE DISORDER, RECURRENT, MODERATE (H): Primary | ICD-10-CM

## 2023-04-10 PROCEDURE — 90834 PSYTX W PT 45 MINUTES: CPT | Performed by: PSYCHOLOGIST

## 2023-04-10 ASSESSMENT — PATIENT HEALTH QUESTIONNAIRE - PHQ9: SUM OF ALL RESPONSES TO PHQ QUESTIONS 1-9: 10

## 2023-04-10 NOTE — Clinical Note
Eliel Odom, I went ahead and placed a [facility] referral for Godwin. He was to discuss with you at next appt but kept having to cancel his meetings with you. If you (and he) decide to try something else before utilizing [facility], then please disregard but wanted to get the ball moving!

## 2023-04-10 NOTE — PROGRESS NOTES
Behavioral Health Progress Note    Client's Legal Name: Tato Adler    Client's Preferred Name: Tato  YOB: 1995  Type of Service: in person, counseling   Length of Service:   Start time: 4:05PM   End time: 445 M   Duration:  40 minutes  Attendees: patient    Identifying Information and Presenting Problem:  Tato is a 28 year old adult being seen for problematic symptoms of depression. Additionally, he has identified an increased pattern of alcohol use in the past 2 years and expresses concern about this pattern.     Treatment Objective(s) Addressed in This Session:  Depressed Mood: Decrease frequency and intensity of feeling down, depressed, hopeless    Progress on / Status of Treatment Objective(s) / Homework:  Satisfactory Progress      Mental Health Screening Questionnaires:  PHQ-9:       2/28/2023     3:49 PM 3/6/2023     4:10 PM 4/10/2023     4:18 PM   PHQ   PHQ-9 Total Score 9 9 10   Q9: Thoughts of better off dead/self-harm past 2 weeks Several days Several days Several days      ROWDY-7:       1/31/2023    10:37 AM 2/28/2023     3:49 PM 3/6/2023     4:10 PM   ROWDY-7 SCORE   Total Score 1 4 3       Topics Discussed/Interventions Provided:    Medication: patient is eager to change medication, given persistent depressive symptoms. He needed to cancel recent appointments with Dr. Manzo due to travel. Discussed process of placing [facility] and patient was interested.      Alcohol use - engaged in motivational interviewing regarding current alcohol use. Tato identifies social facilitation as a motivation for use. Explored additional resources and community outlets. He is in process of trying to arrange a volunteer opportunity and has identified a [facility] group to attend when is able. Discussed AA and [facility] options.       Assessment:   The patient appeared to be active and engaged in today's session and was receptive to feedback.     Mental Status:   During  "interaction with the examiner today, Godwin was cooperative, open, and engaged.Patient was generally alert and oriented to person, place, time, and situation. They were casually dressed, appropriately groomed and appeared stated age. Patient's attire was appropriate for the weather and occasion. Eye contact was good. Psychomotor functioning: normal or unremarkable. Speech was normal limits tone, rate, volume; largely coherent and relevant to topic. Mood was \"pretty good\"; affect was euthymic and noticeably brighter. Thought processes were unremarkable. Thought content was notable for occasional passive suicidal ideation, without any intent, urges, or planning, and contained no evidence of psychosis.  Memory appeared grossly intact without being formally evaluated. Insight: good. Judgment was good. Patient exhibited good impulse control during the appointment.    Does the patient appear to be at imminent risk of harm to self/others at this time? No    The session was necessary to address symptoms of trauma and low mood that have been interfering with patient's ability to function in areas related to interacting and relating with others, maintaining personal health and physical well-being and participating in meaningful activities. Ongoing psychotherapy is necessary to provide counseling.    DSM-5 Diagnosis:  Major Depressive Disorder, Recurrent, Moderate   Other Stressor or Trauma-Related Disorder   R/o Alcohol Use Disorder     Plan:  1. Follow up with this provider on 4/18  2. Placed [facility] referral   3. Follow up with Dr. Manzo on 4/28  4.  After Visit Summary will be viewed in Bon[employer] BLANCO, PhD    NOTE: Treatment plan update due 7/31/23 session.  Diagnostic assessment update due 5/05/2023.  "

## 2023-04-18 ENCOUNTER — VIRTUAL VISIT (OUTPATIENT)
Dept: PSYCHOLOGY | Facility: CLINIC | Age: 28
End: 2023-04-18
Payer: COMMERCIAL

## 2023-04-18 DIAGNOSIS — F33.1 MAJOR DEPRESSIVE DISORDER, RECURRENT, MODERATE (H): Primary | ICD-10-CM

## 2023-04-18 PROCEDURE — 90834 PSYTX W PT 45 MINUTES: CPT | Mod: VID | Performed by: PSYCHOLOGIST

## 2023-04-18 NOTE — PROGRESS NOTES
Telemedicine Visit: The patient's condition can be safely assessed and treated via synchronous audio and visual telemedicine encounter.      Reason for Telemedicine Visit: Patient unable to travel    Originating Site (Patient Location): Patient's home    Distant Location (provider location):  On-site    Consent:  The patient/guardian has verbally consented to: the potential risks and benefits of telemedicine (video visit) versus in person care; bill my insurance or make self-payment for services provided; and responsibility for payment of non-covered services.     Mode of Communication:  Video Conference via [employer]    As the provider I attest to compliance with applicable laws and regulations related to telemedicine.    Behavioral Health Progress Note    Client's Legal Name: Tato Adler    Client's Preferred Name: Tato  YOB: 1995  Type of Service: video, counseling   Length of Service:   Start time: 8:20AM   End time: 9:00AM   Duration:  40 minutes  Attendees: patient    Identifying Information and Presenting Problem:  Tato is a 28 year old adult being seen for problematic symptoms of depression. Additionally, he has identified an increased pattern of alcohol use in the past 2 years and expresses concern about this pattern.     Treatment Objective(s) Addressed in This Session:  Depressed Mood: Decrease frequency and intensity of feeling down, depressed, hopeless    Progress on / Status of Treatment Objective(s) / Homework:  Satisfactory Progress      Mental Health Screening Questionnaires:  PHQ-9:       2/28/2023     3:49 PM 3/6/2023     4:10 PM 4/10/2023     4:18 PM   PHQ   PHQ-9 Total Score 9 9 10   Q9: Thoughts of better off dead/self-harm past 2 weeks Several days Several days Several days      ROWDY-7:       1/31/2023    10:37 AM 2/28/2023     3:49 PM 3/6/2023     4:10 PM   ROWDY-7 SCORE   Total Score 1 4 3       Topics Discussed/Interventions Provided:    Alcohol use -  attended social gatherings without drinking this weekend and reported having a really good time and it being somewhat easier not to drink than expected. Processed urges and identified social discomfort as one motivator for drinking. Praised for progress!     Piatt setting- processed work relationships and how employees/co-workers can interfere with his ability to complete other work. Identified value of wanting to be available to others. Explored how this can lead him to feeling drained and potentially resentful. Identified barrier of not wanting to make others feel bad for setting and keeping firm boundaries.    Assessment:   The patient appeared to be active and engaged in today's session and was receptive to feedback. Preparation -     Mental Status:   During interaction with the examiner today, Godwin was cooperative, open, and engaged.Patient was generally alert and oriented to person, place, time, and situation. They were casually dressed, appropriately groomed and appeared stated age. Patient's attire was appropriate for the weather and occasion. Eye contact was good. Psychomotor functioning: normal or unremarkable. Speech was normal limits tone, rate, volume; largely coherent and relevant to topic. Mood was unremarkable; affect was full range and appropriate. Thought processes were unremarkable. Thought content was notable for occasional passive suicidal ideation, without any intent, urges, or planning, and contained no evidence of psychosis.  Memory appeared grossly intact without being formally evaluated. Insight: good. Judgment was good. Patient exhibited good impulse control during the appointment.    Does the patient appear to be at imminent risk of harm to self/others at this time? No    The session was necessary to address symptoms of trauma and low mood that have been interfering with patient's ability to function in areas related to interacting and relating with others, maintaining personal  health and physical well-being and participating in meaningful activities. Ongoing psychotherapy is necessary to provide counseling.    DSM-5 Diagnosis:  Major Depressive Disorder, Recurrent, Moderate   Other Stressor or Trauma-Related Disorder   R/o Alcohol Use Disorder     Plan:  1. Follow up with this provider on 4/24   2. Follow up with Dr. Manzo on 4/28  3. [facility] referral placed  4.  After Visit Summary will be viewed in Bonny BLANCO, PhD    NOTE: Treatment plan update due 7/31/23 session.  Diagnostic assessment update due 5/05/2023.

## 2023-04-27 NOTE — PROGRESS NOTES
Assessment & Plan     Severe episode of recurrent major depressive disorder, without psychotic features (H)  Moderate risk. Self-discontinued bupropion 2 weeks ago thinking it was ineffective but now with significantly worsened depressive and SI. Reports passive/fleeting plan with no access to firearms and no history of any prior self-harm behavior. Strong protective factors and social supports. Does not feel inpatient level care would be helpful and I do not see clear clinical indication at this time. Able to safety plan and identify threshold for seeking ED eval. Discussed options and pt open to restarting bupropion. Also discussed escalation of care and I do think IOP would be an appropriate option if mood sx aren't quickly improving w/ restarting medication. Referral placed. Has close follow up with  Dr. Caruso on Tuesday, will message her as FYI as well and can follow up on IOP referral and additional resources as needed.   - Adult Mental Health  Referral  - bupropion 150mg x1 week and increase to 300mg, pt has 150mg caps at home still   - buPROPion (WELLBUTRIN XL) 300 MG 24 hr tablet  Dispense: 30 tablet; Refill: 4  - discussed crisis resources including consider [facility] ED for [facility] unit if SI becomes active or needing urgent eval     Gender dysphoria  Doing well on current T dose. Due for annual labs.   - Lipid panel reflex to direct LDL Fasting  - Comprehensive metabolic panel  - Hemoglobin  - Testosterone total  - testosterone cypionate (DEPOTESTOSTERONE) 200 MG/ML injection  Dispense: 10 mL; Refill: 1    Axillary hyperhidrosis  Effective   - aluminum chloride (DRYSOL) 20 % external solution  Dispense: 60 mL; Refill: 3      Prescription drug management  35 minutes spent by me on the date of the encounter doing chart review, history and exam, documentation and further activities per the note    Return in 13 days (on 5/11/2023) for Follow up, with me.    [facility]  "[facility] LOLI Godwin is a 28 year old, presenting for the following health issues:  RECHECK (Pt states that he is here for a med/depression follow up)      HPI     Depression and Anxiety Follow-Up  Scheduled w/ Psych 5/25     Stopped bupropion - 2 weeks ago   Felt like it wasn't working    Now feels really bad   - noticing way more suicidal thoughts. Pretty much every day. Especially when he's alone, it gets a lot worse. Really a lack of motivation especially when he's sober. \"I feel like I have to drink to do anything\" in terms of cleaning, doing laundry, etc. Hard to sit at home alone at night. Has been doing pretty good w/ not drinking.     Good friend will be 4 weeks sober. Has been spending a lot of time w/ her. That has been helpful. Spent time over the weekend at her parents house, has been able to spend time w friedns and not drink     SI: \"I don't want to be locked up.\" Does report ideation of \"wow if I had a gun that would be really simple\" but has no access to firearms. No other plans or specific thoughts. Denies any past history of any self-harm behaviors.     Safety planning: reaches out to friend or mom to chat when things get bad. Doesn't think friends know how bad he is right now. Thinks it would take \"a lot\" to go to the ED. Has used suicide hotline before but not recently, would use that again as well.     Denies any hallucinations. Reports sleeping 15-16h/day if home alone with no activities planned       Social History     Tobacco Use     Smoking status: Never     Passive exposure: Past     Smokeless tobacco: Never   Vaping Use     Vaping status: Every Day     Substances: Nicotine, Flavoring     Devices: Refillable tank   Substance Use Topics     Alcohol use: Yes     Comment: 7 drink per week     Drug use: Never         2/28/2023     3:49 PM 3/6/2023     4:10 PM 4/10/2023     4:18 PM   PHQ   PHQ-9 Total Score 9 9 10   Q9: Thoughts of better off dead/self-harm past 2 " "weeks Several days Several days Several days         1/31/2023    10:37 AM 2/28/2023     3:49 PM 3/6/2023     4:10 PM   ROWDY-7 SCORE   Total Score 1 4 3         Review of Systems   Pertinent positives and negatives per HPI.        Objective    /84 (BP Location: Left arm, Patient Position: Sitting, Cuff Size: Adult Regular)   Pulse 98   Temp 98  F (36.7  C) (Oral)   Resp 20   Ht 1.651 m (5' 5\")   Wt 91.8 kg (202 lb 6.4 oz)   SpO2 99%   BMI 33.68 kg/m    Body mass index is 33.68 kg/m .  Physical Exam   GENERAL: alert, cooperative, in no acute distress  HEENT: sclera clear  PULM: normal respiratory effort   NEURO: alert and oriented, grossly intact, moves all extremities, normal gait   SKIN: no rashes or lesions visualized   PSYCH: alert, good eye contact, demonstrates adequate insight and judgment. Somewhat restricted affect, tearful when discussing mood symptoms.          "

## 2023-04-28 ENCOUNTER — OFFICE VISIT (OUTPATIENT)
Dept: FAMILY MEDICINE | Facility: CLINIC | Age: 28
End: 2023-04-28
Payer: COMMERCIAL

## 2023-04-28 VITALS
BODY MASS INDEX: 33.72 KG/M2 | TEMPERATURE: 98 F | SYSTOLIC BLOOD PRESSURE: 125 MMHG | OXYGEN SATURATION: 99 % | RESPIRATION RATE: 20 BRPM | WEIGHT: 202.4 LBS | HEIGHT: 65 IN | DIASTOLIC BLOOD PRESSURE: 84 MMHG | HEART RATE: 98 BPM

## 2023-04-28 DIAGNOSIS — F33.2 SEVERE EPISODE OF RECURRENT MAJOR DEPRESSIVE DISORDER, WITHOUT PSYCHOTIC FEATURES (H): Primary | ICD-10-CM

## 2023-04-28 DIAGNOSIS — L74.510 AXILLARY HYPERHIDROSIS: ICD-10-CM

## 2023-04-28 DIAGNOSIS — F64.9 GENDER DYSPHORIA: ICD-10-CM

## 2023-04-28 PROCEDURE — 99214 OFFICE O/P EST MOD 30 MIN: CPT | Performed by: STUDENT IN AN ORGANIZED HEALTH CARE EDUCATION/TRAINING PROGRAM

## 2023-04-28 RX ORDER — TESTOSTERONE CYPIONATE 200 MG/ML
50 INJECTION, SOLUTION INTRAMUSCULAR WEEKLY
Qty: 10 ML | Refills: 1 | Status: SHIPPED | OUTPATIENT
Start: 2023-04-28 | End: 2024-01-02

## 2023-04-28 RX ORDER — BUPROPION HYDROCHLORIDE 300 MG/1
300 TABLET ORAL EVERY MORNING
Qty: 30 TABLET | Refills: 4 | Status: SHIPPED | OUTPATIENT
Start: 2023-04-28 | End: 2023-08-28

## 2023-04-28 NOTE — PATIENT INSTRUCTIONS
Patient Education   Here is the plan from today's visit    1. Major depressive disorder, recurrent, moderate (H)  Take 150mg daily for 7 days. Then increase to 300mg once daily.     - [facility] unit is a place to go for severe mental health symptoms     2. Axillary hyperhidrosis  Drysol refill     3. Testosterone refill       Please call or return to clinic if your symptoms don't go away.    Follow up plan  Return in 13 days (on 5/11/2023) for Follow up, with me.    Thank you for coming to [facility] today.  Lab Testing:  **If you had lab testing today and your results are reassuring or normal they will be mailed to you or sent through [employer] within 7 days.   **If the lab tests need quick action we will call you with the results.  **If you are having labs done on a different day, please call [phone] to schedule at [facility] or [phone] for other [facility]patient [facility] locations. Labs do not offer walk-in appointments.  The phone number we will call with results is # [phone] (home) . If this is not the best number please call our clinic and change the number.  Medication Refills:  If you need any refills please call your pharmacy and they will contact us.   If you need to  your refill at a new pharmacy, please contact the new pharmacy directly. The new pharmacy will help you get your medications transferred faster.   Scheduling:  If you have any concerns about today's visit or wish to schedule another appointment please call our office during normal business hours [phone] (8-5:00 M-F). If you can no longer make a scheduled visit, please cancel via [employer] or call us to cancel.   If a referral was made to an [facility] specialty provider and you do not get a call from central scheduling, please refer to directions on your visit summary or call our office during normal business hours for assistance.   If a Mammogram was ordered for you at the [facility] call  [phone] to schedule or change your appointment.  If you had an XRay/CT/Ultrasound/MRI ordered the number is [phone] to schedule or change your radiology appointment.   [facility] has limited ultrasound appointments available on Wednesdays, if you would like your ultrasound at [facility], please call [phone] to schedule.   Medical Concerns:  If you have urgent medical concerns please call [phone] at any time of the day.    Ilene Manzo, DO

## 2023-05-02 ENCOUNTER — TELEPHONE (OUTPATIENT)
Dept: PSYCHOLOGY | Facility: CLINIC | Age: 28
End: 2023-05-02
Payer: COMMERCIAL

## 2023-05-02 NOTE — TELEPHONE ENCOUNTER
Placed check in call to Tato since he had to cancel this morning's appointment. He shared that passive thoughts of SI he was having have decreased since restarting the Wellbutrin on Friday. Provided some information on IOP referral and intake process, as well as possibility for dual diagnoses programs, which Tato expressed sounded like a good idea. Introduced DBT programming as an option as well. Tato expressed feeling more hopefull with IOP and [facility] referrals and work has been going well this week. Confirmed plan of using crisis resources as needed.     Plan:     Tato will call [facility] to schedule IOP intake (gave number)     Follow up appointment with this provider on 5/8; encouraged to reach out sooner as needed    Follow up with Dr. Manzo on 5/11    [facility] appointment on 5/25    Allan Caruso, PhD, LP   she/her/hers  Primary Care Behavioral Health Fellow

## 2023-05-08 ENCOUNTER — OFFICE VISIT (OUTPATIENT)
Dept: PSYCHOLOGY | Facility: CLINIC | Age: 28
End: 2023-05-08
Payer: COMMERCIAL

## 2023-05-08 DIAGNOSIS — F33.1 MODERATE EPISODE OF RECURRENT MAJOR DEPRESSIVE DISORDER (H): Primary | ICD-10-CM

## 2023-05-08 PROCEDURE — 90834 PSYTX W PT 45 MINUTES: CPT | Performed by: PSYCHOLOGIST

## 2023-05-08 NOTE — PROGRESS NOTES
Behavioral Health Progress Note    Client's Legal Name: Tato Adler    Client's Preferred Name: Tato  YOB: 1995  Type of Service: video, counseling   Length of Service:   Start time: 8:25AM   End time: 9:15AM   Duration:  50 minutes  Attendees: patient    Identifying Information and Presenting Problem:  Tato is a 28 year old adult being seen for problematic symptoms of depression. Additionally, he has identified an increased pattern of alcohol use in the past 2 years and expresses concern about this pattern.     Treatment Objective(s) Addressed in This Session:  Depressed Mood: Decrease frequency and intensity of feeling down, depressed, hopeless    Progress on / Status of Treatment Objective(s) / Homework:  Satisfactory Progress      Mental Health Screening Questionnaires:  PHQ-9:       2/28/2023     3:49 PM 3/6/2023     4:10 PM 4/10/2023     4:18 PM   PHQ   PHQ-9 Total Score 9 9 10   Q9: Thoughts of better off dead/self-harm past 2 weeks Several days Several days Several days      ROWDY-7:       1/31/2023    10:37 AM 2/28/2023     3:49 PM 3/6/2023     4:10 PM   ROWDY-7 SCORE   Total Score 1 4 3       Topics Discussed/Interventions Provided:    [place] setting- processed frustrations with co-worker not respecting time and space, and explored barriers that interfere with setting boundaries. Praised for efforts to work toward firmer boundaries.     Family - processed phone call with father, during which he dead named Tato. Identified feelings of sadness, anger, and loneliness. Validated feelings. Explored Tato's tendency to protect others in family by not rocking the boat.     Treatment - discussed treatment options, including IOP, dual diagnoses, and DBT. Tato expressed some concerns about scheduling and time commitment. Shared recommendation to engage in dual diagnosis program and options for evening programs.      Assessment:   The patient appeared to be active and engaged  in today's session and was receptive to feedback.     Mental Status:   During interaction with the examiner today, Godwin was cooperative, open, engaged, and tearful.Patient was generally alert and oriented to person, place, time, and situation. They were casually dressed, appropriately groomed and appeared stated age. Patient's attire was appropriate for the weather and occasion. Eye contact was good. Psychomotor functioning: normal or unremarkable. Speech was normal limits tone, rate, volume; largely coherent and relevant to topic. Mood was depressed; affect was mood congruent. Thought processes were unremarkable. Thought content was notable for passive suicidal ideation, without any intent, urges, or planning, and contained no evidence of psychosis.  Memory appeared grossly intact without being formally evaluated. Insight: good. Judgment was good. Patient exhibited good impulse control during the appointment.    Does the patient appear to be at imminent risk of harm to self/others at this time? No    The session was necessary to address symptoms of trauma and low mood that have been interfering with patient's ability to function in areas related to interacting and relating with others, maintaining personal health and physical well-being and participating in meaningful activities. Ongoing psychotherapy is necessary to provide counseling.    DSM-5 Diagnosis:  Major Depressive Disorder, Recurrent, Moderate   Other Stressor or Trauma-Related Disorder   R/o Alcohol Use Disorder     Plan:  1. Follow up with this provider on 5/15  2. Follow up with Dr. Manzo on 5/11 (this provider will also touch base at visit   3. [facility] appointment on 5/25  4. Coached on utilizing crisis resources/presenting to [facility] unit   5.  After Visit Summary will be viewed in Bonny BLANCO, PhD    NOTE: Treatment plan update due 7/31/23 session.  Diagnostic assessment update due 5/15/2023.

## 2023-05-08 NOTE — PATIENT INSTRUCTIONS
Options:     Dual diagnosis -     1) [facility] Access: [phone]      2) [facility]     DBT options -     [email or web address]-[email or web address]-[email or web address]#:~:text=We%20offer%20all%2Dgender%2Finclusive,Call%05362.870.    [email or web address]

## 2023-05-11 ENCOUNTER — OFFICE VISIT (OUTPATIENT)
Dept: FAMILY MEDICINE | Facility: CLINIC | Age: 28
End: 2023-05-11
Payer: COMMERCIAL

## 2023-05-11 VITALS
HEART RATE: 111 BPM | WEIGHT: 202 LBS | SYSTOLIC BLOOD PRESSURE: 129 MMHG | TEMPERATURE: 97.8 F | BODY MASS INDEX: 33.66 KG/M2 | OXYGEN SATURATION: 100 % | DIASTOLIC BLOOD PRESSURE: 87 MMHG | HEIGHT: 65 IN | RESPIRATION RATE: 18 BRPM

## 2023-05-11 DIAGNOSIS — F33.2 SEVERE EPISODE OF RECURRENT MAJOR DEPRESSIVE DISORDER, WITHOUT PSYCHOTIC FEATURES (H): Primary | ICD-10-CM

## 2023-05-11 DIAGNOSIS — Z86.19 HISTORY OF GONORRHEA: ICD-10-CM

## 2023-05-11 PROCEDURE — 99214 OFFICE O/P EST MOD 30 MIN: CPT | Performed by: STUDENT IN AN ORGANIZED HEALTH CARE EDUCATION/TRAINING PROGRAM

## 2023-05-11 ASSESSMENT — PATIENT HEALTH QUESTIONNAIRE - PHQ9: SUM OF ALL RESPONSES TO PHQ QUESTIONS 1-9: 11

## 2023-05-11 NOTE — PROGRESS NOTES
"  Assessment & Plan     Severe episode of recurrent major depressive disorder, without psychotic features (H)  Severe worsening of SI after self discontinuation of bupropion. Now stabilized on bupropion 300mg but still with significant impairment. Well connected with [facility], upcoming [facility] provider visit and IOP intake. Strong protective factors. No changse to medications today. Will continue to follow along closely.     History of gonorrhea  Appropriately treated, will confirm ROSINA   - Chlamydia trachomatis/Neisseria gonorrhoeae by PCR      Diagnosis or treatment significantly limited by social determinants of health - chronic mental health conditions, gender identity put pt at risk for healthcare bias and poor outcomes  23 minutes spent by me on the date of the encounter doing chart review, history and exam, documentation and further activities per the note     BMI:   Estimated body mass index is 33.61 kg/m  as calculated from the following:    Height as of this encounter: 1.651 m (5' 5\").    Weight as of this encounter: 91.6 kg (202 lb).   Weight management plan: deferred due to presenting concerns    Depression Screening Follow Up        5/25/2023     8:57 AM   PHQ   PHQ-9 Total Score 14    14   Q9: Thoughts of better off dead/self-harm past 2 weeks More than half the days    More than half the days   F/U: Thoughts of suicide or self-harm Yes    Yes   F/U: Self harm-plan No    No   F/U: Self-harm action No    No   F/U: Safety concerns No    No         5/25/2023     8:57 AM   Last PHQ-9   1.  Little interest or pleasure in doing things 3    3   2.  Feeling down, depressed, or hopeless 2    2   3.  Trouble falling or staying asleep, or sleeping too much 1    1   4.  Feeling tired or having little energy 3    3   5.  Poor appetite or overeating 1    1   6.  Feeling bad about yourself 2    2   7.  Trouble concentrating 0    0   8.  Moving slowly or restless 0    0   Q9: Thoughts of better off dead/self-harm past 2 " "weeks 2    2   PHQ-9 Total Score 14    14   In the past two weeks have you had thoughts of suicide or self harm? Yes    Yes   Do you have concerns about your personal safety or the safety of others? No    No   In the past 2 weeks have you thought about a plan or had intention to harm yourself? No    No   In the past 2 weeks have you acted on these thoughts in any way? No    No       Follow Up      Follow Up Actions Taken  close mental health follow up already in place      Return in about 1 month (around 6/11/2023).    Ilene Manzo [facility] LOLI Godwin is a 28 year old, presenting for the following health issues:  Depression        5/11/2023     3:48 PM   Additional Questions   Roomed by glenn   Accompanied by self     HPI     \"It/s okay. It's manageable I guess but not much more than that.\"     Had a tough weekend.   Things are going in a good direction. Called for the dual assessment consultation yesterday. Has an assessment next Wednesday.     Dad is [place] - deacon in [employer]. Had a triggering event with dad and grandma on th ephone recently.      Really likes job. Works w/ good people generally, supportive environment. Chiller in the summer.   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        Objective    /87   Pulse 111   Temp 97.8  F (36.6  C)   Resp 18   Ht 1.651 m (5' 5\")   Wt 91.6 kg (202 lb)   SpO2 100%   BMI 33.61 kg/m    Body mass index is 33.61 kg/m .  Physical Exam   GENERAL: alert, cooperative, in no acute distress  HEENT: sclera clear  PULM: normal respiratory effort   NEURO: alert and oriented, grossly intact, moves all extremities, normal gait   SKIN: no rashes or lesions visualized   PSYCH: restricted affect            "

## 2023-05-11 NOTE — PATIENT INSTRUCTIONS
[facility]  Lab and Imaging Center  Floor 1  [address] [place] [address]  Hours:   Monday-Friday: 6:30AM - 5:00PM  Saturday: 7:00AM - 12:00PM  Appointment needed  Phone: [phone]    [facility]  Outpatient Lab  [facility], First Floor  [address] [place] [address]  Hours:   Monday - Friday 7:30a-5:30p  No appointment needed - Walk-Ins available  **For adult patients only

## 2023-05-15 ENCOUNTER — OFFICE VISIT (OUTPATIENT)
Dept: PSYCHOLOGY | Facility: CLINIC | Age: 28
End: 2023-05-15
Payer: COMMERCIAL

## 2023-05-15 DIAGNOSIS — F33.1 MAJOR DEPRESSIVE DISORDER, RECURRENT, MODERATE (H): Primary | ICD-10-CM

## 2023-05-15 PROCEDURE — 90834 PSYTX W PT 45 MINUTES: CPT | Performed by: PSYCHOLOGIST

## 2023-05-15 NOTE — PROGRESS NOTES
Behavioral Health Progress Note    Client's Legal Name: Tato Adler    Client's Preferred Name: Tato  YOB: 1995  Type of Service: video, counseling   Length of Service:   Start time: 8:20AM   End time: 9:05AM   Duration:  45 minutes  Attendees: patient    Identifying Information and Presenting Problem:  Tato is a 28 year old adult being seen for problematic symptoms of depression. Additionally, he has identified an increased pattern of alcohol use in the past 2 years and expresses concern about this pattern.     Treatment Objective(s) Addressed in This Session:  Depressed Mood: Decrease frequency and intensity of feeling down, depressed, hopeless    Progress on / Status of Treatment Objective(s) / Homework:  Satisfactory Progress      Mental Health Screening Questionnaires:  PHQ-9:       3/6/2023     4:10 PM 4/10/2023     4:18 PM 5/11/2023     3:50 PM   PHQ   PHQ-9 Total Score 9 10 11   Q9: Thoughts of better off dead/self-harm past 2 weeks Several days Several days Several days      ROWDY-7:       1/31/2023    10:37 AM 2/28/2023     3:49 PM 3/6/2023     4:10 PM   ROWDY-7 SCORE   Total Score 1 4 3       Topics Discussed/Interventions Provided:    Engaged in relational processing therapy, identifying emotions and sense of connection with family and friends as well as barriers to connection. Explored what feels supportive, including time and presence from mom to listen and understand. Praised Tato for help seeking behaviors and focused on increasing sense of hope.      Assessment:   The patient appeared to be active and engaged in today's session and was receptive to feedback.     Mental Status:   During interaction with the examiner today, Tato was cooperative, open, engaged, and tearful.Patient was generally alert and oriented to person, place, time, and situation. They were casually dressed, appropriately groomed and appeared stated age. Patient's attire was appropriate for the  weather and occasion. Eye contact was good. Psychomotor functioning: normal or unremarkable. Speech was normal limits tone, rate, volume; largely coherent and relevant to topic. Mood was depressed; affect was mood congruent. Thought processes were unremarkable. Thought content was notable for passive suicidal ideation, without any intent, urges, or planning, and contained no evidence of psychosis.  Memory appeared grossly intact without being formally evaluated. Insight: good. Judgment was good. Patient exhibited good impulse control during the appointment.    Does the patient appear to be at imminent risk of harm to self/others at this time? No    The session was necessary to address symptoms of trauma and low mood that have been interfering with patient's ability to function in areas related to interacting and relating with others, maintaining personal health and physical well-being and participating in meaningful activities. Ongoing psychotherapy is necessary to provide counseling.    DSM-5 Diagnosis:  Major Depressive Disorder, Recurrent, Moderate   Other Stressor or Trauma-Related Disorder   R/o Alcohol Use Disorder     Plan:  1. Follow up with this provider on 6/5  2. Evaluation for dual diagnosis program with Yadira Lopez on 5/17   3. Check in call with this provider on 5/19 in afternoon   4. Follow up with Dr. Manzo on 5/30  5. [facility] appointment on 5/25  6.  After Visit Summary will be viewed in Bonny BLANCO, PhD, LP     NOTE: Treatment plan update due 7/31/23 session.  Diagnostic assessment update due 6/15/2023.

## 2023-05-16 ASSESSMENT — PATIENT HEALTH QUESTIONNAIRE - PHQ9
10. IF YOU CHECKED OFF ANY PROBLEMS, HOW DIFFICULT HAVE THESE PROBLEMS MADE IT FOR YOU TO DO YOUR WORK, TAKE CARE OF THINGS AT HOME, OR GET ALONG WITH OTHER PEOPLE: VERY DIFFICULT
SUM OF ALL RESPONSES TO PHQ QUESTIONS 1-9: 13
SUM OF ALL RESPONSES TO PHQ QUESTIONS 1-9: 13

## 2023-05-17 ENCOUNTER — HOSPITAL ENCOUNTER (OUTPATIENT)
Dept: BEHAVIORAL HEALTH | Facility: CLINIC | Age: 28
Discharge: HOME | End: 2023-05-17
Attending: STUDENT IN AN ORGANIZED HEALTH CARE EDUCATION/TRAINING PROGRAM | Admitting: STUDENT IN AN ORGANIZED HEALTH CARE EDUCATION/TRAINING PROGRAM
Payer: COMMERCIAL

## 2023-05-17 DIAGNOSIS — F33.2 SEVERE EPISODE OF RECURRENT MAJOR DEPRESSIVE DISORDER, WITHOUT PSYCHOTIC FEATURES (H): ICD-10-CM

## 2023-05-17 PROCEDURE — 90791 PSYCH DIAGNOSTIC EVALUATION: CPT | Performed by: COUNSELOR

## 2023-05-17 ASSESSMENT — COLUMBIA-SUICIDE SEVERITY RATING SCALE - C-SSRS
6. HAVE YOU EVER DONE ANYTHING, STARTED TO DO ANYTHING, OR PREPARED TO DO ANYTHING TO END YOUR LIFE?: NO
1. IN THE PAST MONTH, HAVE YOU WISHED YOU WERE DEAD OR WISHED YOU COULD GO TO SLEEP AND NOT WAKE UP?: YES
2. HAVE YOU ACTUALLY HAD ANY THOUGHTS OF KILLING YOURSELF IN THE PAST MONTH?: YES
5. HAVE YOU STARTED TO WORK OUT OR WORKED OUT THE DETAILS OF HOW TO KILL YOURSELF? DO YOU INTEND TO CARRY OUT THIS PLAN?: NO
4. HAVE YOU HAD THESE THOUGHTS AND HAD SOME INTENTION OF ACTING ON THEM?: NO
3. HAVE YOU BEEN THINKING ABOUT HOW YOU MIGHT KILL YOURSELF?: NO

## 2023-05-17 NOTE — PROGRESS NOTES
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 / coordination of treatment will be initiated with the following  support professionals: None.      6.  Referrals:   The following referral(s) will be initiated: DDP. Next Scheduled Appointment: TBD.      A Release of Information has been obtained for the following: None.     Emergency Contact Jessica Adler (mother) [phone] was obtained.      Clinical Substantiation/medical necessity for the above recommendations:      Summary: Patient is a 28 year old transgender male with a history of  Depression and alcohol use. The patient's acute suicide risk was determined to be low due to the following factors: suicidal thoughts. Patient denies experiencing command hallucinations, and has no immediate access to firearms. The patient's acute risk could be higher if noncompliant with their treatment plan, medications, follow-up appointments or using illicit substances or alcohol. Protective factors include: forward or future oriented thinking; dedication to family or friends; help seeking behaviors when distressed; daily obligations; healthy fear of risky behaviors or pain      Placement/Program/Barriers Identified:  "none    Referral: DDP    7. BOY:    BOY:  Discussed the general effects of drugs and alcohol on health and well-being.  Recommendations:  Abstain from use.     8. Records:   These were reviewed at time of assessment.   Information in this assessment was obtained from the medical record and  provided by patient who is a good historian.    Patient will have open access to their mental health medical record.    9.   Interactive Complexity: No      Provider Name/ Credentials:  Yadira Lopez, [facility]    May 17, 2023              Outpatient Mental Health Services - Adult    MY COPING PLAN FOR SAFETY        Name: Tato Adler  YOB: 1995  Date: May 17, 2023   My primary care provider: Ilene Manzo  My primary care clinic:   My prescriber:   Other care team support:     My Triggers:  the evening and being alone at night     Additional People, Places, and Things that I can access for support: my friend         What is important to me and makes life worth living: \"Knowing that it will get better\" .         GREEN    Good Control  1. I feel good  2. No suicidal thoughts   3. Can work, sleep and play      Action Steps  1. Self-care: balanced meals, exercising, sleep practices, etc.  2. Take your medications as prescribed.  3. Continue meetings with therapist and prescriber.  4.  Do the healthy things that I enjoy.             YELLOW  Getting Worse  I have ANY of these:  1. I do not feel good  2. Difficulty Concentrating  3. Sleep is changing  4. Increase/Change in my thoughts to hurt self and/or others, but I can still manage and not act on it.   5. Not taking care of self.               Action Steps (in addition to the above):  1. Inform your therapist and psychiatric prescriber/PCP.  2. Keep taking your medications as prescribed.    3. Turn to people you can ask for help.  4. Use internal coping strategies -see below.  5. Create safe environment: notify friends/family of increase in symptoms " and reduce means to other identified method             RED  Get Help  If I have ANY of these:  1. Current and uncontrollable thoughts and/or behaviors to hurt self and/or others.      Actions to manage my safety  1. Contact your emergency person Jessica Adler (mother) [phone]  2. Call my crisis team- [facility] [phone] [facility] Outreach for Psych Emergencies  3. Or Call 911 or go to the emergency room right away          My Internal Coping Strategies include the following:  arts and crafts, fidget toys, exercise and use my coping skills    [Safety Plan sent via [employer]]     Safety Concerns  How To Identify Situations That Make Your Mental Health Worse:  Triggers are things that make your mental health worse.  Look at the list below to help you find your triggers and what you can do about them.     1. Identify Early Warning Signs:    Sometimes symptoms return, even when people do their best to stay well. Symptoms can develop over a short period of time with little or no warning, but most of the time they emerge gradually over several weeks.  Early warning signs are changes that people experience when a relapse is starting. Some early warning signs are common and others are not as common.   Common Early Warning Signs:    Feeling tense or nervous, Eating less or eating more, Trouble sleeping -either too much or too little sleep, Feeling depressed or low, Feeling irritable, Feeling like not being around other people and Trouble concentrating     2. Identify action steps to take when warning signs are noticed:    Taking Action- It is important to take action if you are experiencing early warning signs of a relapse.  The faster you act, the more likely it is that you can avoid a full relapse.  It is helpful to identify several specific ways to cope with symptoms.      The following is my list of symptoms and coping strategies that I can use when they are present:    Symptom Coping Strategies    Anxiety -Talk with someone in your support system and let him or her know how you are feeling.  -Use relaxation techniques such as deep breathing or imagery.  -Use positive affirmations to counteract negative self-talk such as  I am learning to let go of worry.    Depression - Schedule your day; include activities you have to do and activities you enjoy doing.  - Get some exercise - walk, run, bike, or swim.  - Give yourself credit for even the smallest things you get done.   Sleep Difficulties   - Go to sleep at the same time every day.  - Do something relaxing before bed, such as drinking herbal tea or listening to music.  - Avoid having discussions about upsetting topics before going to bed.   Delusions   - Distract yourself from the disturbing thought by doing something that requires your attention such as a puzzle.  - Check out your beliefs by talking to someone you trust.    Hallucinations   - Use headphones to listen to music.  - Tell voices to  stop  or say to yourself,  I am safe.   - Ignore the hallucinations as much as possible; focus on other things.   Concentration Difficulties - Minimize distractions so there is only one thing for you to focus on at a time.    - Ask the person you are having a conversation with to slow down or repeat things you are unsure of.

## 2023-05-17 NOTE — PATIENT INSTRUCTIONS
Welcome to [facility] Adult Mental Health Outpatient Programs    Thank you for choosing [facility]!    Congratulations! You have completed the first step in your recovery by participating in a Diagnostic Assessment. With your input, [facility]ira [facility], LADC, is recommending the following level of care and services to best meet your needs.    Managing mental health symptoms while balancing life stressors can be difficult. Our mental health programming will provide the group therapy, education, skills, and support needed to improve your well-being while living a healthy and manageable lifestyle.    All our Adult Mental Health Outpatient Programs are group-based and allow you to meet with peers who are trying to manage similar symptoms and/or life circumstances in a safe and therapeutic setting.    Recommendations and Plan:     Level of Care:  Dual Disorder Program Intensive Outpatient   (DDP-IOP) [phone]    Start Date:  TBD    Schedule:  Monday, Tuesday and Thursday, Friday (No Wednesdays) @ 9 AM to 11:50 PM    If you were placed on a Wait List following your Diagnostic Assessment, please expect the following:  You will receive a phone call from the program within a few days to discuss a start date and plan.    Please contact the program at the number listed above if you are choosing to be removed from the Wait List, need to reschedule your start or if you have any additional questions.      Type of Participation:  In-Person    You have chosen to participate in our IN-PERSON option for treatment.     We are located at the [facility] in the [facility] at: [address] [place] [address].  Please use the above address when scheduling transportation.   Parking is available in the  Yellow  ramp located across from the [facility] entrance. You will receive a parking voucher upon arrival that discounts your cost to $2 per day.   When you  arrive, you will be greeted at the [facility] Entrance by a team member. They will ask you the following screening questions. If you answer yes to any of the following questions, you will not be allowed to enter the [facility]. If you know you will not be able to participate in group, please contact the program directly to inform them of your absence. The program will follow current expectations for re-entry into the program once you are medically cleared.   After the screening at the [facility] Entrance, you will check-in at the  in NG-14.   Patients will need to use available hand  before and after entering the group room.   Masks are required for all patients and staff members. Masks will be provided.    To provide the best group experience for everyone, we expect confidentiality, regular attendance, and respectful participation by all.        What is said in group, stays in group. (All personal or identifying information shared in group should be kept confidential and not shared with anyone).    NOTE: Audio or Visual recording are not allowed and may result in immediate discharge from the program.      Please be sitting upright in a comfortable position. This will maximize engagement and participation for everyone.    Please refrain from smoking, eating, or engaging in other distracting activities during groups.  Smoking is allowed outside on the street side walk (not in the drop-off area or near the building).    Facilitators may provide reminders of the above expectations during group as needed.    Please do NOT cancel your appointments through [facility].  If you are going to be absent, please contact the program number and leave a message so staff will not expect you.  You will NOT be billed for any sessions you do not attend.  See additional attendance and program participation information below.       Dual Disorder Intensive Outpatient Program Overview ([facility]) [phone]    This  level of care is less intensive than an inpatient or partial hospitalization program and provides more support than traditional individual psychotherapy. Mental health and substance use concerns will be addressed together for a successful treatment experience.    Length of Stay Typically, patients attend 6-8 weeks of programming, although this can vary depending on specific patient needs.   Treatment team Licensed mental health professionals including psychotherapists, a psychiatrist and/or psychologist and a Licensed Alcohol and Drug Counselor.      Group-based programming 3 groups per day; 4 days per week  50 minutes per group  10-minute break between each group  Facilitated by a member of the treatment team    Group Psychotherapy is provided daily, allowing time to check-in, process concerns and share feedback/support. All other groups include a topic and provide opportunities for education, skill building, discussion, and support.      Example Group Topics Admission IOP topics are listed above and will align with additional group topics covered throughout the 12-week combined programming.     Topics may include:      Behavior Activation, Cognitive Restructuring: Cognitive Distortions, Communication Skills/ Areas for Self-Improvement. Coping Skills, Discharge Planning, Dimensions of Wellness, Emotions, Forgiveness, Functional Nutrition, Grief, Life Transitions, Leisure Exploration, Life Skills, Listening for Meaning, Medication Assessment, Mental Health Social Support, Mindfulness, Mood Tracking/Triggers, Motivation and Procrastination, Relationship, Relaxation Techniques, Self-Awareness, Self-Confidence, Self-Care, Self-Compassion, Self-Esteem and Self-compassion, Shame and Guilt, Sleep hygiene, SMART Goals, Stages to [place] with Stress, Stigma, Strategies to Improve Motivation, Symptom Awareness, Time Management, Trauma Triggers, Values, Wellness                   Psychiatrist and/or Psychologist              Patients will see the program psychiatrist and/or a psychologist for and initial visit and follow-up every 30 days.   If seeing the psychiatrist, they will partner with you and your other providers for medication management, as needed.   Psychiatry services will be billed separately.   For insurance purposes, please coordinate with team to prevent scheduling to see the program psychiatrist on the same day you see your outpatient psychiatrist  If seeing the psychologist, they will provide individual therapy. Medication management will not be provided.     NOTE: Either provider will continue to assess your progress and coordinate with the treatment team to determine when you may be ready for completion.  This is a program requirement.      Individual Therapy See psychologist services above.    Physical Health Screen You will meet with a program nurse within the first week to complete a brief physical health screen. This is a program requirement.     FMLA or Short-term Disability We encourage you to request completion of paperwork from your long-term providers.   If this is not an option, please notify the program nurse as soon as possible.  We will do our best to help you coordinate completion of paperwork.   Medical Record requests: please contact Release of Information  at [phone] and allow up to 14 days for records once the authorization for release is received.    Treatment and Discharge Planning Patients meet individually with team members for treatment planning.     We will help you develop goals and identify strengths and/or barriers.   We will discuss your program participation, progress, and discharge planning.   We are available to assist with referrals and service coordination; please let us know how best we can support your specific needs.   You will receive copies of your treatment plan and discharge summary via [employer].          An Important Note from your Program Treatment Team...    Welcome! We  are happy to be partnering with you on your recovery journey. Our experienced clinicians have developed programming based on current research and evidence-based practice to provide you with high quality mental health treatment.     Attendance and Program Participation:  You will participate in a variety of groups each day. Our goal is to provide you with a rich and varied therapeutic healing environment knowing patients have unique experiences and preferred ways of sharing, learning, processing, and engaging in the recovery process.  You are expected to attend all groups on time.  If you are going to be late or absent, please let a team member know the reason. You can also leave that same information at the number listed above.  In the event of an unreported absence, we will reach out to you. If we are unable to reach you, know that we may call your emergency contact.  We always attempt to establish contact with your emergency contact prior to initiating a wellness check.  While it is important that you continue to attend appointments with your individual therapist, psychiatrist, and other medical providers, you are encouraged to schedule these appointments outside of group hours whenever possible.  If your attendance becomes a concern, your treatment team will have a discussion with you and may start an Attendance Agreement. Following your Attendance Agreement is required to prevent early discharge from the program.  To get the most out of the program and to support your wellbeing we require that you do not use any chemicals, tobacco or vape products on screen or during program hours. The team is available to assist you if this is something you struggle with.  Please be mindful that you are part of a group; therefore, we ask that you be respectful of other group members' needs and do not use derogatory, offensive, or discriminatory language.  You will be removed from group if suspected of being under the influence  of substances or if using language that negatively impacts the group.  Your treatment team will address any concerns with you and offer recommendations. A Behavior Agreement may be started. Following your Behavior Agreement is required to prevent early discharge from the program.  Communication with other group members outside of group is discouraged. This can affect your treatment and the way the group functions.  If you choose to share contact information with group peers AFTER you are discharged from the program, this decision is completely independent of any program coordination or support.  While in the program, participants may not engage in any sexual or intimate relationships with each other outside of group. This may result in immediate discharge of both participants from the program.     Trauma:  Many of our patients have experienced trauma. You are not alone. This can be challenging for patients to manage. All our team members have been trained in Trauma Informed Care and will provide you with the education, skills training, and support that you need to stabilize your symptoms.  Specific details and descriptions of abuse, assault, violence, neglect, etc., are best processed in individual therapy as to avoid triggering other group members.  Discussing how traumatic experiences have impacted beliefs about self/others/world and practicing skills to cope with symptoms is very appropriate for group therapy.     We look forward to working together to support your mental health.     We love feedback from our patients about our program!  Please take a few moments to respond to surveys sent out by [employer].  This will help us continue to make improvements and to keep the things that are   important to you!  MY COPING PLAN FOR SAFETY        Name: Tato Adler  YOB: 1995  Date: May 17, 2023   My primary care provider: Ilene Manzo  My primary care clinic:   My prescriber:  "  Other care team support:     My Triggers:  the evening and being alone at night     Additional People, Places, and Things that I can access for support: my friend         What is important to me and makes life worth living: \"Knowing that it will get better\" .         GREEN    Good Control  1. I feel good  2. No suicidal thoughts   3. Can work, sleep and play      Action Steps  1. Self-care: balanced meals, exercising, sleep practices, etc.  2. Take your medications as prescribed.  3. Continue meetings with therapist and prescriber.  4.  Do the healthy things that I enjoy.             YELLOW  Getting Worse  I have ANY of these:  1. I do not feel good  2. Difficulty Concentrating  3. Sleep is changing  4. Increase/Change in my thoughts to hurt self and/or others, but I can still manage and not act on it.   5. Not taking care of self.               Action Steps (in addition to the above):  1. Inform your therapist and psychiatric prescriber/PCP.  2. Keep taking your medications as prescribed.    3. Turn to people you can ask for help.  4. Use internal coping strategies -see below.  5. Create safe environment: notify friends/family of increase in symptoms and reduce means to other identified method             RED  Get Help  If I have ANY of these:  1. Current and uncontrollable thoughts and/or behaviors to hurt self and/or others.      Actions to manage my safety  1. Contact your emergency person Jessica Adler (mother) [phone]  2. Call my crisis team- [facility] [phone] Community Outreach for Psych Emergencies  3. Or Call 911 or go to the emergency room right away          My Internal Coping Strategies include the following:  arts and crafts, fidget toys, exercise and use my coping skills    [Safety Plan sent via [employer]]     Safety Concerns  How To Identify Situations That Make Your Mental Health Worse:  Triggers are things that make your mental health worse.  Look at the list below to help you " find your triggers and what you can do about them.     1. Identify Early Warning Signs:    Sometimes symptoms return, even when people do their best to stay well. Symptoms can develop over a short period of time with little or no warning, but most of the time they emerge gradually over several weeks.  Early warning signs are changes that people experience when a relapse is starting. Some early warning signs are common and others are not as common.   Common Early Warning Signs:    Feeling tense or nervous, Eating less or eating more, Trouble sleeping -either too much or too little sleep, Feeling depressed or low, Feeling irritable, Feeling like not being around other people and Trouble concentrating     2. Identify action steps to take when warning signs are noticed:    Taking Action- It is important to take action if you are experiencing early warning signs of a relapse.  The faster you act, the more likely it is that you can avoid a full relapse.  It is helpful to identify several specific ways to cope with symptoms.      The following is my list of symptoms and coping strategies that I can use when they are present:    Symptom Coping Strategies   Anxiety -Talk with someone in your support system and let him or her know how you are feeling.  -Use relaxation techniques such as deep breathing or imagery.  -Use positive affirmations to counteract negative self-talk such as  I am learning to let go of worry.    Depression - Schedule your day; include activities you have to do and activities you enjoy doing.  - Get some exercise - walk, run, bike, or swim.  - Give yourself credit for even the smallest things you get done.   Sleep Difficulties   - Go to sleep at the same time every day.  - Do something relaxing before bed, such as drinking herbal tea or listening to music.  - Avoid having discussions about upsetting topics before going to bed.   Delusions   - Distract yourself from the disturbing thought by doing  something that requires your attention such as a puzzle.  - Check out your beliefs by talking to someone you trust.    Hallucinations   - Use headphones to listen to music.  - Tell voices to  stop  or say to yourself,  I am safe.   - Ignore the hallucinations as much as possible; focus on other things.   Concentration Difficulties - Minimize distractions so there is only one thing for you to focus on at a time.    - Ask the person you are having a conversation with to slow down or repeat things you are unsure of.

## 2023-05-22 ENCOUNTER — TELEPHONE (OUTPATIENT)
Dept: PSYCHOLOGY | Facility: CLINIC | Age: 28
End: 2023-05-22

## 2023-05-22 NOTE — TELEPHONE ENCOUNTER
Placed check in call to Tato, as planned, to see how visit is going. Reports feeling really good, following fun wedding with friends. Will be spending more time with family this evening. Encouraged him to take breaks as needed. Briefly discussed intake for dual dx program he had on 5/17. Shared he is on wait list and hasn't had much time to process but plans to when he returns from trip.       Plan:     [facility] appointment on 5/25     Follow up with Dr. Manzo on 5/30     Follow up with this provider on 6/5    Reminded patient of provider's absence 5/23 - 5/30 but encouraged to reach out to clinic as needed.     Allan Caruso, PhD, LP   she/her/hers  Primary Care Behavioral Health Fellow

## 2023-05-25 ENCOUNTER — VIRTUAL VISIT (OUTPATIENT)
Dept: PSYCHIATRY | Facility: CLINIC | Age: 28
End: 2023-05-25
Attending: PSYCHOLOGIST
Payer: COMMERCIAL

## 2023-05-25 ENCOUNTER — VIRTUAL VISIT (OUTPATIENT)
Dept: BEHAVIORAL HEALTH | Facility: CLINIC | Age: 28
End: 2023-05-25
Attending: PSYCHOLOGIST
Payer: COMMERCIAL

## 2023-05-25 DIAGNOSIS — F33.1 MAJOR DEPRESSIVE DISORDER, RECURRENT, MODERATE (H): ICD-10-CM

## 2023-05-25 DIAGNOSIS — F33.1 MAJOR DEPRESSIVE DISORDER, RECURRENT, MODERATE (H): Primary | ICD-10-CM

## 2023-05-25 DIAGNOSIS — F10.10 ALCOHOL ABUSE: ICD-10-CM

## 2023-05-25 PROCEDURE — 90832 PSYTX W PT 30 MINUTES: CPT | Mod: VID | Performed by: SOCIAL WORKER

## 2023-05-25 PROCEDURE — 99205 OFFICE O/P NEW HI 60 MIN: CPT | Mod: VID | Performed by: STUDENT IN AN ORGANIZED HEALTH CARE EDUCATION/TRAINING PROGRAM

## 2023-05-25 RX ORDER — ARIPIPRAZOLE 2 MG/1
2 TABLET ORAL DAILY
Qty: 30 TABLET | Refills: 0 | Status: SHIPPED | OUTPATIENT
Start: 2023-05-25 | End: 2023-06-13

## 2023-05-25 ASSESSMENT — PATIENT HEALTH QUESTIONNAIRE - PHQ9
10. IF YOU CHECKED OFF ANY PROBLEMS, HOW DIFFICULT HAVE THESE PROBLEMS MADE IT FOR YOU TO DO YOUR WORK, TAKE CARE OF THINGS AT HOME, OR GET ALONG WITH OTHER PEOPLE: SOMEWHAT DIFFICULT
SUM OF ALL RESPONSES TO PHQ QUESTIONS 1-9: 14
10. IF YOU CHECKED OFF ANY PROBLEMS, HOW DIFFICULT HAVE THESE PROBLEMS MADE IT FOR YOU TO DO YOUR WORK, TAKE CARE OF THINGS AT HOME, OR GET ALONG WITH OTHER PEOPLE: SOMEWHAT DIFFICULT
SUM OF ALL RESPONSES TO PHQ QUESTIONS 1-9: 14
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
## 2023-05-25 NOTE — PATIENT INSTRUCTIONS
Eliel Godwin,    Thank you for our time together today in Collaborative Care Psychiatry Service (CCPS). CCPS provides brief psychiatric medication stabilization to patients referred by their Primary Care Providers. Patients are typically seen in CCPS for up to 4 appointments and then referred back to the PCP for ongoing refills unless longer term medication management by a specialist is indicated. If I believe you will benefit from long-term psychiatric care I will discuss this with you. If you are interested in seeing a psychiatrist or psychiatric nurse practitioner long-term, please send me a message in Sn[facility] so we can refer you appropriately.     Treatment Plan:  -continue BUPROPION XL 300mg daily  -start ARIPIPRAZOLE 2mg daily  Start trying to cut out one drink per night for a max of 5 drinks/night due to impact of alcohol on medicine effectiveness and risks of alcohol + bupropion.  Follow up in 3 weeks or sooner if needed.  You can call [phone] to make appointments, leave a message with our nursing team, and inquire about any mental health referrals I have placed.  Please call your pharmacy to request a refill of your medicines listed above if needed between appointments.     MARILYN Noland  [facility] Psychiatry Service  [facility]   Patient Education   [facility]  What to Expect  Here's what to expect from your Collaborative Care Psychiatry Service (CCPS).   About CCPS  CCPS means 2 people work together to help you get better. You'll meet with a behavioral health clinician and a psychiatric doctor. A behavioral health clinician helps people with mental health problems by talking with them. A psychiatric doctor helps people by giving them medicine.  How it works  At every visit, you'll see the behavioral health clinician (BHC) first. They'll talk with you about how you're doing and teach you how to feel better.   Then you'll see the psychiatric doctor. This  "doctor can help you deal with troubling thoughts and feelings by giving you medicine. They'll make sure you know the plan for your care.   CCPS usually takes 3 to 6 visits. If you need more visits, we may have you start seeing a different psychiatric doctor for ongoing care.  If you have any questions or concerns, we'll be glad to talk with you.  About visits  Be open  At your visits, please talk openly about your problems. It may feel hard, but it's the best way for us to help you.  Cancelling visits  If you can't come to your visit, please call us right away at [phone]. If you don't cancel at least 24 hours (1 full day) before your visit, that's \"late cancellation.\"  Being late to visits  Being very late is the same as not showing up. You will be a \"no show\" if:  Your appointment starts with a C, and you're more than 15 minutes late for a 30-minute (half hour) visit. This will also cancel your appointment with the psychiatric doctor.  Your appointment is with a psychiatric doctor only, and you're more than 15 minutes late for a 30-minute (half hour) visit.  Your appointment is with a psychiatric doctor only, and you're more than 30 minutes late for a 60-minute (full hour) visit.  If you cancel late or don't show up 2 times within 6 months, we may end your care.   Getting help between visits  If you need help between visits, you can call us Monday to Friday from 8 a.m. to 4:30 p.m. at [phone].  Emergency care  Call 911 or go to the nearest emergency department if your life or someone else's life is in danger.  Call 988 anytime to reach the national Suicide and Crisis hotline.  Medicine refills  To refill your medicine, call your pharmacy. You can also call [facility]'s Behavioral Access at [phone], Monday to Friday, 8 a.m. to 4:30 p.m. It can take 1 to 3 business days to get a refill.   Forms, letters, and tests  You may have papers to fill out, like FMLA, short-term disability, and " workability. We can help you with these forms at your visits, but you must have an appointment. You may need more than 1 visit for this, to be in an intensive therapy program, or both.  Before we can give you medicine for ADHD, we may refer you to get tested for it or confirm it another way.  We may not be able to give you an emotional support animal letter.  We don't do mental health checks ordered by the court.   We don't do mental health testing, but we can refer you to get tested.   Thank you for choosing us for your care.  For informational purposes only. Not to replace the advice of your health care provider. Copyright   2022 [facility]. All rights reserved. Carnad [id number] - 12/22.

## 2023-05-25 NOTE — NURSING NOTE
Is the patient currently in the state of [place]? YES    Visit mode:VIDEO    If the visit is dropped, the patient can be reconnected by: VIDEO VISIT: Send to e-mail at: [email or web address]    Will anyone else be joining the visit? NO      How would you like to obtain your AVS? MyChart    Are changes needed to the allergy or medication list? NO    Reason for visit: Consult      Care team has reviewed attendance agreement with patient. Patient advised that two failed appointments within 6 months may lead to termination of current episode of care.      Chelsea Putnam VF
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
## 2023-06-01 ENCOUNTER — LAB (OUTPATIENT)
Dept: LAB | Facility: CLINIC | Age: 28
End: 2023-06-01
Payer: COMMERCIAL

## 2023-06-01 ENCOUNTER — TELEPHONE (OUTPATIENT)
Dept: BEHAVIORAL HEALTH | Facility: CLINIC | Age: 28
End: 2023-06-01
Payer: COMMERCIAL

## 2023-06-01 DIAGNOSIS — F64.9 GENDER DYSPHORIA: ICD-10-CM

## 2023-06-01 DIAGNOSIS — F33.1 MAJOR DEPRESSIVE DISORDER, RECURRENT, MODERATE (H): ICD-10-CM

## 2023-06-01 LAB
ALBUMIN SERPL BCG-MCNC: 4.8 G/DL (ref 3.5–5.2)
ALP SERPL-CCNC: 77 U/L (ref 35–129)
ALT SERPL W P-5'-P-CCNC: 32 U/L (ref 10–50)
ANION GAP SERPL CALCULATED.3IONS-SCNC: 11 MMOL/L (ref 7–15)
AST SERPL W P-5'-P-CCNC: 29 U/L (ref 10–50)
BILIRUB SERPL-MCNC: 1.3 MG/DL
BUN SERPL-MCNC: 12.4 MG/DL (ref 6–20)
CALCIUM SERPL-MCNC: 10.2 MG/DL (ref 8.6–10)
CHLORIDE SERPL-SCNC: 102 MMOL/L (ref 98–107)
CREAT SERPL-MCNC: 0.94 MG/DL (ref 0.51–1.17)
DEPRECATED CALCIDIOL+CALCIFEROL SERPL-MC: 21 UG/L (ref 20–75)
DEPRECATED HCO3 PLAS-SCNC: 25 MMOL/L (ref 22–29)
GFR SERPL CREATININE-BSD FRML MDRD: ABNORMAL ML/MIN/{1.73_M2}
GLUCOSE SERPL-MCNC: 96 MG/DL (ref 70–99)
HGB BLD-MCNC: 16.8 G/DL (ref 11.7–17.7)
POTASSIUM SERPL-SCNC: 4.2 MMOL/L (ref 3.4–5.3)
PROT SERPL-MCNC: 7.7 G/DL (ref 6.4–8.3)
SODIUM SERPL-SCNC: 138 MMOL/L (ref 136–145)
TSH SERPL DL<=0.005 MIU/L-ACNC: 1.53 UIU/ML (ref 0.3–4.2)

## 2023-06-01 PROCEDURE — 85018 HEMOGLOBIN: CPT

## 2023-06-01 PROCEDURE — 87591 N.GONORRHOEAE DNA AMP PROB: CPT | Performed by: STUDENT IN AN ORGANIZED HEALTH CARE EDUCATION/TRAINING PROGRAM

## 2023-06-01 PROCEDURE — 82306 VITAMIN D 25 HYDROXY: CPT

## 2023-06-01 PROCEDURE — 84443 ASSAY THYROID STIM HORMONE: CPT

## 2023-06-01 PROCEDURE — 82374 ASSAY BLOOD CARBON DIOXIDE: CPT

## 2023-06-01 PROCEDURE — 82310 ASSAY OF CALCIUM: CPT

## 2023-06-01 PROCEDURE — 84403 ASSAY OF TOTAL TESTOSTERONE: CPT

## 2023-06-01 PROCEDURE — 87491 CHLMYD TRACH DNA AMP PROBE: CPT | Performed by: STUDENT IN AN ORGANIZED HEALTH CARE EDUCATION/TRAINING PROGRAM

## 2023-06-01 PROCEDURE — 36415 COLL VENOUS BLD VENIPUNCTURE: CPT

## 2023-06-01 NOTE — TELEPHONE ENCOUNTER
LVM for pt re: DDP at [facility] and if pt was still interested in the program as spots will be opening soon.  Provided writer's direct call back info.

## 2023-06-02 LAB
C TRACH DNA SPEC QL PROBE+SIG AMP: NEGATIVE
N GONORRHOEA DNA SPEC QL NAA+PROBE: NEGATIVE

## 2023-06-04 ENCOUNTER — HEALTH MAINTENANCE LETTER (OUTPATIENT)
Age: 28
End: 2023-06-04

## 2023-06-04 LAB — TESTOST SERPL-MCNC: 977 NG/DL (ref 8–950)

## 2023-06-05 ENCOUNTER — OFFICE VISIT (OUTPATIENT)
Dept: PSYCHOLOGY | Facility: CLINIC | Age: 28
End: 2023-06-05
Payer: COMMERCIAL

## 2023-06-05 DIAGNOSIS — F33.1 MAJOR DEPRESSIVE DISORDER, RECURRENT, MODERATE (H): Primary | ICD-10-CM

## 2023-06-05 PROCEDURE — 90834 PSYTX W PT 45 MINUTES: CPT | Mod: 95 | Performed by: PSYCHOLOGIST

## 2023-06-05 ASSESSMENT — ANXIETY QUESTIONNAIRES
5. BEING SO RESTLESS THAT IT IS HARD TO SIT STILL: NOT AT ALL
7. FEELING AFRAID AS IF SOMETHING AWFUL MIGHT HAPPEN: NOT AT ALL
1. FEELING NERVOUS, ANXIOUS, OR ON EDGE: SEVERAL DAYS
2. NOT BEING ABLE TO STOP OR CONTROL WORRYING: NOT AT ALL
IF YOU CHECKED OFF ANY PROBLEMS ON THIS QUESTIONNAIRE, HOW DIFFICULT HAVE THESE PROBLEMS MADE IT FOR YOU TO DO YOUR WORK, TAKE CARE OF THINGS AT HOME, OR GET ALONG WITH OTHER PEOPLE: VERY DIFFICULT
GAD7 TOTAL SCORE: 2
6. BECOMING EASILY ANNOYED OR IRRITABLE: SEVERAL DAYS
3. WORRYING TOO MUCH ABOUT DIFFERENT THINGS: NOT AT ALL
GAD7 TOTAL SCORE: 2

## 2023-06-05 ASSESSMENT — PATIENT HEALTH QUESTIONNAIRE - PHQ9
5. POOR APPETITE OR OVEREATING: NOT AT ALL
SUM OF ALL RESPONSES TO PHQ QUESTIONS 1-9: 14

## 2023-06-05 NOTE — PROGRESS NOTES
Behavioral Health Progress Note    Client's Legal Name: Tato Adler    Client's Preferred Name: Tato  YOB: 1995  Type of Service: video, counseling   Length of Service:   Start time: 8:25AM   End time: 9:05AM   Duration:  40 minutes  Attendees: patient    Identifying Information and Presenting Problem:  Tato is a 28 year old adult being seen for problematic symptoms of depression. Additionally, he has identified an increased pattern of alcohol use in the past 2 years and expresses concern about this pattern.     Treatment Objective(s) Addressed in This Session:  Depressed Mood: Decrease frequency and intensity of feeling down, depressed, hopeless    Progress on / Status of Treatment Objective(s) / Homework:  Satisfactory Progress      Mental Health Screening Questionnaires:  PHQ-9:       5/23/2023     9:34 AM 5/25/2023     8:57 AM 6/5/2023     8:31 AM   PHQ   PHQ-9 Total Score 13 14    14 14   Q9: Thoughts of better off dead/self-harm past 2 weeks More than half the days More than half the days    More than half the days More than half the days   F/U: Thoughts of suicide or self-harm Yes Yes    Yes    F/U: Self harm-plan No No    No    F/U: Self-harm action No No    No    F/U: Safety concerns No No    No       ROWDY-7:       2/28/2023     3:49 PM 3/6/2023     4:10 PM 6/5/2023     8:31 AM   ROWDY-7 SCORE   Total Score 4 3 2       Topics Discussed/Interventions Provided:    Substance IOP: shared that he is on a waitlist for IOP at [facility]. He was on waitlist at [place] but evening times are better at [facility]. Processed fears of going to program. He realized that we began discussing [facility] IOP in September. Normalized nature of substance abuse and challenge of making change. Praised for all the steps he has taken and is taking.     Psychiatry: had CCPS visit and is feeling hopeful about [facility] addition. He reports feeling a bit different but cannot really describe in what way.      Mood: continues to experience persistent passive SI thoughts. Etoh has been go-to coping strategy when this happens. Explored plans for how to spend evenings, including attending [facility] and      Assessment:   The patient appeared to be active and engaged in today's session and was receptive to feedback.     Mental Status:   During interaction with the examiner today, Godwin was cooperative, open, engaged, and tearful.Patient was generally alert and oriented to person, place, time, and situation. They were casually dressed, appropriately groomed and appeared stated age. Patient's attire was appropriate for the weather and occasion. Eye contact was good. Psychomotor functioning: normal or unremarkable. Speech was normal limits tone, rate, volume; largely coherent and relevant to topic. Mood was depressed; affect was mood congruent. Thought processes were unremarkable. Thought content was notable for passive suicidal ideation, without any intent, urges, or planning, and contained no evidence of psychosis.  Memory appeared grossly intact without being formally evaluated. Insight: good. Judgment was good. Patient exhibited good impulse control during the appointment.    Does the patient appear to be at imminent risk of harm to self/others at this time? No    The session was necessary to address symptoms of trauma and low mood that have been interfering with patient's ability to function in areas related to interacting and relating with others, maintaining personal health and physical well-being and participating in meaningful activities. Ongoing psychotherapy is necessary to provide counseling.    DSM-5 Diagnosis:  Major Depressive Disorder, Recurrent, Moderate   Other Stressor or Trauma-Related Disorder   R/o Alcohol Use Disorder     Plan:  1. Follow up with this provider on 6/12   2. Follow up CCPS visit on 6/13  3. On waitlist for [facility]   4.  After Visit Summary will be viewed in [employer] NEWELL  BELLA, PhD, LP     NOTE: Treatment plan update due 7/31/23 session.  Diagnostic assessment update due 6/15/2023.

## 2023-06-06 ENCOUNTER — TELEPHONE (OUTPATIENT)
Dept: BEHAVIORAL HEALTH | Facility: CLINIC | Age: 28
End: 2023-06-06
Payer: COMMERCIAL

## 2023-06-06 NOTE — TELEPHONE ENCOUNTER
Pt left VM for writer, so writer returned call and spoke with pt re: DANIELA.  Pt is not able to attend the current programming hours of 9am-12noon, and will be looking for an afternoon or evening program in the community.  Pt also requested to be removed from the waiting list at this time.

## 2023-06-12 ENCOUNTER — OFFICE VISIT (OUTPATIENT)
Dept: PSYCHOLOGY | Facility: CLINIC | Age: 28
End: 2023-06-12
Payer: COMMERCIAL

## 2023-06-12 DIAGNOSIS — F33.1 MAJOR DEPRESSIVE DISORDER, RECURRENT, MODERATE (H): Primary | ICD-10-CM

## 2023-06-12 PROCEDURE — 90834 PSYTX W PT 45 MINUTES: CPT | Performed by: PSYCHOLOGIST

## 2023-06-12 NOTE — PROGRESS NOTES
Behavioral Health Progress Note    Client's Legal Name: Tato Adler    Client's Preferred Name: Tato  YOB: 1995  Type of Service: video, counseling   Length of Service:   Start time: 8:25AM   End time: 9:05AM   Duration:  40 minutes  Attendees: patient    Identifying Information and Presenting Problem:  Tato is a 28 year old adult being seen for problematic symptoms of depression. Additionally, he has identified an increased pattern of alcohol use in the past 2 years and expresses concern about this pattern.     Treatment Objective(s) Addressed in This Session:  Depressed Mood: Decrease frequency and intensity of feeling down, depressed, hopeless    Progress on / Status of Treatment Objective(s) / Homework:  Satisfactory Progress      Mental Health Screening Questionnaires:  PHQ-9:       5/23/2023     9:34 AM 5/25/2023     8:57 AM 6/5/2023     8:31 AM   PHQ   PHQ-9 Total Score 13 14    14 14   Q9: Thoughts of better off dead/self-harm past 2 weeks More than half the days More than half the days    More than half the days More than half the days   F/U: Thoughts of suicide or self-harm Yes Yes    Yes    F/U: Self harm-plan No No    No    F/U: Self-harm action No No    No    F/U: Safety concerns No No    No       ROWDY-7:       2/28/2023     3:49 PM 3/6/2023     4:10 PM 6/5/2023     8:31 AM   ROWDY-7 SCORE   Total Score 4 3 2       Topics Discussed/Interventions Provided:    Mood: reports a decrease in passive SI and a bit more motivation; speculates recent medication change may be contributing.   Etoh use: didn't drink a couple nights last week and noticed more disrupted sleep. He is still interested in IOP at [facility] and is on waitlist.   Tobacco/Vape use: shared that a friend/former co-worker passed away (age 28) due to an underlying condition but this has had him considering his health. He noticed some chest pain recently, which he associated with vaping. Engaged in behavior health  "change planning. Praised efforts thus far.     Assessment:   The patient appeared to be active and engaged in today's session and was receptive to feedback.     Mental Status:   During interaction with the examiner today, Godwin was cooperative, open, engaged, and tearful.Patient was generally alert and oriented to person, place, time, and situation. They were casually dressed, appropriately groomed and appeared stated age. Patient's attire was appropriate for the weather and occasion. Eye contact was good. Psychomotor functioning: normal or unremarkable. Speech was normal limits tone, rate, volume; largely coherent and relevant to topic. Mood was \"doing better\"; affect was slightly brighter than usual. Thought processes were unremarkable. Thought content was notable for passive suicidal ideation, without any intent, urges, or planning, and contained no evidence of psychosis.  Memory appeared grossly intact without being formally evaluated. Insight: good. Judgment was good. Patient exhibited good impulse control during the appointment.    Does the patient appear to be at imminent risk of harm to self/others at this time? No    The session was necessary to address symptoms of trauma and low mood that have been interfering with patient's ability to function in areas related to interacting and relating with others, maintaining personal health and physical well-being and participating in meaningful activities. Ongoing psychotherapy is necessary to provide counseling.    DSM-5 Diagnosis:  Major Depressive Disorder, Recurrent, Moderate   Other Stressor or Trauma-Related Disorder   R/o Alcohol Use Disorder     Plan:  1. Follow up with this provider on 6/26  2. Follow up CCPS visit on 6/13  3. On waitlist for [facility]   4. Follow up with Dr. Manzo on 7/7  5. Connect to longer term therapy options   6.  After Visit Summary will be viewed in Bonny BLANCO, PhD, LP     NOTE: Treatment plan update due " 7/31/23 session.  Diagnostic assessment update due 6/15/2023.

## 2023-06-13 ENCOUNTER — VIRTUAL VISIT (OUTPATIENT)
Dept: BEHAVIORAL HEALTH | Facility: CLINIC | Age: 28
End: 2023-06-13
Payer: COMMERCIAL

## 2023-06-13 ENCOUNTER — VIRTUAL VISIT (OUTPATIENT)
Dept: PSYCHIATRY | Facility: CLINIC | Age: 28
End: 2023-06-13
Payer: COMMERCIAL

## 2023-06-13 DIAGNOSIS — F10.90 ALCOHOL USE DISORDER: ICD-10-CM

## 2023-06-13 DIAGNOSIS — F33.1 MAJOR DEPRESSIVE DISORDER, RECURRENT, MODERATE (H): Primary | ICD-10-CM

## 2023-06-13 DIAGNOSIS — F10.10 ALCOHOL ABUSE: ICD-10-CM

## 2023-06-13 PROCEDURE — 99215 OFFICE O/P EST HI 40 MIN: CPT | Mod: VID | Performed by: STUDENT IN AN ORGANIZED HEALTH CARE EDUCATION/TRAINING PROGRAM

## 2023-06-13 PROCEDURE — 90832 PSYTX W PT 30 MINUTES: CPT | Mod: VID | Performed by: SOCIAL WORKER

## 2023-06-13 RX ORDER — ARIPIPRAZOLE 2 MG/1
2 TABLET ORAL DAILY
Qty: 60 TABLET | Refills: 0 | Status: SHIPPED | OUTPATIENT
Start: 2023-06-13 | End: 2023-07-07

## 2023-06-13 RX ORDER — NALTREXONE HYDROCHLORIDE 50 MG/1
50 TABLET, FILM COATED ORAL DAILY
Qty: 30 TABLET | Refills: 1 | Status: SHIPPED | OUTPATIENT
Start: 2023-06-13 | End: 2024-01-02

## 2023-06-13 ASSESSMENT — PATIENT HEALTH QUESTIONNAIRE - PHQ9
SUM OF ALL RESPONSES TO PHQ QUESTIONS 1-9: 11
10. IF YOU CHECKED OFF ANY PROBLEMS, HOW DIFFICULT HAVE THESE PROBLEMS MADE IT FOR YOU TO DO YOUR WORK, TAKE CARE OF THINGS AT HOME, OR GET ALONG WITH OTHER PEOPLE: SOMEWHAT DIFFICULT
SUM OF ALL RESPONSES TO PHQ QUESTIONS 1-9: 11
SUM OF ALL RESPONSES TO PHQ QUESTIONS 1-9: 11
10. IF YOU CHECKED OFF ANY PROBLEMS, HOW DIFFICULT HAVE THESE PROBLEMS MADE IT FOR YOU TO DO YOUR WORK, TAKE CARE OF THINGS AT HOME, OR GET ALONG WITH OTHER PEOPLE: SOMEWHAT DIFFICULT
SUM OF ALL RESPONSES TO PHQ QUESTIONS 1-9: 11

## 2023-06-13 NOTE — PROGRESS NOTES
"Virtual Visit Details    Type of service:  Video Visit     Originating Location (pt. Location): Home    Distant Location (provider location):  Off-site  Platform used for Video Visit: [employer]    ADMINISTRATIVE BILLING:    Time spent interviewing patient, reviewing referral documents, obtaining and reviewing outside records, communication with other health specialists, and preparing this report on today's date  Video start: 6305  Video stop: 3034    Total time: 40 mins      [facility] PSYCHIATRY SERVICE FOLLOW-UP     Name:  Tato Adler  : 1995     Patient is a 28 year old year old White, adult  who [facility]  Patient was initially referred by their PCP. Patient attended the session alone.     Patient care team: Patient Care Team:  Ilene Manzo DO as PCP - General (Family Medicine)  Lauren Hickman MD as Intern (Student in organized health care education/training program)  Ilene Manzo DO as Assigned PCP    INTERIM HISTORY   Pt was last seen in [facility] for intake on 25 May 23. At that time, the plan included   MEDICATIONS:   -continue BUPROPION XL 300mg daily  -start ARIPIPRAZOLE 2mg daily  LABS/RADS:   -TSH, Vit D.    Interim pt communication:  none    Available records were reviewed prior to visit.    HISTORY OF PRESENT ILLNESS     Per [facility] Zully Cherry during today's team-based visit: \"MH Update: Completed intake with IOP at [facility] and starts evening IOP tonight. Feeling \"nervous.\" 2 days sober and worried about how it will go to last 10 weeks. Having evening IOP will help cut back on urge to drink at night. Depression has improved; SI has greatly decreased. Mood is better but has some days of feeling \"rough.\" Past week has been the best in the past several months.  proceesed fears and worries with IOP and      SI: decreased. Able to redirect self and not think about them, and not fixate on them when they do " "happen. Not thinking of ways or having intent. Feeling safe. Reminded to use crisis resources.  Tx: Allan Caruso with [facility] for past year. Talk about current needs, stable thing in his life. Working on self-care and limiting etoh but struggles with follow through. Start IOP tonight   Hx: never seen a psychiatrist. Therapy on and off over the past five years and has seen various providers at different places. Noticed depression and anxiety in college, 2017. Worsened over the past year. No prior IOP, day treatment or PHP. No inpatient admissions.  Stressors: usual stress with work and family relationships  Etoh: 2 days sober (last drank 06/11/2023)  Substance: denies  Nicotine: decreased to waiting several hours. 30%  Caffeine: 1 drink a day.  Most Important: check on medications, seems to be helping. Impact of still drinking while taking.\"    ---Psychiatry Update---  Pt is feeling nervous today. He is starting IOP at [facility] this evening. \"It is moving fast which is a little overwhelming.\" He's hopeful that the dual dx IOP will be helpful. He's doing the evening program from 5-9pm 3 days/week x 10 weeks.     Mood as been \"better\" the past few weeks. He is taking aripiprazole daily and has noticed SI thoughts are \"less persistent.\" He thinks they've \"faded to the background.\"     Suicidal ideation:  intermittent morbid thoughts, no plan or intent.   PHQ9 score is 11 indicating moderate depression.  GAD7 score is 1 indicates subclinical anxiety    Medication side effects: Denies new SE    Sleep: Stopped drinking two days ago. \"Sleeping sucks. I feel like I got better sleep when I was drinking.\" He's been having trouble falling and staying asleep since stopping alcohol. Benadryl is helping make him drowsy and melatonin 10mg seems to be helping too.     Medical: Pt is trying to quit vaping while also cutting back on alcohol. He says he's been having intermittent chest pressure as he works on reducing his vaping. He " "doesn't want to go to the ER because he thinks the pressure feels more like tightness associated with cutting back nicotine.     MEDICATIONS                                                                                              Current medications reviewed today and are noted below.   Current psychotropic medications:   Bupropion XL 300mg  Aripiprazole (Abilify) 2mg      Past psychotropic medications:  Citalopram  Hydroxyzine  Sertraline - historically helpful in undergrad; \"I remember it really changing things around\" then but not effective this time     Supplements:   See below       Not reviewed    Current Outpatient Medications   Medication Sig     aluminum chloride (DRYSOL) 20 % external solution Apply topically At Bedtime     aluminum chloride (DRYSOL) 20 % external solution      ARIPiprazole (ABILIFY) 2 MG tablet Take 1 tablet (2 mg) by mouth daily For depression     buPROPion (WELLBUTRIN XL) 300 MG 24 hr tablet Take 1 tablet (300 mg) by mouth every morning     estradiol (VAGIFEM) 10 MCG TABS vaginal tablet Place one tablet vaginally at night for 7-14 days prior to pap smear (Patient not taking: Reported on 5/17/2023)     Multiple Vitamin (DAILY VITAMINS) TABS      testosterone cypionate (DEPOTESTOSTERONE) 200 MG/ML injection Inject 0.25 mLs (50 mg) into the muscle once a week     testosterone cypionate (DEPOTESTOSTERONE) 200 MG/ML injection Inject 75 mg into the muscle (Patient not taking: Reported on 5/25/2023)     No current facility-administered medications for this visit.        VITALS   There were no vitals taken for this visit.    Pulse Readings from Last 5 Encounters:   05/11/23 111   04/28/23 98   02/28/23 100   01/24/23 88   12/16/22 96     Wt Readings from Last 5 Encounters:   05/11/23 91.6 kg (202 lb)   04/28/23 91.8 kg (202 lb 6.4 oz)   02/28/23 91.7 kg (202 lb 3.2 oz)   01/24/23 92.6 kg (204 lb 3.2 oz)   12/16/22 92.6 kg (204 lb 3.2 oz)     BP Readings from Last 5 Encounters:   05/11/23 " "129/87   04/28/23 125/84   02/28/23 115/81   01/24/23 130/84   12/16/22 130/87       LABS & IMAGING                                                                                                                Recent available labs reviewed today.    Recent Labs   Lab Test 06/01/23  1219 04/20/22  0953   CR 0.94 0.78   GFRESTIMATED  --  >90     Recent Labs   Lab Test 06/01/23  1219 04/20/22  0953   AST 29 23   ALT 32 32   ALKPHOS 77 68     Recent Labs   Lab Test 06/01/23  1219   TSH 1.53       ALLERGY & IMMUNIZATIONS     No Known Allergies    MEDICAL & SURGICAL HISTORY    Reviewed past medical and surgical history today.   Pregnant - Denies.     Past Medical History:   Diagnosis Date     Depressive disorder      Hyperlipidemia        MENTAL STATUS EXAM:     Alertness: alert  and oriented  Appearance: adequately groomed  Behavior/Demeanor: cooperative, pleasant and calm, with good eye contact   Speech: normal and regular rate and rhythm  Language: intact and no problems  Psychomotor: normal or unremarkable  Mood: \"better\"  Affect: full-range and appropriate; was congruent to mood; was congruent to content  Thought Process/Associations: unremarkable  Thought Content:  Reports SI thoughts without plan/intent;  Denies violent ideation and delusions  Perception:  Reports none;  Denies auditory hallucinations and visual hallucinations  Insight: intact  Judgment: intact  Cognition: does  appear grossly intact; formal cognitive testing was not done  Gait and Station: KEVIN     RISK AND PROTECTIVE FACTORS     Static Risk Factors: sex and history of MH diagnoses and/or treatment  Firearms/Weapons Access: No: Patient denies  Dynamic Risk Factors: emotional distress, substance use, anxiety, mental health stigma and feelings of shame     Protective Factors: hope for the future, compliance with medication, problem solving ability, future oriented, restricted access to means, engaged in EBT, resilience, perceived internal locus of " control, social support, access to care as needed, motivation and readiness for change, reasons for living and displaying help seeking behavior     SAFETY ASSESSMENT      Based on review of above risk and protective factors and today's exam, pt is at low acute risk of harm to self or others and chronic elevated risk due to history and risk factors. He does not meet criteria for a 72 hr hold and remains appropriate for ongoing outpatient care. The patient endorses morbid ideation without plan/intent. There was no deceit detected, and the patient presented in a manner that was believable. Local community safety resources printed and reviewed for patient to use if needed.     Recommended that patient call 911 or go to the local ED should there be a change in any of these risk factors.     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     DIFFERENTIAL DIAGNOSIS: NA     Medical comorbidities impacting or contributing to clinical picture: None noted  Known issue that I take into account for their medical decisions, no current exacerbations or new concerns.     ASSESSMENT AND PLAN       ASSESSMENT:  Tato Adler is a 28 year old White, adult who presents for initial visit with [facility] ([facility]) for medication management.   25 May 23: Pt is a transgender FtM on hormone therapy who presents for eval/treatment of his depression. He describes depression x 1 year or so but worse in the past several months. He rhina by having 5-6 drinks/night and has tried unsucessfully to cut back a few months ago. He hasn't perceived benefit from medications he's tried with his PCP (incl two SSRI and bupropion). We discussed the impact that etoh has on medication effectiveness and overall mood  in general. We discussed several options for care today including augmenting with aripiprazole vs considering cytomel given his last TSH was high end of normal vs switching from bupropion to snri. We did review increased risk of seizure with bupropion + etoh. Pt is most interested in aripiprazole augmentation, which I think is a reasonable next step. I did advise adding TSH and Vit D to recent labs ordered by PCP in case we do want to consider cytomel in the future and check for other contributors to mood problems. We briefly discussed naltrexone for his cravings and I would like to see what his CMP shows when he gets labs done. Pt endorses daily thoughts of death without plan/intent. He will rtc in 3 weeks.   Today 13 Jun 23: Pt is a transgender FtM with depression and alcohol abuse who returns to clinic after starting aripiprazole reporting improving depressive sx and a reduction in intensity and frequency of SI thoughts. He is starting a dual dx IOP at [facility] and is feeling nervous but hopeful as he stopped drinking two days ago. He is reporting strong cravings for etoh and we discussed r/b/se of naltrexone initiation for etoh cravings and relapse prevention; pt agrees to this. Recommended no other medicine changes as I suspect abstinence and IOP will provide support for pt's current depressive sx as well. He agrees to this plan and will rtc in 6 weeks if not seeing psychiatry through IOP, otherwise will rtc in 10 weeks.      TREATMENT PLAN: Medication side effects and alternatives reviewed. Health promotion activities recommended and reviewed. All questions addressed. Education and counseling completed regarding risks and benefits of medications and psychotherapy options. Collaborative Care Psychiatry Service model reviewed today. Recommend therapy for additional support. Safety plan reviewed as indicated.     MEDICATIONS:   -continue BUPROPION XL 300mg daily  -continue ARIPIPRAZOLE 2mg daily  -start  NALTREXONE 50mg daily   -restart HYDROXYZINE (VISTARIL) 25-50mg nightly if needed     LABS/RADS:   -Last Labs Obtained: 6/1/23, reviewed    PATIENT STATUS:  [facility] offer care a specialty service for Primary Care Providers in the [facility] that seek to optimize psychotropic medications for unstable patients.  Once medications have been optimized, our providers discharge the patient back to the referring Primary Care Provider for ongoing medication management.  This type of system allows our providers to serve a high volume of patients.   -Pt will be seen for continued medication stabilization in [facility].    PSYCHOSOCIAL:   -IOP at [facility]  -Follow up with primary care provider as planned or for acute medical concerns.    PSYCHOEDUCATION:  Medication side effects and alternatives reviewed. Health promotion activities recommended and reviewed today. All questions addressed. Education and counseling completed regarding risks and benefits of medications and psychotherapy options.  Consent provided by patient/guardian  Call the psychiatric nurse line with medication questions or concerns at [phone].  [employer] may be used to communicate with your provider, but this is not intended to be used for emergencies.  BLACK BOX WARNING: Discussed the Food and Drug Administration (FDA) requires that all antidepressants carry a warning that some children, adolescents and young adults may be at increased risk of suicide when taking antidepressants. Anyone taking an antidepressant should be watched closely for worsening depression or unusual behavior especially in the first few weeks after starting an SSRI. Keep in mind, antidepressants are more likely to reduce suicide risk in the long run by improving mood.   HARM REDUCTION:  Discussions regarding effects of mood altering substances, alcohol and cannabis, on mood and that approach is harm reduction, will continue to prescribe meds as they work to cut back  use.    FIRST GENERATION ANTIPSYCHOTIC/ SECOND GENERATION ANTIPSYCHOTIC USE:  Atypical need for cardiometabolic monitoring with medication- B/P, weight, blood sugar, cholesterol.  Need to monitor for abnormal movements taught  Medlineplus.gov is information for patients.  It is run by the [employer] and it contains information about all disorders, diseases and all medications.      FOLLOW-UP: Follow up in 6-10 weeks pending pt seeing     1. Continue all other treatments (including medications) per primary care provider and/or specialists.   2. To schedule individual or family therapy, call [facility] at [phone].   3. Follow up with primary care provider as planned or for acute medical concerns.  4. Call the psychiatric nurse line with medication questions or concerns at [phone] or [phone].  5. [employer] may be used to communicate with your care team, but this is not intended to be used for emergencies.    CRISIS RESOURCES:    1. Present to the Emergency Department as needed or call after hours crisis line at [phone] or [phone].   2. [place] Crisis Text Line: Text [place] to 378358.  3. Suicide LifeLine Chat: suicidepreventionOnformonicsline.org/chat/.  4. National Suicide Prevention Lifeline: 675.672.6194 (TTY: 327.506.3980). Call anytime for help.  (www.suicidepreventionlifeline.org)  5. National Pinch on Mental Illness (www.abena.org): 464.436.4863 or 050-835-9919.  6. Mental Health Association (www.mentalhealth.org): 265.881.1387 or 689-840-8245.    Signed:   Fransico Tavarez DNP, PMHNP-BC  Collaborative Care Psychiatry Service ([facility])

## 2023-06-13 NOTE — PROGRESS NOTES
[facility] Psychiatry [facility]  June 13, 2023      Behavioral Health Clinician Progress Note    Patient Name: Tato Adler           Service Type:  Individual      Service Location:   [facility] / Email (patient reached)     Session Start Time: 13:03  Session End Time: 13:29      Session Length: 16 - 37      Attendees: Patient     Service Modality:  Video Visit:      Provider verified identity through the following two step process.  Patient provided:  Patient is known previously to provider and Patient was verified at admission/transfer    Telemedicine Visit: The patient's condition can be safely assessed and treated via synchronous audio and visual telemedicine encounter.      Reason for Telemedicine Visit: Services only offered telehealth    Originating Site (Patient Location): parking lot at [facility]    Distant Site (Provider Location): Provider Remote Setting- Home Office    Consent:  The patient/guardian has verbally consented to: the potential risks and benefits of telemedicine (video visit) versus in person care; bill my insurance or make self-payment for services provided; and responsibility for payment of non-covered services.     Patient would like the video invitation sent by:  My Chart    Mode of Communication:  Video Conference via [facility] Location (Provider):  Off-site    As the provider I attest to compliance with applicable laws and regulations related to telemedicine.    Visit Activities (Refresh list every visit): NEW and [facility] Only    Diagnostic Assessment Date: 05/17/2023   Treatment Plan Review Date: Due by 3rd visit (next)  See Flowsheets for today's PHQ-9 and ROWDY-7 results  Previous PHQ-9:       5/25/2023     8:57 AM 6/5/2023     8:31 AM 6/13/2023    10:01 AM   PHQ-9 SCORE   PHQ-9 Total Score [facility] 14 (Moderate depression)  11 (Moderate depression)   PHQ-9 Total Score 14    14 14 11    11     Previous ROWDY-7:        "2/28/2023     3:49 PM 3/6/2023     4:10 PM 6/5/2023     8:31 AM   ROWDY-7 SCORE   Total Score 4 3 2       ALEJANDRO LEVEL:       View : No data to display.                DATA  Extended Session (60+ minutes): No  Interactive Complexity: No  Crisis: No  [facility] Patient: No    Treatment Objective(s) Addressed in This Session:  Target Behavior(s): Depression and alcohol use    Depressed Mood: Increase interest, engagement, and pleasure in doing things  Decrease frequency and intensity of feeling down, depressed, hopeless  Improve quantity and quality of night time sleep / decrease daytime naps  Feel less tired and more energy during the day   Decrease thoughts that you'd be better off dead or of suicide / self-harm  Alcohol / Substance Use: will make healthier choices in regard to substance use    Current Stressors / Issues:  MH Update: Completed intake with IOP at [facility] and [facility] and starts evening IOP tonight. Feeling \"nervous.\" 2 days sober and worried about how it will go to last the full 10 weeks of IOP.  Patient does feel that having an  evening IOP will help cut back on urge to drink at night. Depression has improved; SI has greatly decreased. Mood is better but has some days of feeling \"rough.\" Past week has been the best in the past several months.  SI: decreased. Able to redirect self and not think about them, and not fixate on them when they do happen. Not thinking of ways or having intent. Feeling safe. Reminded to use crisis resources.    [facility] praised patient for his insight.  [facility] validated patient's fears and worries regarding his IOP and sobriety.  [facility] assisted patient in identifying ways that he can be prepared for his IOP and who to reach out to for both personal and professional support should he fear potential relapse.  [facility] processed the reality of relapse and patient being able to set short-term realistic goals for himself as to how long he wants to maintain each level of sobriety.  [facility] processed the " importance of patient recognizing the day-to-day approach as he works towards his longer term goals.  [facility] provided encouragement that patient will find replacement behaviors to help manage distress and not need to turn to alcohol.  [facility] assessed for safety and encouraged patient to access his crisis resources and safety plan as needed    Stressors: usual stress with work and family relationships    Tx: Allan Caruso with [facility] for past year.  Focusing on self-care and trying to maintain sobriety.  Starts dual diagnosis IOP tonight at [facility].    Etoh: 2 days sober (last drank 06/11/2023).  [facility] encouraged patient to know his actual sobriety date to help celebrate his successes along the way.  Substance: denies  Nicotine: decreased to waiting several hours. 30%  Caffeine: 1 drink a day.    Most Important: check on medications, seems to be helping.  Patient is curious to know what the impact of taking medication and drinking can cause as far as efficacy.    Progress on Treatment Objective(s) / Homework:  Satisfactory progress - ACTION (Actively working towards change); Intervened by reinforcing change plan / affirming steps taken    Motivational Interviewing    MI Intervention: Expressed Empathy/Understanding, Supported Autonomy, Collaboration, Evocation, Reflections: simple and complex and Change talk (evoked)     Change Talk Expressed by the Patient: Desire to change Reasons to change Need to change    Provider Response to Change Talk: E - Evoked more info from patient about behavior change and A - Affirmed patient's thoughts, decisions, or attempts at behavior change    Also provided psychoeducation about behavioral health condition, symptoms, and treatment options    Care Plan review completed: No    Medication Review:  No changes to current psychiatric medication(s)    Medication Compliance:  Yes    Changes in Health Issues:   None reported    Chemical Use Review:   Substance Use: decrease in alcohol  .  Patient reports frequency of use Patient has been sober 2 days.  Sobriety date is 06/12/2023.  Provided encouragement towards sobriety  Provided support and affirmation for steps taken towards sobriety         Tobacco Use: Yes, decrease.  Patient reports frequency of use vaping every few hours instead of hourly.  30mg. Provided encouragement to quit   Provided support and affirmation for steps taken towards quiting [facility] discussed the reality that patient may not be able to continue reducing his use as he works on sobriety.  This will continue to be monitored.    Assessment: Current Emotional / Mental Status (status of significant symptoms):  Risk status (Self / Other harm or suicidal ideation)  Patient has had a history of suicidal ideation: Patient reports a history of suicidal ideation but denies that he has ever had a plan or intent.  He denies any prior attempts  Patient denies current fears or concerns for personal safety.  Patient reports the following current or recent suicidal ideation or behaviors: Patient reports a decrease in suicidal ideation.  He denies he has a plan or intent and does feel safe.  Patient denies current or recent homicidal ideation or behaviors.  Patient denies current or recent self injurious behavior or ideation.  Patient denies other safety concerns.  A safety and risk management plan has been developed including: Patient consented to co-developed safety plan.  A safety and risk management plan was completed.  Patient agreed to use safety plan should any safety concerns arise.  A copy was given to the patient.  Patient has a safety plan completed from 05/17/2023 and was encouraged to use it as needed.    Appearance:   Appropriate   Eye Contact:   Good   Psychomotor Behavior: Normal   Attitude:   Cooperative  Interested  Orientation:   All  Speech   Rate / Production: Normal    Volume:  Normal   Mood:    Depressed Improving  Affect:    Appropriate   Thought Content:  Clear   Thought  Form:  Coherent  Logical   Insight:    Good     Diagnoses:  1. Major depressive disorder, recurrent, moderate (H)    2. Alcohol abuse      Collateral Reports Completed:  Communicated with: CCPS Team    Plan: (Homework, other):  Patient was given information about behavioral services and encouraged to schedule a follow up appointment with the clinic [facility] as needed.  He was also given information about mental health symptoms and treatment options .  CD Recommendations: Patient is on the wait list for a dual diagnosis IOP.       Zully Cherry, GONZALEZ  June 13, 2023                Answers for HPI/ROS submitted by the patient on 6/13/2023  If you checked off any problems, how difficult have these problems made it for you to do your work, take care of things at home, or get along with other people?: Somewhat difficult  PHQ9 TOTAL SCORE: 11

## 2023-06-13 NOTE — NURSING NOTE
Is the patient currently in the state of [place]? YES    Visit mode:VIDEO    If the visit is dropped, the patient can be reconnected by: VIDEO VISIT: Send to e-mail at: [email or web address]    Will anyone else be joining the visit? NO      How would you like to obtain your AVS? MyChart    Are changes needed to the allergy or medication list? NO    Reason for visit: MONI MINOR

## 2023-06-13 NOTE — PATIENT INSTRUCTIONS
Eliel Godwin,    Thank you for our time together today in Collaborative Care Psychiatry Service (CCPS). CCPS provides brief psychiatric medication stabilization to patients referred by their Primary Care Providers. Patients are typically seen in CCPS for up to 4 appointments and then referred back to the PCP for ongoing refills unless longer term medication management by a specialist is indicated. If I believe you will benefit from long-term psychiatric care I will discuss this with you. If you are interested in seeing a psychiatrist or psychiatric nurse practitioner long-term, please send me a message in [employer] so we can refer you appropriately.     Treatment Plan:  -continue BUPROPION XL 300mg daily  -continue ARIPIPRAZOLE 2mg daily  -start NALTREXONE 50mg daily   -restart HYDROXYZINE (VISTARIL) 25-50mg nightly if needed   Follow up in 6 weeks if not seeing psychiatry during IOP or 10-austin weeks if seeing psychiatry during IOP  You can call [phone] to make appointments, leave a message with our nursing team, and inquire about any mental health referrals I have placed.  Please call your pharmacy to request a refill of your medicines listed above if needed between appointments.     MARILYN Noland  Collaborative Care Psychiatry Service  [facility]   Patient Education   Merged with [facility] Care Psychiatry Service  What to Expect  Here's what to expect from your Collaborative Care Psychiatry Service (CCPS).   About CCPS  CCPS means 2 people work together to help you get better. You'll meet with a behavioral health clinician and a psychiatric doctor. A behavioral health clinician helps people with mental health problems by talking with them. A psychiatric doctor helps people by giving them medicine.  How it works  At every visit, you'll see the behavioral health clinician (BHC) first. They'll talk with you about how you're doing and teach you how to feel better.   Then you'll see the psychiatric doctor. This  "doctor can help you deal with troubling thoughts and feelings by giving you medicine. They'll make sure you know the plan for your care.   CCPS usually takes 3 to 6 visits. If you need more visits, we may have you start seeing a different psychiatric doctor for ongoing care.  If you have any questions or concerns, we'll be glad to talk with you.  About visits  Be open  At your visits, please talk openly about your problems. It may feel hard, but it's the best way for us to help you.  Cancelling visits  If you can't come to your visit, please call us right away at [phone]. If you don't cancel at least 24 hours (1 full day) before your visit, that's \"late cancellation.\"  Being late to visits  Being very late is the same as not showing up. You will be a \"no show\" if:  Your appointment starts with a C, and you're more than 15 minutes late for a 30-minute (half hour) visit. This will also cancel your appointment with the psychiatric doctor.  Your appointment is with a psychiatric doctor only, and you're more than 15 minutes late for a 30-minute (half hour) visit.  Your appointment is with a psychiatric doctor only, and you're more than 30 minutes late for a 60-minute (full hour) visit.  If you cancel late or don't show up 2 times within 6 months, we may end your care.   Getting help between visits  If you need help between visits, you can call us Monday to Friday from 8 a.m. to 4:30 p.m. at [phone].  Emergency care  Call 911 or go to the nearest emergency department if your life or someone else's life is in danger.  Call 988 anytime to reach the national Suicide and Crisis hotline.  Medicine refills  To refill your medicine, call your pharmacy. You can also call [facility]'s Behavioral Access at [phone], Monday to Friday, 8 a.m. to 4:30 p.m. It can take 1 to 3 business days to get a refill.   Forms, letters, and tests  You may have papers to fill out, like FMLA, short-term disability, and " workability. We can help you with these forms at your visits, but you must have an appointment. You may need more than 1 visit for this, to be in an intensive therapy program, or both.  Before we can give you medicine for ADHD, we may refer you to get tested for it or confirm it another way.  We may not be able to give you an emotional support animal letter.  We don't do mental health checks ordered by the court.   We don't do mental health testing, but we can refer you to get tested.   Thank you for choosing us for your care.  For informational purposes only. Not to replace the advice of your health care provider. Copyright   2022 [facility]. All rights reserved. [employer] [id number] - 12/22.

## 2023-06-15 ENCOUNTER — HOSPITAL ENCOUNTER (EMERGENCY)
Facility: CLINIC | Age: 28
Discharge: HOME | End: 2023-06-15
Attending: EMERGENCY MEDICINE | Admitting: EMERGENCY MEDICINE
Payer: COMMERCIAL

## 2023-06-15 ENCOUNTER — APPOINTMENT (OUTPATIENT)
Dept: GENERAL RADIOLOGY | Facility: CLINIC | Age: 28
End: 2023-06-15
Attending: EMERGENCY MEDICINE
Payer: COMMERCIAL

## 2023-06-15 VITALS
BODY MASS INDEX: 32.49 KG/M2 | SYSTOLIC BLOOD PRESSURE: 136 MMHG | OXYGEN SATURATION: 97 % | RESPIRATION RATE: 10 BRPM | TEMPERATURE: 97 F | DIASTOLIC BLOOD PRESSURE: 87 MMHG | WEIGHT: 195 LBS | HEART RATE: 68 BPM | HEIGHT: 65 IN

## 2023-06-15 DIAGNOSIS — R07.9 CHEST PAIN, UNSPECIFIED TYPE: ICD-10-CM

## 2023-06-15 DIAGNOSIS — I10 BENIGN ESSENTIAL HYPERTENSION: ICD-10-CM

## 2023-06-15 LAB
ALBUMIN SERPL BCG-MCNC: 4.7 G/DL (ref 3.5–5.2)
ALP SERPL-CCNC: 79 U/L (ref 35–129)
ALT SERPL W P-5'-P-CCNC: 40 U/L (ref 0–70)
ANION GAP SERPL CALCULATED.3IONS-SCNC: 12 MMOL/L (ref 7–15)
AST SERPL W P-5'-P-CCNC: 33 U/L (ref 0–45)
BASOPHILS # BLD AUTO: 0 10E3/UL (ref 0–0.2)
BASOPHILS NFR BLD AUTO: 0 %
BILIRUB SERPL-MCNC: 0.7 MG/DL
BUN SERPL-MCNC: 14.2 MG/DL (ref 6–20)
CALCIUM SERPL-MCNC: 9.7 MG/DL (ref 8.6–10)
CHLORIDE SERPL-SCNC: 99 MMOL/L (ref 98–107)
CREAT SERPL-MCNC: 0.98 MG/DL (ref 0.51–1.17)
D DIMER PPP FEU-MCNC: <0.27 UG/ML FEU (ref 0–0.5)
DEPRECATED HCO3 PLAS-SCNC: 25 MMOL/L (ref 22–29)
EOSINOPHIL # BLD AUTO: 0.1 10E3/UL (ref 0–0.7)
EOSINOPHIL NFR BLD AUTO: 1 %
ERYTHROCYTE [DISTWIDTH] IN BLOOD BY AUTOMATED COUNT: 11.9 % (ref 10–15)
GFR SERPL CREATININE-BSD FRML MDRD: NORMAL ML/MIN/{1.73_M2}
GLUCOSE SERPL-MCNC: 98 MG/DL (ref 70–99)
HCT VFR BLD AUTO: 48.8 % (ref 35–53)
HGB BLD-MCNC: 16.7 G/DL (ref 11.7–17.7)
HOLD SPECIMEN: NORMAL
IMM GRANULOCYTES # BLD: 0 10E3/UL
IMM GRANULOCYTES NFR BLD: 0 %
LIPASE SERPL-CCNC: 31 U/L (ref 13–60)
LYMPHOCYTES # BLD AUTO: 3.3 10E3/UL (ref 0.8–5.3)
LYMPHOCYTES NFR BLD AUTO: 47 %
MCH RBC QN AUTO: 32.3 PG (ref 26.5–33)
MCHC RBC AUTO-ENTMCNC: 34.2 G/DL (ref 31.5–36.5)
MCV RBC AUTO: 94 FL (ref 78–100)
MONOCYTES # BLD AUTO: 0.6 10E3/UL (ref 0–1.3)
MONOCYTES NFR BLD AUTO: 9 %
NEUTROPHILS # BLD AUTO: 3 10E3/UL (ref 1.6–8.3)
NEUTROPHILS NFR BLD AUTO: 43 %
NRBC # BLD AUTO: 0 10E3/UL
NRBC BLD AUTO-RTO: 0 /100
PLATELET # BLD AUTO: 269 10E3/UL (ref 150–450)
POTASSIUM SERPL-SCNC: 4.1 MMOL/L (ref 3.4–5.3)
PROT SERPL-MCNC: 7.6 G/DL (ref 6.4–8.3)
RBC # BLD AUTO: 5.17 10E6/UL (ref 3.8–5.9)
SODIUM SERPL-SCNC: 136 MMOL/L (ref 136–145)
TROPONIN T SERPL HS-MCNC: <6 NG/L
WBC # BLD AUTO: 7 10E3/UL (ref 4–11)

## 2023-06-15 PROCEDURE — 84484 ASSAY OF TROPONIN QUANT: CPT | Performed by: EMERGENCY MEDICINE

## 2023-06-15 PROCEDURE — 71046 X-RAY EXAM CHEST 2 VIEWS: CPT

## 2023-06-15 PROCEDURE — 85025 COMPLETE CBC W/AUTO DIFF WBC: CPT | Performed by: EMERGENCY MEDICINE

## 2023-06-15 PROCEDURE — 83690 ASSAY OF LIPASE: CPT | Performed by: EMERGENCY MEDICINE

## 2023-06-15 PROCEDURE — 250N000013 HC RX MED GY IP 250 OP 250 PS 637: Performed by: EMERGENCY MEDICINE

## 2023-06-15 PROCEDURE — 85379 FIBRIN DEGRADATION QUANT: CPT | Performed by: EMERGENCY MEDICINE

## 2023-06-15 PROCEDURE — 99284 EMERGENCY DEPT VISIT MOD MDM: CPT | Mod: 25

## 2023-06-15 PROCEDURE — 80053 COMPREHEN METABOLIC PANEL: CPT | Performed by: EMERGENCY MEDICINE

## 2023-06-15 PROCEDURE — 36415 COLL VENOUS BLD VENIPUNCTURE: CPT | Performed by: EMERGENCY MEDICINE

## 2023-06-15 RX ORDER — MAGNESIUM HYDROXIDE/ALUMINUM HYDROXICE/SIMETHICONE 120; 1200; 1200 MG/30ML; MG/30ML; MG/30ML
15 SUSPENSION ORAL ONCE
Status: COMPLETED | OUTPATIENT
Start: 2023-06-15 | End: 2023-06-15

## 2023-06-15 RX ORDER — LORAZEPAM 1 MG/1
1 TABLET ORAL ONCE
Status: COMPLETED | OUTPATIENT
Start: 2023-06-15 | End: 2023-06-15

## 2023-06-15 RX ADMIN — ALUMINUM HYDROXIDE, MAGNESIUM HYDROXIDE, AND SIMETHICONE 15 ML: 200; 200; 20 SUSPENSION ORAL at 18:00

## 2023-06-15 RX ADMIN — LORAZEPAM 1 MG: 1 TABLET ORAL at 18:25

## 2023-06-15 ASSESSMENT — HEART SCORE
HEART SCORE: 1
TROPONIN: LESS THAN OR EQUAL TO NORMAL LIMIT
AGE: <45
ECG: NORMAL
RISK FACTORS: 1-2 RISK FACTORS
HISTORY: SLIGHTLY SUSPICIOUS

## 2023-06-15 NOTE — ED PROVIDER NOTES
"  History     Chief Complaint:  Chest Pain       HPI   Tato Adler is a 28 year old adult who presents with chest pain. He explains that for the past two weeks he has had intermittent left sided chest pain. He describes episodes of chest pain describes as a pressure sensation, these episodes usually last a couple hours. Today his chest pain worsened while driving and he became dizzy and lightheaded, prompting his ED visit. In the ED he claims he stills has the pain and it is about a 3/10 in severity. He adds that he worked out yesterday and noted that exertion did not worsen his pain. Denies nausea, vomiting, history of reflux, recent travel, recent strenous activity or injury, or recent surgery. He endorses familial history of heart problems and high blood pressure. He endorses use of tobacco products.     Independent Historian:   None - Patient Only    Review of External Notes:   Telephone encounter from earlier today and progress note from 6/13/2023    Medications:    Drysol  Abilify  Wellbutrin  Depade    Past Medical History:    Depressive disorder  Hyperlipidemia  Gender dysphoria  Atypical squamous cells  Gonorrhea    Past Surgical History:    Mole removal  Tonsillectomy  Masculinizing chest surgery     Physical Exam     Patient Vitals for the past 24 hrs:   BP Temp Temp src Pulse Resp SpO2 Height Weight   06/15/23 1822 -- -- -- 75 10 98 % -- --   06/15/23 1800 (!) 144/103 -- -- 91 -- 98 % -- --   06/15/23 1742 138/84 -- -- 99 10 96 % -- --   06/15/23 1700 (!) 168/106 97  F (36.1  C) Temporal 113 20 99 % 1.651 m (5' 5\") 88.5 kg (195 lb)        Physical Exam  General: Patient is alert and anxious appearing  HEENT: Head atraumatic    Eyes: pupils equal and reactive. Conjunctiva clear   Nares: patent   Oropharynx: no lesions, uvula midline, no palatal draping, normal voice, no trismus  Neck: Supple without lymphadenopathy, no meningismus  Chest: Heart regular rate and rhythm.  Chest wall tenderness " to palpation  Lungs: Equal clear to auscultation with no wheeze or rales  Abdomen: Soft, non tender, nondistended, normal bowel sounds  Back: No costovertebral angle tenderness, no midline C, T or L spine tenderness  Neuro: Grossly nonfocal, normal speech, strength equal bilaterally, CN 2-12 intact  Extremities: No deformities, equal radial and DP pulses. No clubbing, cyanosis.  No edema  Skin: Warm and dry with no rash.       Emergency Department Course   ECG  ECG taken at 1701, ECG read at 1703  Normal sinus rhythm  Normal ECG   Rate 97 bpm. OK interval 152 ms. QRS duration 78 ms. QT/QTc 362/459 ms. P-R-T axes 49 39 27.     Imaging:  XR Chest 2 Views   Final Result   IMPRESSION: Negative chest. Lungs are clear. Normal heart size.         Exercise Stress Echocardiogram    (Results Pending)      Report per radiology    Laboratory:  Labs Ordered and Resulted from Time of ED Arrival to Time of ED Departure   TROPONIN T, HIGH SENSITIVITY - Normal       Result Value    Troponin T, High Sensitivity <6     D DIMER QUANTITATIVE - Normal    D-Dimer Quantitative <0.27     LIPASE - Normal    Lipase 31     COMPREHENSIVE METABOLIC PANEL    Sodium 136      Potassium 4.1      Chloride 99      Carbon Dioxide (CO2) 25      Anion Gap 12      Urea Nitrogen 14.2      Creatinine 0.98      Calcium 9.7      Glucose 98      Alkaline Phosphatase 79      AST 33      ALT 40      Protein Total 7.6      Albumin 4.7      Bilirubin Total 0.7      GFR Estimate       CBC WITH PLATELETS AND DIFFERENTIAL    WBC Count 7.0      RBC Count 5.17      Hemoglobin 16.7      Hematocrit 48.8      MCV 94      MCH 32.3      MCHC 34.2      RDW 11.9      Platelet Count 269      % Neutrophils 43      % Lymphocytes 47      % Monocytes 9      % Eosinophils 1      % Basophils 0      % Immature Granulocytes 0      NRBCs per 100 WBC 0      Absolute Neutrophils 3.0      Absolute Lymphocytes 3.3      Absolute Monocytes 0.6      Absolute Eosinophils 0.1      Absolute  Basophils 0.0      Absolute Immature Granulocytes 0.0      Absolute NRBCs 0.0          Procedures   none    Emergency Department Course & Assessments:       Interventions:  Medications   alum & mag hydroxide-simethicone (MAALOX) suspension 15 mL (15 mLs Oral $Given 6/15/23 1800)   LORazepam (ATIVAN) tablet 1 mg (1 mg Oral $Given 6/15/23 1825)        Assessments:  1730 I obtained history and examined the patient as noted above.  1840 I rechecked the patient and explained findings.     Independent Interpretation (X-rays, CTs, rhythm strip):  I reviewed patients chest XR, no pneumothorax or infiltrate.     Consultations/Discussion of Management or Tests:  None        Social Determinants of Health affecting care:   Stress/Adjustment Disorders    Disposition:  The patient was discharged to home.     Impression & Plan      Medical Decision Making:  Tato Adler is a 28 year old adult who presents for evaluation of chest pain.  This pain has lasted for several hours now. Initial laboratory and imaging tests have come back normal.  There has been no progression of symptoms or other new concerning symptoms.   Patient exercised yesterday with no chest pain with any exercise over the last several weeks.  D-dimer is negative making PE unlikely.  Doubt aortic dissection based on narrow mediastinum.  Blood pressure was mildly elevated and he is recommended to have recheck as an outpatient.  No evidence of pneumothorax or infiltrate on chest x-ray.    A broad differential was of course considered.  Exceedingly low risk for unstable angina at this point and will therefore not admit formally and administer heparin.    Discussed HEART score of 1 with patient and shared decision making regarding disposition and further workup was done. .    I will set the patient up for an outpatient stress echocardiogram within 72 hours.  The patient agrees with the plan and all questions were answered.  Return precautions emergency  department reviewed.  Follow-up instructions discussed.    Diagnosis:    ICD-10-CM    1. Chest pain, unspecified type  R07.9 Follow-Up with Cardiology [facility]     Exercise Stress Echocardiogram      2. Benign essential hypertension  I10            Discharge Medications:  New Prescriptions    No medications on file      Scribe Disclosure:  DAMION, MAGALY VANN, am serving as a scribe at 6:06 PM on 6/15/2023 to document services personally performed by Bernie Romo MD based on my observations and the provider's statements to me.        Bernie Romo MD  06/15/23 [id number]

## 2023-06-15 NOTE — ED TRIAGE NOTES
"Pt reports L anterior chest pain \"pressure/burning\" starting 10 minutes ago with associated dizziness, denies radiation of pain. Pt reports intermittent pain similar to this over the past week, worst today.    "

## 2023-06-15 NOTE — DISCHARGE INSTRUCTIONS
Discharge Instructions  Chest Pain    You have been seen today for chest pain or discomfort.  At this time, your provider has found no signs that your chest pain is due to a serious or life-threatening condition, (or you have declined more testing and/or admission to the hospital). However, sometimes there is a serious problem that does not show up right away. Your evaluation today may not be complete and you may need further testing and evaluation.     Generally, every Emergency Department visit should have a follow-up clinic visit with either a primary or a specialty clinic/provider. Please follow-up as instructed by your emergency provider today.  Return to the Emergency Department if:  Your chest pain changes, gets worse, starts to happen more often, or comes with less activity.  You are newly short of breath.  You get very weak or tired.  You pass out or faint.  You have any new symptoms, like fever, cough, numb legs, or you cough up blood.  You have anything else that worries you.    Until you follow-up with your regular provider, please do the following:  Take one aspirin daily unless you have an allergy or are told not to by your provider.  If a stress test appointment has been made, go to the appointment.  If you have questions, contact your regular provider.  Follow-up with your regular provider/clinic as directed; this is very important.    If you were given a prescription for medicine here today, be sure to read all of the information (including the package insert) that comes with your prescription.  This will include important information about the medicine, its side effects, and any warnings that you need to know about.  The pharmacist who fills the prescription can provide more information and answer questions you may have about the medicine.  If you have questions or concerns that the pharmacist cannot address, please call or return to the Emergency Department.       Remember that you can always come  back to the Emergency Department if you are not able to see your regular provider in the amount of time listed above, if you get any new symptoms, or if there is anything that worries you.  Discharge Instructions  Hypertension - High Blood Pressure    During you visit to the Emergency Department, your blood pressure was higher than the recommended blood pressure.  This may be related to stress, pain, medication or other temporary conditions. In these cases, your blood pressure may return to normal on its own. If you have a history of high blood pressure, you may need to have your provider adjust your medications. Sometimes, your high measurement here may indicate that you have developed high blood pressure that will stay high unless it is treated. As a general rule, high blood pressure causes problems over years rather than days, weeks, or months. So, while it is important to treat blood pressure, it is rarely important to treat blood pressure immediately. Occasionally we will begin a medication in the Emergency Department; more often we will recommend close follow-up for medications with a primary doctor/clinic.    Generally, every Emergency Department visit should have a follow-up clinic visit with either a primary or a specialty clinic/provider. Please follow-up as instructed by your emergency provider today.    Return to the Emergency Department if you start to have:  A severe headache.  Chest pain.  Shortness of breath.  Weakness or numbness that affects one part of the body.  Confusion.  Vision changes.  Significant swelling of legs and/or eyes.  A reaction to any medication started in the Emergency Department.    What can I do to help myself?  Avoid alcohol.  Take any blood pressure medicine that you are prescribed.  Get a good night s sleep.  Lower your salt intake.  Exercise.  Lose weight.  Manage stress.  See your doctor regularly    If blood pressure medication was started in the Emergency  Department:  The medicine may not have an immediate effect. The body and brain determine what blood pressure you have. The medicine s job is to retrain the body s  thermostat  to a lower blood pressure.  You will need to follow up with your provider to see how this medicine is working for you.  If you were given a prescription for medicine here today, be sure to read all of the information (including the package insert) that comes with your prescription.  This will include important information about the medicine, its side effects, and any warnings that you need to know about.  The pharmacist who fills the prescription can provide more information and answer questions you may have about the medicine.  If you have questions or concerns that the pharmacist cannot address, please call or return to the Emergency Department.   Remember that you can always come back to the Emergency Department if you are not able to see your regular provider in the amount of time listed above, if you get any new symptoms, or if there is anything that worries you.

## 2023-06-20 ENCOUNTER — HOSPITAL ENCOUNTER (OUTPATIENT)
Dept: CARDIOLOGY | Facility: CLINIC | Age: 28
Discharge: HOME | End: 2023-06-20
Attending: EMERGENCY MEDICINE | Admitting: EMERGENCY MEDICINE
Payer: COMMERCIAL

## 2023-06-20 DIAGNOSIS — R07.9 CHEST PAIN, UNSPECIFIED TYPE: ICD-10-CM

## 2023-06-20 PROCEDURE — 93018 CV STRESS TEST I&R ONLY: CPT | Performed by: INTERNAL MEDICINE

## 2023-06-20 PROCEDURE — 93350 STRESS TTE ONLY: CPT | Mod: 26 | Performed by: INTERNAL MEDICINE

## 2023-06-20 PROCEDURE — 93016 CV STRESS TEST SUPVJ ONLY: CPT | Performed by: INTERNAL MEDICINE

## 2023-06-20 PROCEDURE — 93325 DOPPLER ECHO COLOR FLOW MAPG: CPT | Mod: TC

## 2023-06-20 PROCEDURE — 93350 STRESS TTE ONLY: CPT | Mod: TC

## 2023-06-20 PROCEDURE — 93325 DOPPLER ECHO COLOR FLOW MAPG: CPT | Mod: 26 | Performed by: INTERNAL MEDICINE

## 2023-06-20 PROCEDURE — 93321 DOPPLER ECHO F-UP/LMTD STD: CPT | Mod: 26 | Performed by: INTERNAL MEDICINE

## 2023-06-26 ENCOUNTER — OFFICE VISIT (OUTPATIENT)
Dept: PSYCHOLOGY | Facility: CLINIC | Age: 28
End: 2023-06-26
Payer: COMMERCIAL

## 2023-06-26 DIAGNOSIS — F33.1 MAJOR DEPRESSIVE DISORDER, RECURRENT, MODERATE (H): Primary | ICD-10-CM

## 2023-06-26 DIAGNOSIS — F43.10 PTSD (POST-TRAUMATIC STRESS DISORDER): ICD-10-CM

## 2023-06-26 PROCEDURE — 90834 PSYTX W PT 45 MINUTES: CPT | Mod: 95 | Performed by: PSYCHOLOGIST

## 2023-06-26 NOTE — PATIENT INSTRUCTIONS
PsychologyToday    Therapist or Organization Children Adolescents Adults Family Support Groups Other   [facility]   (multiple providers)  [address]  [place] [address]  [phone]  [facility].[employer] YES YES YES YES  Relationship Counseling   [facility]  (Multiple providers and locations)  [phone]  [email or web address] YES YES YES      [facility]  (multiple providers)  [address]  [place] [address]  [phone]   sexualhealth.[facility] YES YES YES YES  Epic order ([facility])   [facility]  [address] [place] [address]  [phone]  [email or web address]   YES   Chemical Dependence Treatment   Yari Sims PsyD,   [address]  [place]  [address]  [phone]   YES YES     [facility]  (multiple providers)  [address] [place] [address], [address]  [email or web address]  [email or web address]  YES YES  YES    [facility] (multiple providers and locations)  [address] [place] [address]  [email or web address]  YES YES  YES    [facility]   (multiple providers and locations)  [email or web address] YES YES YES  YES DBT, Psych Testing   [facility]  (multiple providers)  [place], [place]  [phone]  [email or web address] YES YES YES YES  DBT   [facility]  (multiple providers)  [phone]  Therapy-[email or web address]  YES YES   Venezuelan language available   [facility]  (multiple providers)  [address] [place] [address]  [phone] (English/Venezuelan)  [phone] (Hmong)  [email or web address] YES YES YES   Venezuelan and Hmong language available   [facility]  (multiple providers)  [address] [place] [address]  [phone]  [employer] YES YES YES      Ariadne Garza Ph.D.  [email or web address]   YES      [facility]  (multiple providers)  [address]  [place] [address]   Phone: [phone]  [email or web address] YES YES YES YES  In home services available   Ebenezer Heath, Ph.D.   [phone]  [email or web address]   YES      Lauren Worthington Psy.D.,   [phone]   YES      Ana Mullen , [place], [place]  dario.[facility] YES YES YES      Flavia Blood, [facility]  [address]  [place] [address]  [employer]  [phone]   YES YES  On Busline  Couples too   Mery Laguerre PsyD, [facility]  [facility]  [address]  [place], [place]   [phone] YES YES YES YES  Business consulting   Brock Dolan, PhD, LP  [address]  [place] [address]  [phone]  T[employer]  Older Adolescents YES   On busline   Jh Rain, [facility]  [address]  [place] [address]  [phone]  [email or web address]  YES YES YES  Busline   Mónica Barajas M.Ed, [facility], [facility]  [address] [place], [place]   [phone]  [email or web address]   YES YES   Experienced in Trans Veterans    Britany Flor MA, LMFT, CST  [address]  [place], [place] [address] [phone]  [email or web address]  YES YES YES  Sex positive therapy, relationship counseling   [facility]  [address] [place], [place] [id number] [phone]  Reclaim.care YES YES  YES     [facility]  (Multiple providers and locations)  [phone]  [email or web address]  YES YES      [facility]  (Multiple providers and locations)  [phone]   [facility] YES YES YES YES     CARY Tapia  [address]  [place], [place] [address] [phone]  [email or web address]  YES YES   Chemical Dependence   [facility]  [place] [facility]  [address]  [place], [place] [id number] [phone]  Usbek [email or web address]   YES      G Robert Corley MFA, PsyD, LP   [address]  [place] [address]  [phone]  [email or web address]   YES YES  Sexuality groups  busline     Additional Provider Directories  [place]'s lesbian leyva bisexual transgender & allied mental health providers' network  [email or web address]    Directory for LGBT related resources in [place]  [email or web address]    Directory for kink, Polyamory, gender non-conforming aware Providers  http://www.ke.org

## 2023-06-26 NOTE — PROGRESS NOTES
Behavioral Health Progress Note    Client's Legal Name: Tato Adler    Client's Preferred Name: Tato  YOB: 1995  Type of Service: video, counseling   Length of Service:   Start time: 8:20AM   End time: 9:05AM   Duration:  45 minutes  Attendees: patient    Identifying Information and Presenting Problem:  Tato is a 28 year old adult being seen for problematic symptoms of depression. Additionally, he has identified an increased pattern of alcohol use in the past 2 years and expresses concern about this pattern.     Treatment Objective(s) Addressed in This Session:  Depressed Mood: Decrease frequency and intensity of feeling down, depressed, hopeless    Progress on / Status of Treatment Objective(s) / Homework:  Satisfactory Progress      Mental Health Screening Questionnaires:  PHQ-9:       5/25/2023     8:57 AM 6/5/2023     8:31 AM 6/13/2023    10:01 AM   PHQ   PHQ-9 Total Score 14    14 14 11    11   Q9: Thoughts of better off dead/self-harm past 2 weeks More than half the days    More than half the days More than half the days Several days    Several days   F/U: Thoughts of suicide or self-harm Yes    Yes  Yes    Yes   F/U: Self harm-plan No    No  No    No   F/U: Self-harm action No    No  No    No   F/U: Safety concerns No    No  No    No      ROWDY-7:       2/28/2023     3:49 PM 3/6/2023     4:10 PM 6/5/2023     8:31 AM   ROWDY-7 SCORE   Total Score 4 3 2       Topics Discussed/Interventions Provided:    CD treatment: processed experience at [facility]. Patient expressed not feeling like a good fit due to therapist's approach, mostly virtual format, perceived religiosity and lack of sense of belonging. He pursued [facility] and this has felt better. Praised for patient's persistence and proactive efforts.     Chest pains: patient presented to ED for intense chest pains. Results from a number of tests were reassuring. He expresses possible role of anxiety. Explored context  "surrounding pains, including engaging in BOY treatment and a visit to family. Since returning home, chest pains have not returned. Provider encouraged patient to reach out to psychiatric prescriber, given recent changes in medications. Provided some psychoeduation on panic-like symptoms.     Ongoing treatment: provided list of potential therapists for long term care and explored what patient is looking for in a therapist.     Assessment:   The patient appeared to be active and engaged in today's session and was receptive to feedback.     Mental Status:   During interaction with the examiner today, Godwin was cooperative, open, and engaged. Patient was generally alert and oriented to person, place, time, and situation. They were casually dressed, appropriately groomed and appeared stated age. Patient's attire was appropriate for the weather and occasion. Eye contact was good. Psychomotor functioning: normal or unremarkable. Speech was normal limits tone, rate, volume; largely coherent and relevant to topic. Mood was \"better but still not great\"; affect was somewhat flat. Thought processes were unremarkable. Thought content was notable for passive suicidal ideation, without any intent, urges, or planning, and contained no evidence of psychosis.  Memory appeared grossly intact without being formally evaluated. Insight: good. Judgment was good. Patient exhibited good impulse control during the appointment.    Does the patient appear to be at imminent risk of harm to self/others at this time? No    The session was necessary to address symptoms of trauma and low mood that have been interfering with patient's ability to function in areas related to interacting and relating with others, maintaining personal health and physical well-being and participating in meaningful activities. Ongoing psychotherapy is necessary to provide counseling.    DSM-5 Diagnosis:  Major Depressive Disorder, Recurrent, Moderate   Other Stressor or " Trauma-Related Disorder   R/o Alcohol Use Disorder     Plan:  1. Follow up with this provider on 7/11  2. Patient attending [facility] IOP at [facility]   3. Patient will reach out to psychiatric prescriber    4. Follow up with Dr. Manzo on 7/7  5. Long term therapy options in [facility]  6.  After Visit Summary will be viewed in [facility]     REECE BLANCO, PhD, LP     NOTE: Treatment plan update due 7/31/23.  Diagnostic assessment update due 5/17/2024.

## 2023-07-07 ENCOUNTER — OFFICE VISIT (OUTPATIENT)
Dept: FAMILY MEDICINE | Facility: CLINIC | Age: 28
End: 2023-07-07
Payer: COMMERCIAL

## 2023-07-07 VITALS
SYSTOLIC BLOOD PRESSURE: 119 MMHG | DIASTOLIC BLOOD PRESSURE: 80 MMHG | OXYGEN SATURATION: 96 % | WEIGHT: 202.5 LBS | HEART RATE: 76 BPM | BODY MASS INDEX: 33.74 KG/M2 | HEIGHT: 65 IN | TEMPERATURE: 98.1 F

## 2023-07-07 DIAGNOSIS — F33.2 SEVERE EPISODE OF RECURRENT MAJOR DEPRESSIVE DISORDER, WITHOUT PSYCHOTIC FEATURES (H): Primary | ICD-10-CM

## 2023-07-07 DIAGNOSIS — F41.9 ANXIETY: ICD-10-CM

## 2023-07-07 DIAGNOSIS — F33.1 MAJOR DEPRESSIVE DISORDER, RECURRENT, MODERATE (H): ICD-10-CM

## 2023-07-07 PROCEDURE — 99214 OFFICE O/P EST MOD 30 MIN: CPT | Performed by: STUDENT IN AN ORGANIZED HEALTH CARE EDUCATION/TRAINING PROGRAM

## 2023-07-07 RX ORDER — ARIPIPRAZOLE 2 MG/1
2 TABLET ORAL AT BEDTIME
Qty: 60 TABLET | Refills: 2 | Status: SHIPPED | OUTPATIENT
Start: 2023-07-07 | End: 2024-01-02

## 2023-07-07 RX ORDER — DOXYCYCLINE HYCLATE 100 MG
TABLET ORAL
COMMUNITY
Start: 2023-04-09 | End: 2023-07-07

## 2023-07-07 RX ORDER — FINASTERIDE 1 MG/1
1 TABLET, FILM COATED ORAL
COMMUNITY
Start: 2023-06-12

## 2023-07-07 RX ORDER — HYDROXYZINE HYDROCHLORIDE 25 MG/1
25-50 TABLET, FILM COATED ORAL 3 TIMES DAILY PRN
Qty: 60 TABLET | Refills: 2 | Status: SHIPPED | OUTPATIENT
Start: 2023-07-07

## 2023-07-07 NOTE — PROGRESS NOTES
Assessment & Plan     Severe episode of recurrent major depressive disorder, without psychotic features (H)  Major depressive disorder, recurrent, moderate (H)  Recent severe episode with identified, stable to improved on current medications and is following with that sounds like an IOP type group through the [facility].  Encouraged deferring any further medication changes at this point given symptoms are stable to improved.  Patient has an upcoming visit with psych consult and can discuss plan looking forward at that time.  If he continues to be stable I am fine with taking over prescribing  - ARIPiprazole (ABILIFY) 2 MG tablet  Dispense: 60 tablet; Refill: 2 --instructed to take at night to help with sleep  -Continue Wellbutrin daily  -Plan Labs in September to October including lipid panel and A1c for med monitoring on aripiprazole    Anxiety  Suspect recent chest pain was related to anxiety/panic symptoms.  Now resolved.  Hydroxyzine effective as a prn   - hydrOXYzine (ATARAX) 25 MG tablet  Dispense: 60 tablet; Refill: 2      Prescription drug management  15 minutes spent by me on the date of the encounter doing chart review, history and exam, documentation and further activities per the note     Return in about 2 months (around 9/7/2023) for Follow up, with me -- due for 6 mo follow up pap .    Ilene Manzo [facility] LOLI Godwin is a 28 year old, presenting for the following health issues:  Follow Up (Pt is here to follow up on Wellbutrin and Abilify medications. Pt reports seeing a psychiatrist. ) and LAB REQUEST (Lab requested for cholesterol. Pt reports he was supposed to do it during the last visit.)        5/11/2023     3:48 PM   Additional Questions   Roomed by glenn   Accompanied by self     HPI     Depression Followup    How are you doing with your depression since your last visit?  Stable to improved    Started [facility]   Didn't get great vibes  "from person working with - 2 in person 8 virtual   Morning after first session called [facility] >> started there instead, this is 3rd week   Going really good. Everyone in person, facilitator and group both great.     Sober now for 3 weeks -- longest in 3 years     Chest pain --seen in ED with negative work-up.  Probably was anxiety. Way better since switching to [facility].     Med plan per Psych last visit:   MEDICATIONS:   -continue BUPROPION XL 300mg daily  -continue ARIPIPRAZOLE 2mg daily  -start NALTREXONE 50mg daily   -restart HYDROXYZINE (VISTARIL) 25-50mg nightly if needed     Social History     Tobacco Use     Smoking status: Former     Types: Other     Passive exposure: Past     Smokeless tobacco: Never   Vaping Use     Vaping Use: Every day     Substances: Nicotine, Flavoring     Devices: [employer]   Substance Use Topics     Alcohol use: Yes     Comment: 7 drink per week     Drug use: Never         6/5/2023     8:31 AM 6/13/2023    10:01 AM 7/13/2023    11:24 AM   PHQ   PHQ-9 Total Score 14 11    11 10   Q9: Thoughts of better off dead/self-harm past 2 weeks More than half the days Several days    Several days Not at all   F/U: Thoughts of suicide or self-harm  Yes    Yes    F/U: Self harm-plan  No    No    F/U: Self-harm action  No    No    F/U: Safety concerns  No    No          2/28/2023     3:49 PM 3/6/2023     4:10 PM 6/5/2023     8:31 AM   ROWDY-7 SCORE   Total Score 4 3 2         Review of Systems   Pertinent positives and negatives per HPI.        Objective    /80   Pulse 76   Temp 98.1  F (36.7  C) (Oral)   Ht 1.651 m (5' 5\")   Wt 91.9 kg (202 lb 8 oz)   SpO2 96%   BMI 33.70 kg/m    Body mass index is 33.7 kg/m .  Physical Exam   GENERAL: alert, cooperative, in no acute distress  HEENT: sclera clear  PULM: normal respiratory effort   NEURO: alert and oriented, grossly intact, moves all extremities, normal gait   SKIN: no rashes or lesions visualized   PSYCH: euthymic affect, " good eye contact, engaged

## 2023-07-10 NOTE — PROGRESS NOTES
Behavioral Health Progress Note    Client's Legal Name: Tato Adler    Client's Preferred Name: Tato  YOB: 1995  Type of Service: video, counseling   Length of Service:   Start time: 8:20AM   End time: 9:00AM   Duration:  40 minutes  Attendees: patient    Identifying Information and Presenting Problem:  Tato is a 28 year old adult being seen for problematic symptoms of depression. Additionally, he has identified an increased pattern of alcohol use in the past 2 years and expresses concern about this pattern.     Treatment Objective(s) Addressed in This Session:  Depressed Mood: Decrease frequency and intensity of feeling down, depressed, hopeless    Progress on / Status of Treatment Objective(s) / Homework:  Satisfactory Progress      Mental Health Screening Questionnaires:  PHQ-9:       5/25/2023     8:57 AM 6/5/2023     8:31 AM 6/13/2023    10:01 AM   PHQ   PHQ-9 Total Score 14    14 14 11    11   Q9: Thoughts of better off dead/self-harm past 2 weeks More than half the days    More than half the days More than half the days Several days    Several days   F/U: Thoughts of suicide or self-harm Yes    Yes  Yes    Yes   F/U: Self harm-plan No    No  No    No   F/U: Self-harm action No    No  No    No   F/U: Safety concerns No    No  No    No      ROWDY-7:       2/28/2023     3:49 PM 3/6/2023     4:10 PM 6/5/2023     8:31 AM   ROWDY-7 SCORE   Total Score 4 3 2       Topics Discussed/Interventions Provided:    Alcohol use/treatment: continues to engage in [facility], which has been very supportive. Continues to be challenging to stay sober. Normalized experience. Discussed how to spend time on evenings when he does not have group.     Barron setting/ support: has shared with a few close friends. Two have been very supportive and one less so. Processed emotions associated with their support and praised for sharing. Explored pros and cons of sharing with his mom. Identified  "fears of judgment and her responding emotionally. Identified benefits of it potentially deepening the relationship. Explored possible resources, such as doing family session through [facility]. Encouraged boundary setting and reflected that it is not Tato's responsibility to protect/manage mom's emotions.     Assessment:   The patient appeared to be active and engaged in today's session and was receptive to feedback.     Mental Status:   During interaction with the examiner today, Tato was cooperative, open, engaged, and tearful. Patient was generally alert and oriented to person, place, time, and situation. They were casually dressed, appropriately groomed and appeared stated age. Patient's attire was appropriate for the weather and occasion. Eye contact was good. Psychomotor functioning: normal or unremarkable. Speech was normal limits tone, rate, volume; largely coherent and relevant to topic. Mood was \"pretty good\"; affect was full range and appropriate. Thought processes were unremarkable. Thought content was notable for passive suicidal ideation, without any intent, urges, or planning, and contained no evidence of psychosis.  Memory appeared grossly intact without being formally evaluated. Insight: good. Judgment was good. Patient exhibited good impulse control during the appointment.    Does the patient appear to be at imminent risk of harm to self/others at this time? No    The session was necessary to address symptoms of trauma and low mood that have been interfering with patient's ability to function in areas related to interacting and relating with others, maintaining personal health and physical well-being and participating in meaningful activities. Ongoing psychotherapy is necessary to provide counseling.    DSM-5 Diagnosis:  Major Depressive Disorder, Recurrent, Moderate   Other Stressor or Trauma-Related Disorder   R/o Alcohol Use Disorder     Plan:  1. Follow up with this provider on " 7/25  2. Patient attending [facility] IOP at [facility]   3. Patient has f/u psychiatry appointment on 7/13 through [facility]   4. Continue to support patient connecting to long term therapist   5.  After Visit Summary will be viewed in Bonny BLANCO, PhD, LP     NOTE: Treatment plan update due 7/31/23.  Diagnostic assessment update due 5/17/2024.

## 2023-07-11 ENCOUNTER — OFFICE VISIT (OUTPATIENT)
Dept: PSYCHOLOGY | Facility: CLINIC | Age: 28
End: 2023-07-11
Payer: COMMERCIAL

## 2023-07-11 DIAGNOSIS — F33.1 MAJOR DEPRESSIVE DISORDER, RECURRENT, MODERATE (H): Primary | ICD-10-CM

## 2023-07-11 PROCEDURE — 90834 PSYTX W PT 45 MINUTES: CPT | Mod: 95 | Performed by: PSYCHOLOGIST

## 2023-07-13 ENCOUNTER — VIRTUAL VISIT (OUTPATIENT)
Dept: PSYCHIATRY | Facility: CLINIC | Age: 28
End: 2023-07-13
Payer: COMMERCIAL

## 2023-07-13 DIAGNOSIS — F10.90 ALCOHOL USE DISORDER: ICD-10-CM

## 2023-07-13 DIAGNOSIS — F33.1 MAJOR DEPRESSIVE DISORDER, RECURRENT, MODERATE (H): Primary | ICD-10-CM

## 2023-07-13 PROCEDURE — 99214 OFFICE O/P EST MOD 30 MIN: CPT | Mod: VID | Performed by: STUDENT IN AN ORGANIZED HEALTH CARE EDUCATION/TRAINING PROGRAM

## 2023-07-13 RX ORDER — ESCITALOPRAM OXALATE 5 MG/1
TABLET ORAL
Qty: 34 TABLET | Refills: 0 | Status: SHIPPED | OUTPATIENT
Start: 2023-07-13 | End: 2024-01-02

## 2023-07-13 ASSESSMENT — PATIENT HEALTH QUESTIONNAIRE - PHQ9
SUM OF ALL RESPONSES TO PHQ QUESTIONS 1-9: 10
SUM OF ALL RESPONSES TO PHQ QUESTIONS 1-9: 10
10. IF YOU CHECKED OFF ANY PROBLEMS, HOW DIFFICULT HAVE THESE PROBLEMS MADE IT FOR YOU TO DO YOUR WORK, TAKE CARE OF THINGS AT HOME, OR GET ALONG WITH OTHER PEOPLE: SOMEWHAT DIFFICULT

## 2023-07-13 NOTE — PATIENT INSTRUCTIONS
Eliel Godwin,    Thank you for our time together today in Collaborative Care Psychiatry Service (CCPS). CCPS provides brief psychiatric medication stabilization to patients referred by their Primary Care Providers. Patients are typically seen in CCPS for up to 4 appointments and then referred back to the PCP for ongoing refills unless longer term medication management by a specialist is indicated. If I believe you will benefit from long-term psychiatric care I will discuss this with you. If you are interested in seeing a psychiatrist or psychiatric nurse practitioner long-term, please send me a message in DelaGet so we can refer you appropriately.     Treatment Plan:  -continue BUPROPION XL 300mg daily  -continue ARIPIPRAZOLE 2mg daily  -start ESCITALOPRAM 2.5mg at bedtime x 1 week then increase to 5mg at bedtime   -continue NALTREXONE 50mg daily   -continue HYDROXYZINE (VISTARIL) 25-50mg nightly if needed   Follow up Aug 10th @ 12pm or sooner if needed.  You can call [phone] to make appointments, leave a message with our nursing team, and inquire about any mental health referrals I have placed.  Please call your pharmacy to request a refill of your medicines listed above if needed between appointments.     MARILYN Noland  Collaborative Care Psychiatry Service  [facility]   Patient Education   Collaborative Care Psychiatry Service  What to Expect  Here's what to expect from your Collaborative Care Psychiatry Service (CCPS).   About CCPS  CCPS means 2 people work together to help you get better. You'll meet with a behavioral health clinician and a psychiatric doctor. A behavioral health clinician helps people with mental health problems by talking with them. A psychiatric doctor helps people by giving them medicine.  How it works  At every visit, you'll see the behavioral health clinician (BHC) first. They'll talk with you about how you're doing and teach you how to feel better.   Then you'll see the  "psychiatric doctor. This doctor can help you deal with troubling thoughts and feelings by giving you medicine. They'll make sure you know the plan for your care.   CCPS usually takes 3 to 6 visits. If you need more visits, we may have you start seeing a different psychiatric doctor for ongoing care.  If you have any questions or concerns, we'll be glad to talk with you.  About visits  Be open  At your visits, please talk openly about your problems. It may feel hard, but it's the best way for us to help you.  Cancelling visits  If you can't come to your visit, please call us right away at [phone]. If you don't cancel at least 24 hours (1 full day) before your visit, that's \"late cancellation.\"  Being late to visits  Being very late is the same as not showing up. You will be a \"no show\" if:  Your appointment starts with a [facility], and you're more than 15 minutes late for a 30-minute (half hour) visit. This will also cancel your appointment with the psychiatric doctor.  Your appointment is with a psychiatric doctor only, and you're more than 15 minutes late for a 30-minute (half hour) visit.  Your appointment is with a psychiatric doctor only, and you're more than 30 minutes late for a 60-minute (full hour) visit.  If you cancel late or don't show up 2 times within 6 months, we may end your care.   Getting help between visits  If you need help between visits, you can call us Monday to Friday from 8 a.m. to 4:30 p.m. at [phone].  Emergency care  Call 911 or go to the nearest emergency department if your life or someone else's life is in danger.  Call 988 anytime to reach the national Suicide and Crisis hotline.  Medicine refills  To refill your medicine, call your pharmacy. You can also call [facility]'s Behavioral Access at [phone], Monday to Friday, 8 a.m. to 4:30 p.m. It can take 1 to 3 business days to get a refill.   Forms, letters, and tests  You may have papers to fill out, like FMLA, " short-term disability, and workability. We can help you with these forms at your visits, but you must have an appointment. You may need more than 1 visit for this, to be in an intensive therapy program, or both.  Before we can give you medicine for ADHD, we may refer you to get tested for it or confirm it another way.  We may not be able to give you an emotional support animal letter.  We don't do mental health checks ordered by the court.   We don't do mental health testing, but we can refer you to get tested.   Thank you for choosing us for your care.  For informational purposes only. Not to replace the advice of your health care provider. Copyright   2022 [facility]. All rights reserved. Adenios [id number] - 12/22.

## 2023-07-13 NOTE — PROGRESS NOTES
Virtual Visit Details    Type of service:  Video Visit     ADMINISTRATIVE BILLING:    Time spent interviewing patient, reviewing referral documents, obtaining and reviewing outside records, communication with other health specialists, and preparing this report on today's date  Video start: 1136  Video stop: 1210    Total time: 39 mins      formerly [facility] PSYCHIATRIC SERVICE FOLLOW UP     Name:  Tato Adler  : 1995     Telemedicine Visit: The patient's condition can be safely assessed and treated via synchronous audio and visual telemedicine encounter.      Reason for Telemedicine Visit: COVID 19 pandemic and the social and physical recommendations by the CDC and MD.      Originating Site (Patient Location): Patient's home     Distant Site (Provider Location): Provider Remote Setting     Consent:  The patient/guardian has verbally consented to: the potential risks and benefits of telemedicine (video visit or phone) versus in person care; bill my insurance or make self-payment for services provided; and responsibility for payment of non-covered services.     Mode of Communication:  ColoraderdamÂ® video platform      As the provider I attest to compliance with applicable laws and regulations related to telemedicine.    IDENTIFICATION     Patient is a 28 year old year old White, adult  who presents for follow-up medication management with CCPS.  Patient was initially referred by their PCP. Patient attended the session alone.   The [facility] psychiat[facility] providers act as a specialty service for Primary Care Providers in the [facility] who seek to optimize medications for unstable patients.  Once medications have been optimized, [facility] providers discharge the patient back to the referring Primary Care Provider for ongoing medication management.  This type of system allows [facility] to serve a high volume of patients.      Patient care team: Patient Care Team:  Ilene Manzo DO as PCP - General  "(Family Medicine)  Lauren Hickman MD as Intern (Student in organized health care education/training program)  Ilene Manzo DO as Assigned PCP  Virginia Tavarez NP as Assigned Neuroscience Provider    INTERIM HISTORY   [facility]. At that time, the plan included   -continue BUPROPION XL 300mg daily  -continue ARIPIPRAZOLE 2mg daily  -start NALTREXONE 50mg daily   -restart HYDROXYZINE (VISTARIL) 25-50mg nightly if needed .    Interim pt communication:  none    Available records were reviewed prior to visit.    HISTORY OF PRESENT ILLNESS     Currently: Pt switched from [facility] to [facility] for IOP about 3 weeks ago. Pt says the program has been going \"really well.\" He is liking the groups and appreciates the LGBT focus of [facility]. His end date is in November. He didn't like the vibe of [facility].     Cravings for ETOH have gone down since starting naltrexone. Pt has not had a drink for 3+ weeks. He has been able to keep alcohol out of the house and is feeling more confident in his ability to distract himself. He isn't sure he wants to remain abstinent for the rest of his life. \"I feel like I'm leaving a part of me behind. And that probably says something in itself.\"     Mood/anxiety: Pt rates depression at 6/10 if 10 is worst. His ideal is 4/10. \"I would take a 4.\"  Suicidal ideation:  No   PHQ9 score is 10 indicating moderate depression.  GAD7 score is 1 indicating subclinical anxiety    Sleep: Sleep is improving. It's hard to fall asleep. \"I think its a me thing. My body is definitely tired at night. I just get... I don't know.\"     Medical: No new medical conditions    SUBSTANCE USE HISTORY    Tobacco use: denies  Caffeine:  Yes  2+ cups/day of coffee  Current alcohol:  No drink in 3+ weeks.  Current substance use: denies  Past use alcohol/substance use: denies    MEDICATIONS                                                                                      " "        Current medications reviewed today and are noted below.   Current psychotropic medications:   BUPROPION XL 300mg daily  ARIPIPRAZOLE 2mg daily  NALTREXONE 50mg daily   HYDROXYZINE (VISTARIL) 25-50mg nightly if needed     Past psychotropic medications:  Citalopram - \"I don't even think I gave it a chance\"  Hydroxyzine  Sertraline - historically helpful in undergrad; \"I remember it really changing things around\" then but not effective this time     Supplements:   See below       Not reviewed    Current Outpatient Medications   Medication Sig     aluminum chloride (DRYSOL) 20 % external solution Apply topically At Bedtime     aluminum chloride (DRYSOL) 20 % external solution      ARIPiprazole (ABILIFY) 2 MG tablet Take 1 tablet (2 mg) by mouth At Bedtime For depression     buPROPion (WELLBUTRIN XL) 300 MG 24 hr tablet Take 1 tablet (300 mg) by mouth every morning     estradiol (VAGIFEM) 10 MCG TABS vaginal tablet Place one tablet vaginally at night for 7-14 days prior to pap smear (Patient not taking: Reported on 5/17/2023)     finasteride (PROPECIA) 1 MG tablet Take 1 tablet by mouth daily at 2 pm     hydrOXYzine (ATARAX) 25 MG tablet Take 1-2 tablets (25-50 mg) by mouth 3 times daily as needed for anxiety     Multiple Vitamin (DAILY VITAMINS) TABS      naltrexone (DEPADE/REVIA) 50 MG tablet Take 1 tablet (50 mg) by mouth daily     testosterone cypionate (DEPOTESTOSTERONE) 200 MG/ML injection Inject 0.25 mLs (50 mg) into the muscle once a week     No current facility-administered medications for this visit.        VITALS   There were no vitals taken for this visit.    Pulse Readings from Last 5 Encounters:   07/07/23 76   06/15/23 68   05/11/23 111   04/28/23 98   02/28/23 100     Wt Readings from Last 5 Encounters:   07/07/23 91.9 kg (202 lb 8 oz)   06/15/23 88.5 kg (195 lb)   05/11/23 91.6 kg (202 lb)   04/28/23 91.8 kg (202 lb 6.4 oz)   02/28/23 91.7 kg (202 lb 3.2 oz)     BP Readings from Last 5 " "Encounters:   07/07/23 119/80   06/15/23 136/87   05/11/23 129/87   04/28/23 125/84   02/28/23 115/81       LABS & IMAGING                                                                                                                Recent available labs reviewed today.    Recent Labs   Lab Test 06/15/23  1703 06/01/23  1219 04/20/22  0953   CR 0.98 0.94 0.78   GFRESTIMATED  --   --  >90     Recent Labs   Lab Test 06/15/23  1703 06/01/23  1219   AST 33 29   ALT 40 32   ALKPHOS 79 77     Recent Labs   Lab Test 06/01/23  1219   TSH 1.53       ALLERGY & IMMUNIZATIONS     No Known Allergies    MEDICAL & SURGICAL HISTORY    Reviewed past medical and surgical history today.   Pregnant - NA.     Past Medical History:   Diagnosis Date     Depressive disorder      Hyperlipidemia        MENTAL STATUS EXAM:     Alertness: alert  and oriented  Appearance: adequately groomed  Behavior/Demeanor: cooperative, pleasant and calm, with good eye contact   Speech: normal and regular rate and rhythm  Language: intact and no problems  Psychomotor: normal or unremarkable  Mood: \"ok\"  Affect: full-range and appropriate; was congruent to mood; was congruent to content  Thought Process/Associations: unremarkable  Thought Content:  Reports SI thoughts without plan/intent;  Denies violent ideation and delusions  Perception:  Reports none;  Denies auditory hallucinations and visual hallucinations  Insight: intact  Judgment: intact  Cognition: does  appear grossly intact; formal cognitive testing was not done  Gait and Station: KEVIN     RISK AND PROTECTIVE FACTORS     Static Risk Factors: sex and history of MH diagnoses and/or treatment  Firearms/Weapons Access: No: Patient denies  Dynamic Risk Factors: emotional distress, substance use, anxiety, mental health stigma and feelings of shame     Protective Factors: hope for the future, compliance with medication, problem solving ability, future oriented, restricted access to means, engaged in EBT, " resilience, perceived internal locus of control, social support, access to care as needed, motivation and readiness for change, reasons for living and displaying help seeking behavior     SAFETY ASSESSMENT      Based on review of above risk and protective factors and today's exam, pt is at low acute risk of harm to self or others and chronic elevated risk due to history and risk factors. He does not meet criteria for a 72 hr hold and remains appropriate for ongoing outpatient care. The patient endorses morbid ideation without plan/intent. There was no deceit detected, and the patient presented in a manner that was believable. Local community safety resources printed and reviewed for patient to use if needed.     Recommended that patient call 911 or go to the local ED should there be a change in any of these risk factors.     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     DIFFERENTIAL DIAGNOSIS: NA     Medical comorbidities impacting or contributing to clinical picture: None noted  Known issue that I take into account for their medical decisions, no current exacerbations or new concerns.     ASSESSMENT AND PLAN       ASSESSMENT:  Tato Adler is a 28 year old White, adult who presents for initial visit with Collaborative Care Psychiatry Service (CCPS) for medication management.   13 Jun 23: Pt is a transgender FtM with depression and alcohol abuse who returns to clinic after starting aripiprazole reporting improving depressive sx and a reduction in intensity and frequency of SI thoughts. He is starting a dual dx IOP at [facility] and is feeling nervous but hopeful as he stopped drinking two days ago. He is reporting strong cravings for etoh and we discussed r/b/se of naltrexone  initiation for etoh cravings and relapse prevention; pt agrees to this. Recommended no other medicine changes as I suspect abstinence and IOP will provide support for pt's current depressive sx as well. He agrees to this plan and will rtc in 6 weeks if not seeing psychiatry through IOP, otherwise will rtc in 10 weeks.   Today 13 July 23:  Pt is a transgender FtM with depression and AUD in early remission who returns to clinic involved in an IOP at [facility] reporting ongoing depression in spite of sobriety the past few weeks. Pt is tolerating current medicines without problems but wonders about med adjustments to support his depression. We discussed r/b/se of increasing aripiprazole for mood augmentation vs adding SSRI for depressive sx. Pt is most interested in revisiting SSRI based on his hx of good response with Sertraline (Zoloft) when he wasn't drinking. He is open to trial of other SSRI at this time and we discussed escitalopram for mood and for sleep. He agrees to trial. Discussed my upcoming transition out of [place] and recommendation to return to PCP after next visit. We will discuss dispo further in August but I do think pt would be able to return to PCP. No safety concerns today.      TREATMENT PLAN: Medication side effects and alternatives reviewed. Health promotion activities recommended and reviewed. All questions addressed. Education and counseling completed regarding risks and benefits of medications and psychotherapy options. Collaborative Care Psychiatry Service model reviewed today. Recommend therapy for additional support. Safety plan reviewed as indicated.     MEDICATIONS:   -continue BUPROPION XL 300mg daily  -continue ARIPIPRAZOLE 2mg daily  -start ESCITALOPRAM 2.5mg at bedtime x 1 week then increase to 5mg at bedtime   -continue NALTREXONE 50mg daily   -continue HYDROXYZINE (VISTARIL) 25-50mg nightly if needed      LABS/RADS:   -Last Labs Obtained: 6/1/23, reviewed      PATIENT  STATUS:  [facility] providers offer care a specialty service for Primary Care Providers in the [facility] that seek to optimize psychotropic medications for unstable patients.  Once medications have been optimized, our providers discharge the patient back to the referring Primary Care Provider for ongoing medication management.  This type of system allows our providers to serve a high volume of patients.   -Pt will be seen for continued medication stabilization in [facility].    PSYCHOSOCIAL:   -continue [facility] IOP  -continue with Dr. Caruso  -Follow up with primary care provider as planned or for acute medical concerns.    PSYCHOEDUCATION:  Medication side effects and alternatives reviewed. Health promotion activities recommended and reviewed today. All questions addressed. Education and counseling completed regarding risks and benefits of medications and psychotherapy options.  Consent provided by patient/guardian  Call the psychiatric nurse line with medication questions or concerns at [phone].  Agendizehart may be used to communicate with your provider, but this is not intended to be used for emergencies.  BLACK BOX WARNING: Discussed the Food and Drug Administration (FDA) requires that all antidepressants carry a warning that some children, adolescents and young adults may be at increased risk of suicide when taking antidepressants. Anyone taking an antidepressant should be watched closely for worsening depression or unusual behavior especially in the first few weeks after starting an SSRI. Keep in mind, antidepressants are more likely to reduce suicide risk in the long run by improving mood.   HARM REDUCTION:  Discussions regarding effects of mood altering substances, alcohol and cannabis, on mood and that approach is harm reduction, will continue to prescribe meds as they work to cut back use.    FIRST GENERATION ANTIPSYCHOTIC/ SECOND GENERATION ANTIPSYCHOTIC USE:  Atypical need for cardiometabolic  monitoring with medication- B/P, weight, blood sugar, cholesterol.  Need to monitor for abnormal movements taught  Medlineplus.gov is information for patients.  It is run by the [employer] and it contains information about all disorders, diseases and all medications.      FOLLOW-UP: Follow up in 4 weeks    1. Continue all other treatments (including medications) per primary care provider and/or specialists.   2. To schedule individual or family therapy, call [facility] at [phone].   3. Follow up with primary care provider as planned or for acute medical concerns.  4. Call the psychiatric nurse line with medication questions or concerns at [phone] or [phone].  5. Content Fleet may be used to communicate with your care team, but this is not intended to be used for emergencies.    CRISIS RESOURCES:    1. Present to the Emergency Department as needed or call after hours crisis line at [phone] or [phone].   2. [place] Crisis Text Line: Text [place] to 680641.  3. Suicide LifeLine Chat: suicidepreventionlifeline.org/chat/.  4. National Suicide Prevention Lifeline: 589.749.7505 (TTY: 740.393.6201). Call anytime for help.  (www.suicidepreventionlifeline.org)  5. National [place] on Mental Illness (www.abena.org): 496.975.5244 or 554-502-4875.  6. Mental Health Association (www.mentalhealth.org): 701.323.2710 or 567-600-0166.      Signed:   Fransico Tavarez DNP, PMPRISCILLAP-BC  Collaborative Care Psychiatry Service (CCPS)

## 2023-07-13 NOTE — NURSING NOTE
Is the patient currently in the state of [place]? YES    Visit mode:VIDEO    If the visit is dropped, the patient can be reconnected by: VIDEO VISIT: Send to e-mail at: [email or web address]    Will anyone else be joining the visit? NO      How would you like to obtain your AVS? MyChart    Are changes needed to the allergy or medication list? NO    Reason for visit: RECHECK

## 2023-07-18 NOTE — PATIENT INSTRUCTIONS
Patient Education   Here is the plan from today's visit    1. Severe episode of recurrent major depressive disorder, without psychotic features (H)  2. Major depressive disorder, recurrent, moderate (H)  No med changes for now. See what Psych thinks at your next visit if they want to keep prescribing, transfer to me, when they'd consider any med changes, etc   - ARIPiprazole (ABILIFY) 2 MG tablet; Take 1 tablet (2 mg) by mouth At Bedtime For depression  Dispense: 60 tablet; Refill: 2    3. Anxiety  - hydrOXYzine (ATARAX) 25 MG tablet; Take 1-2 tablets (25-50 mg) by mouth 3 times daily as needed for anxiety  Dispense: 60 tablet; Refill: 2       Please call or return to clinic if your symptoms don't go away.    Follow up plan  Return in about 2 months (around 9/7/2023) for Follow up, with me -- due for 6 mo follow up pap .    Thank you for coming to [facility] today.  Lab Testing:  **If you had lab testing today and your results are reassuring or normal they will be mailed to you or sent through TransferWise within 7 days.   **If the lab tests need quick action we will call you with the results.  **If you are having labs done on a different day, please call [phone] to schedule at [facility] or [phone] for other [facility] Outpatient Lab locations. Labs do not offer walk-in appointments.  The phone number we will call with results is # [phone] (home) . If this is not the best number please call our clinic and change the number.  Medication Refills:  If you need any refills please call your pharmacy and they will contact us.   If you need to  your refill at a new pharmacy, please contact the new pharmacy directly. The new pharmacy will help you get your medications transferred faster.   Scheduling:  If you have any concerns about today's visit or wish to schedule another appointment please call our office during normal business hours [phone] (8-5:00 M-F). If you can no longer make a  scheduled visit, please cancel via MinuteBuzz or call us to cancel.   If a referral was made to an [facility] specialty provider and you do not get a call from central scheduling, please refer to directions on your visit summary or call our office during normal business hours for assistance.   If a Mammogram was ordered for you at the Breast Center call [phone] to schedule or change your appointment.  If you had an XRay/CT/Ultrasound/MRI ordered the number is [phone] to schedule or change your radiology appointment.   [facility] has limited ultrasound appointments available on Wednesdays, if you would like your ultrasound at [facility], please call [phone] to schedule.   Medical Concerns:  If you have urgent medical concerns please call [phone] at any time of the day.    Ilene Manzo,

## 2023-07-24 NOTE — PROGRESS NOTES
Behavioral Health Progress Note    Client's Legal Name: Tato Adler    Client's Preferred Name: Tato  YOB: 1995  Type of Service: video, counseling   Length of Service:   Start time: 8:20AM   End time: 9:00AM   Duration:  40 minutes  Attendees: patient    Identifying Information and Presenting Problem:  Tato is a 28 year old adult being seen for problematic symptoms of depression and a pattern of problematic alcohol use. He is currently engaged in BOY IOP through [facility].     Treatment Objective(s) Addressed in This Session:  Depressed Mood: Decrease frequency and intensity of feeling down, depressed, hopeless    Progress on / Status of Treatment Objective(s) / Homework:  Satisfactory Progress      Mental Health Screening Questionnaires:  PHQ-9:       6/5/2023     8:31 AM 6/13/2023    10:01 AM 7/13/2023    11:24 AM   PHQ   PHQ-9 Total Score 14 11    11 10   Q9: Thoughts of better off dead/self-harm past 2 weeks More than half the days Several days    Several days Not at all   F/U: Thoughts of suicide or self-harm  Yes    Yes    F/U: Self harm-plan  No    No    F/U: Self-harm action  No    No    F/U: Safety concerns  No    No       ROWDY-7:       2/28/2023     3:49 PM 3/6/2023     4:10 PM 6/5/2023     8:31 AM   ROWDY-7 SCORE   Total Score 4 3 2       Topics Discussed/Interventions Provided:    Termination/therapy transition: patient has not yet called therapy referrals. Processed what he is looking for in a therapist and timing of connecting with one. Discussed goal of calling some resources from list by next visit with this provider (7/31). Processed therapeutic work, including process and timeline of connecting to BOY treatment. Patient expressed some frustration with himself for not pursuing BOY treatment sooner and wishing it had happened sooner but also acknowledging that he would not have been open to it sooner. Validated this experience and praised for courage to share  "more over time with provider.     Relational processing: processed patient's experience of receiving feedback from group members at [facility], identifying that he feels emotional hearing positive feedback because it reminds him of lack of this affirmation from his own family. Shared experience of spending time with his friend and his friend's parents, which feels nice but also reminds him of contrast to how his own parents interact with him. Empathized and validated experiences. Reflected ways that patient has made these points of connection possible through his vulnerability. He reflected ways this does not feel possible with his mother because she will make it about herself.     Assessment:   The patient appeared to be active and engaged in today's session and was receptive to feedback.     Mental Status:   During interaction with the examiner today, Godwin was cooperative, open, engaged, and tearful. Patient was generally alert and oriented to person, place, time, and situation. They were casually dressed, appropriately groomed and appeared stated age. Patient's attire was appropriate for the weather and occasion. Eye contact was good. Psychomotor functioning: normal or unremarkable. Speech was normal limits tone, rate, volume; largely coherent and relevant to topic. Mood was \"okay\"; affect was full range and appropriate. Thought processes were unremarkable. Thought content was not notable for passive suicidal ideation, intent, urges, or planning, and contained no evidence of psychosis.  Memory appeared grossly intact without being formally evaluated. Insight: good. Judgment was good. Patient exhibited good impulse control during the appointment.    Does the patient appear to be at imminent risk of harm to self/others at this time? No    The session was necessary to address symptoms of trauma and low mood that have been interfering with patient's ability to function in areas related to interacting and relating with " others, maintaining personal health and physical well-being and participating in meaningful activities. Ongoing psychotherapy is necessary to provide counseling.    DSM-5 Diagnosis:  Major Depressive Disorder, Recurrent, Moderate   Other Stressor or Trauma-Related Disorder   R/o Alcohol Use Disorder     Plan:  1. Follow up with this provider on 7/31, 8/15/ and 8/22 (final session with this provider)   2. Patient attending BO[facility] IOP at [facility] (20 weeks program, ends ~November)   3. Patient [facility]   4. Continue to support patient connecting to long term therapist   5.  After Visit Summary will be viewed in [email or web address]     REECE BLANCO, PhD, LP     NOTE: Treatment plan update due 7/31/23.  Diagnostic assessment update due 5/17/2024.

## 2023-07-25 ENCOUNTER — OFFICE VISIT (OUTPATIENT)
Dept: PSYCHOLOGY | Facility: CLINIC | Age: 28
End: 2023-07-25
Payer: COMMERCIAL

## 2023-07-25 DIAGNOSIS — F43.10 PTSD (POST-TRAUMATIC STRESS DISORDER): ICD-10-CM

## 2023-07-25 DIAGNOSIS — F33.1 MAJOR DEPRESSIVE DISORDER, RECURRENT, MODERATE (H): Primary | ICD-10-CM

## 2023-07-25 PROCEDURE — 90834 PSYTX W PT 45 MINUTES: CPT | Performed by: PSYCHOLOGIST

## 2023-08-10 ENCOUNTER — VIRTUAL VISIT (OUTPATIENT)
Dept: PSYCHIATRY | Facility: CLINIC | Age: 28
End: 2023-08-10
Payer: COMMERCIAL

## 2023-08-10 DIAGNOSIS — F33.1 MAJOR DEPRESSIVE DISORDER, RECURRENT, MODERATE (H): Primary | ICD-10-CM

## 2023-08-10 DIAGNOSIS — F10.90 ALCOHOL USE DISORDER: ICD-10-CM

## 2023-08-10 PROCEDURE — 99215 OFFICE O/P EST HI 40 MIN: CPT | Mod: VID | Performed by: STUDENT IN AN ORGANIZED HEALTH CARE EDUCATION/TRAINING PROGRAM

## 2023-08-10 NOTE — PATIENT INSTRUCTIONS
Eliel Godwin,    Treatment Plan:  -continue BUPROPION XL 300mg daily  -continue ARIPIPRAZOLE 2mg daily  -continue ESCITALOPRAM 5mg at bedtime   -continue NALTREXONE 50mg daily   -continue HYDROXYZINE (VISTARIL) 25-50mg nightly if needed   [place] LGBT Therapist Network: [email or web address]  [facility]: [email or web address]  Follow up in 4-6 weeks with Vick or PCP if needed   You can call [phone] to make appointments, leave a message with our nursing team, and inquire about any mental health referrals I have placed.  Please call your pharmacy to request a refill of your medicines listed above if needed between appointments.     MARILYN Noland  Collaborative Care Psychiatry Service  [facility]   Patient Education   Collaborative Care Psychiatry Service  What to Expect  Here's what to expect from your Collaborative Care Psychiatry Service (CCPS).   About CCPS  CCPS means 2 people work together to help you get better. You'll meet with a behavioral health clinician and a psychiatric doctor. A behavioral health clinician helps people with mental health problems by talking with them. A psychiatric doctor helps people by giving them medicine.  How it works  At every visit, you'll see the behavioral health clinician (BHC) first. They'll talk with you about how you're doing and teach you how to feel better.   Then you'll see the psychiatric doctor. This doctor can help you deal with troubling thoughts and feelings by giving you medicine. They'll make sure you know the plan for your care.   CCPS usually takes 3 to 6 visits. If you need more visits, we may have you start seeing a different psychiatric doctor for ongoing care.  If you have any questions or concerns, we'll be glad to talk with you.  About visits  Be open  At your visits, please talk openly about your problems. It may feel hard, but it's the best way for us to help  "you.  Cancelling visits  If you can't come to your visit, please call us right away at [phone]. If you don't cancel at least 24 hours (1 full day) before your visit, that's \"late cancellation.\"  Being late to visits  Being very late is the same as not showing up. You will be a \"no show\" if:  Your appointment starts with a [facility], and you're more than 15 minutes late for a 30-minute (half hour) visit. This will also cancel your appointment with the psychiatric doctor.  Your appointment is with a psychiatric doctor only, and you're more than 15 minutes late for a 30-minute (half hour) visit.  Your appointment is with a psychiatric doctor only, and you're more than 30 minutes late for a 60-minute (full hour) visit.  If you cancel late or don't show up 2 times within 6 months, we may end your care.   Getting help between visits  If you need help between visits, you can call us Monday to Friday from 8 a.m. to 4:30 p.m. at [phone].  Emergency care  Call 911 or go to the nearest emergency department if your life or someone else's life is in danger.  Call 988 anytime to reach the national Suicide and Crisis hotline.  Medicine refills  To refill your medicine, call your pharmacy. You can also call [facility]'s Behavioral Access at [phone], Monday to Friday, 8 a.m. to 4:30 p.m. It can take 1 to 3 business days to get a refill.   Forms, letters, and tests  You may have papers to fill out, like FMLA, short-term disability, and workability. We can help you with these forms at your visits, but you must have an appointment. You may need more than 1 visit for this, to be in an intensive therapy program, or both.  Before we can give you medicine for ADHD, we may refer you to get tested for it or confirm it another way.  We may not be able to give you an emotional support animal letter.  We don't do mental health checks ordered by the court.   We don't do mental health testing, but we can refer you to get " tested.   Thank you for choosing us for your care.  For informational purposes only. Not to replace the advice of your health care provider. Copyright   2022 [facility]. All rights reserved. [employer] [id number] - 12/22.

## 2023-08-10 NOTE — Clinical Note
Good afternoon,  This is my last week with CCPS and my last visit with Tato. He's feeling fairly stable with current medicines and wishes to continue as is for a while before considering stopping aripiprazole as he doesn't want to take this many medications long term, which I agree with. I think stopping aripiprazole in 4-6 months if ongoing stability would be very appropriate. Provided him with the [place] LGBT Therapy network information and specific information for an LGBT Psych APRN in the community. He would like to f/up with PCP if unable to connect with specific LGBT APRN and I think this would be appropriate given his stability right now.  Let me know if any questions/concerns. Fransico Tavarez APRN Collaborative Care Psychiatry Service [facility]

## 2023-08-10 NOTE — NURSING NOTE
Is the patient currently in the state of [place]? YES    Visit mode:VIDEO    If the visit is dropped, the patient can be reconnected by: VIDEO VISIT: Send to e-mail at: [email or web address]    Will anyone else be joining the visit? NO      How would you like to obtain your AVS? MyChart    Are changes needed to the allergy or medication list? NO    Reason for visit: RECHECK

## 2023-08-10 NOTE — PROGRESS NOTES
ADMINISTRATIVE BILLING:    Time spent interviewing patient, reviewing referral documents, obtaining and reviewing outside records, communication with other health specialists, and preparing this report on today's date  Video start: 1200  Video stop: 1237    Total time: 53 mins      [facility] PSYCHIATRIC SERVICE FOLLOW UP     Name:  Tato Adler  : 1995     Telemedicine Visit: The patient's condition can be safely assessed and treated via synchronous audio and visual telemedicine encounter.      Reason for Telemedicine Visit: COVID 19 pandemic and the social and physical recommendations by the CDC and MD.     Patient location: Home address on record  Provider location: Home office  Platform: Kutenda     Consent:  The patient/guardian has verbally consented to: the potential risks and benefits of telemedicine (video visit or phone) versus in person care; bill my insurance or make self-payment for services provided; and responsibility for payment of non-covered services.     As the provider I attest to compliance with applicable laws and regulations related to telemedicine.    IDENTIFICATION     Patient is a 28 year old year old White, adult  who presents for follow-up medication management with [facility].  Patient was initially referred by their PCP. Patient attended the session alone.   The [facility] psychiatry providers act as a specialty service for Primary Care Providers in the [facility] who seek to optimize medications for unstable patients.  Once medications have been optimized, [facility] providers discharge the patient back to the referring Primary Care Provider for ongoing medication management.  This type of system allows [facility] to serve a high volume of patients.      Patient care team: Patient Care Team:  Ilene Manzo DO as PCP - General (Family Medicine)  Lauren Hickman MD as Intern (Student in organized health care education/training program)  Ilene Manzo DO  "as Assigned PCP  Virginia Tavarez NP as Assigned Neuroscience Provider    INTERIM HISTORY   Pt was last seen in [facility] for follow-up on 13 July 23. At that time, the plan included   -continue BUPROPION XL 300mg daily  -continue ARIPIPRAZOLE 2mg daily  -start ESCITALOPRAM 2.5mg at bedtime x 1 week then increase to 5mg at bedtime   -continue NALTREXONE 50mg daily   -continue HYDROXYZINE (VISTARIL) 25-50mg nightly if needed .    Interim pt communication:  none    Available records were reviewed prior to visit.    HISTORY OF PRESENT ILLNESS     Currently: \"Things are going ok. I feel pretty good most days.\"  Pt thinks he is feeling \"some type of way on the Escitalopram (lexapro). Maybe I feel better. I feel more at ease at night.\"  He is noticing that he's having an easier time doing chores and taking care of his house responsibilities.   Pt thinks naltrexone is working well for etoh cravings - he denies cravings.  [employer] group is working well. He is feeling emotionally drained with group. \"The thought of going until November makes me not super happy.\" Work is picking up and the thought of work getting better feels overwhelming.  He's contemplating dropping out of [employer] because he's constantly tired and not sure how committed he is to it.     Suicidal ideation:  No   PHQ2 score is 2 indicating minimal depression.  GAD1 score is  1 indicating subclinical anxiety    Sleep: Pt's sleep is \"better than it was before. So that's nice.\" He's going to bed before 12am which he is glad about.  Appetite: Pt's appetite is good. He wants to work on moderation. Clothes are fitting a little tighter right now.     Medical: No new medical concerns.    SUBSTANCE USE HISTORY    Tobacco use: denies  Caffeine:  Yes  2+ cups/day of coffee  Current alcohol:  early sobriety  Current substance use: denies  Past use alcohol/substance use: denies    MEDICATIONS                                                                            " "                  Current medications reviewed today and are noted below.   Current psychotropic medications:   BUPROPION XL 300mg daily  ARIPIPRAZOLE 2mg daily  ESCITALOPRAM 5mg at bedtime   NALTREXONE 50mg daily   HYDROXYZINE (VISTARIL) 25-50mg nightly if needed     Past psychotropic medications:  Citalopram - \"I don't even think I gave it a chance\"  Hydroxyzine  Sertraline - historically helpful in undergrad; \"I remember it really changing things around\" then but not effective this time     Supplements:   See below       Not reviewed    Current Outpatient Medications   Medication Sig    aluminum chloride (DRYSOL) 20 % external solution Apply topically At Bedtime    aluminum chloride (DRYSOL) 20 % external solution     ARIPiprazole (ABILIFY) 2 MG tablet Take 1 tablet (2 mg) by mouth At Bedtime For depression    buPROPion (WELLBUTRIN XL) 300 MG 24 hr tablet Take 1 tablet (300 mg) by mouth every morning    escitalopram (LEXAPRO) 5 MG tablet Take 0.5 tablets (2.5 mg) by mouth At Bedtime for 7 days, THEN 1 tablet (5 mg) At Bedtime for 30 days.    estradiol (VAGIFEM) 10 MCG TABS vaginal tablet Place one tablet vaginally at night for 7-14 days prior to pap smear (Patient not taking: Reported on 5/17/2023)    finasteride (PROPECIA) 1 MG tablet Take 1 tablet by mouth daily at 2 pm    hydrOXYzine (ATARAX) 25 MG tablet Take 1-2 tablets (25-50 mg) by mouth 3 times daily as needed for anxiety    Multiple Vitamin (DAILY VITAMINS) TABS     naltrexone (DEPADE/REVIA) 50 MG tablet Take 1 tablet (50 mg) by mouth daily    testosterone cypionate (DEPOTESTOSTERONE) 200 MG/ML injection Inject 0.25 mLs (50 mg) into the muscle once a week     No current facility-administered medications for this visit.        VITALS   There were no vitals taken for this visit.    Pulse Readings from Last 5 Encounters:   07/07/23 76   06/15/23 68   05/11/23 111   04/28/23 98   02/28/23 100     Wt Readings from Last 5 Encounters:   07/07/23 91.9 kg (202 " "lb 8 oz)   06/15/23 88.5 kg (195 lb)   05/11/23 91.6 kg (202 lb)   04/28/23 91.8 kg (202 lb 6.4 oz)   02/28/23 91.7 kg (202 lb 3.2 oz)     BP Readings from Last 5 Encounters:   07/07/23 119/80   06/15/23 136/87   05/11/23 129/87   04/28/23 125/84   02/28/23 115/81       LABS & IMAGING                                                                                                                Recent available labs reviewed today.    Recent Labs   Lab Test 06/15/23  1703 06/01/23  1219 04/20/22  0953   CR 0.98 0.94 0.78   GFRESTIMATED  --   --  >90     Recent Labs   Lab Test 06/15/23  1703 06/01/23  1219   AST 33 29   ALT 40 32   ALKPHOS 79 77     Recent Labs   Lab Test 06/01/23  1219   TSH 1.53       ALLERGY & IMMUNIZATIONS     No Known Allergies    MEDICAL & SURGICAL HISTORY    Reviewed past medical and surgical history today.   Pregnant - Yes and No: No     Past Medical History:   Diagnosis Date    Depressive disorder     Hyperlipidemia        MENTAL STATUS EXAM:     Alertness: alert  and oriented  Appearance: adequately groomed  Behavior/Demeanor: cooperative, pleasant and calm, with good eye contact   Speech: normal and regular rate and rhythm  Language: intact and no problems  Psychomotor: normal or unremarkable  Mood: \"pretty good\"  Affect: full-range and appropriate; was congruent to mood; was congruent to content  Thought Process/Associations: unremarkable  Thought Content:  Reports none;  Denies SI/HI, violent ideation and delusions  Perception:  Reports none;  Denies auditory hallucinations and visual hallucinations  Insight: intact  Judgment: intact  Cognition: does  appear grossly intact; formal cognitive testing was not done  Gait and Station: KEVIN     RISK AND PROTECTIVE FACTORS     Static Risk Factors: sex and history of MH diagnoses and/or treatment  Firearms/Weapons Access: No: Patient denies  Dynamic Risk Factors: emotional distress, substance use, anxiety, mental health stigma and feelings of " shame     Protective Factors: hope for the future, compliance with medication, problem solving ability, future oriented, restricted access to means, engaged in EBT, resilience, perceived internal locus of control, social support, access to care as needed, motivation and readiness for change, reasons for living and displaying help seeking behavior     SAFETY ASSESSMENT      Based on review of above risk and protective factors and today's exam, pt is at low acute risk of harm to self or others and chronic elevated risk due to history and risk factors. He does not meet criteria for a 72 hr hold and remains appropriate for ongoing outpatient care. The patient endorses morbid ideation without plan/intent. There was no deceit detected, and the patient presented in a manner that was believable. Local community safety resources printed and reviewed for patient to use if needed.     Recommended that patient call 911 or go to the local ED should there be a change in any of these risk factors.     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     DIFFERENTIAL DIAGNOSIS: NA     Medical comorbidities impacting or contributing to clinical picture: None noted  Known issue that I take into account for their medical decisions, no current exacerbations or new concerns.     ASSESSMENT AND PLAN       ASSESSMENT:  Tato Adler is a 28 year old White, adult who presents for initial visit with [facility] ([facility]) for medication management.   13 July 23:  Pt is a transgender FtM with depression and AUD in early remission who returns to clinic involved in an IOP at [facility] reporting ongoing depression in spite of sobriety the past few weeks. Pt is tolerating current medicines without  problems but wonders about med adjustments to support his depression. We discussed r/b/se of increasing aripiprazole for mood augmentation vs adding SSRI for depressive sx. Pt is most interested in revisiting SSRI based on his hx of good response with Sertraline (Zoloft) when he wasn't drinking. He is open to trial of other SSRI at this time and we discussed escitalopram for mood and for sleep. He agrees to trial. Discussed my upcoming transition out of [place] and recommendation to return to PCP after next visit. We will discuss dispo further in August but I do think pt would be able to return to PCP. No safety concerns today.   Today 10 Aug 23: Pt is a transgender FtM with depression and AUD in early remission who returns to clinic noticing improving depressive sx since adding escitalopram. He is tolerating current medicines without problems but does voice interest in eventual review and management of polypharmacy. We discussed likely next step of stopping aripiprazole given it is low dose and primarily adjunctive. Pt opts to continue with medications as is as he perceives mood improvement/early stability. Discussed pt's ambivalence about continuing with [facility] [employer] and recommended he explore this further with therapist and [facility] staff themselves. Recommended he have follow-ups with psychiatry and ind therapy in place before dropping out if that is his primary end goal. He agrees. We discussed referral to long term psychiatry vs return to PCP and pt would like to return to PCP if he is unable to connect with LGBT specific [facility]  - Vick Grover. I think this is reasonable as PCP would be able to manage current medicines. Pt denies safety concerns today.      TREATMENT PLAN: Medication side effects and alternatives reviewed. Health promotion activities recommended and reviewed. All questions addressed. Education and counseling completed regarding risks and benefits of medications and psychotherapy options.  [facility] Care Psychiatry Service model reviewed today. Recommend therapy for additional support. Safety plan reviewed as indicated.     MEDICATIONS:   -continue BUPROPION XL 300mg daily  -continue ARIPIPRAZOLE 2mg daily  -continue ESCITALOPRAM 5mg at bedtime   -continue NALTREXONE 50mg daily   -continue HYDROXYZINE (VISTARIL) 25-50mg nightly if needed      LABS/RADS:   -Last Labs Obtained: 6/1/23, reviewed    PATIENT STATUS:  [facility] MD/DO/NP/PA providers offer care a specialty service for Primary Care Providers in the [facility] that seek to optimize psychotropic medications for unstable patients.  Once medications have been optimized, our providers discharge the patient back to the referring Primary Care Provider for ongoing medication management.  This type of system allows our providers to serve a high volume of patients.   - directly given Vick Grover's information; will f/up with PCP if this doesn't work out    PSYCHOSOCIAL:   -continue therapy; given [facility] information  -Follow up with primary care provider as planned or for acute medical concerns.    PSYCHOEDUCATION:  Medication side effects and alternatives reviewed. Health promotion activities recommended and reviewed today. All questions addressed. Education and counseling completed regarding risks and benefits of medications and psychotherapy options.  Consent provided by patient/guardian  Call the psychiatric nurse line with medication questions or concerns at [phone].  EpiGaNhart may be used to communicate with your provider, but this is not intended to be used for emergencies.  BLACK BOX WARNING: Discussed the Food and Drug Administration (FDA) requires that all antidepressants carry a warning that some children, adolescents and young adults may be at increased risk of suicide when taking antidepressants. Anyone taking an antidepressant should be watched closely for worsening depression or unusual behavior especially in the  first few weeks after starting an SSRI. Keep in mind, antidepressants are more likely to reduce suicide risk in the long run by improving mood.   FIRST GENERATION ANTIPSYCHOTIC/ SECOND GENERATION ANTIPSYCHOTIC USE:  Atypical need for cardiometabolic monitoring with medication- B/P, weight, blood sugar, cholesterol.  Need to monitor for abnormal movements taught  Medlineplus.gov is information for patients.  It is run by the [employer] and it contains information about all disorders, diseases and all medications.      FOLLOW-UP: Follow up with provider within 6 weeks    Continue all other treatments (including medications) per primary care provider and/or specialists.   To schedule individual or family therapy, call [facility] at [phone].   Follow up with primary care provider as planned or for acute medical concerns.  Call the psychiatric nurse line with medication questions or concerns at [phone] or [phone].  AdviseHub may be used to communicate with your care team, but this is not intended to be used for emergencies.    CRISIS RESOURCES:    Present to the Emergency Department as needed or call after hours crisis line at [phone] or [phone].   [place] Crisis Text Line: Text [place] to 814589.  Suicide LifeLine Chat: suicidepreventionlifeline.org/chat/.  National Suicide Prevention Lifeline: 443.919.9028 (TTY: 578.679.1378). Call anytime for help.  (www.suicidepreventionlifeline.org)  National Prospect on Mental Illness (www.abena.org): 903.660.5405 or 270-131-1269.  Mental Health Association (www.mentalhealth.org): 439.544.2008 or 596-214-6076.      Signed:   Fransico Tavarez DNP, PMHNP-BC  [facility] ([facility])

## 2023-08-15 ENCOUNTER — OFFICE VISIT (OUTPATIENT)
Dept: PSYCHOLOGY | Facility: CLINIC | Age: 28
End: 2023-08-15
Payer: COMMERCIAL

## 2023-08-15 DIAGNOSIS — F43.10 PTSD (POST-TRAUMATIC STRESS DISORDER): ICD-10-CM

## 2023-08-15 DIAGNOSIS — F33.1 MAJOR DEPRESSIVE DISORDER, RECURRENT, MODERATE (H): Primary | ICD-10-CM

## 2023-08-15 PROCEDURE — 90834 PSYTX W PT 45 MINUTES: CPT | Performed by: PSYCHOLOGIST

## 2023-08-15 NOTE — PROGRESS NOTES
Behavioral Health Progress Note    Client's Legal Name: Tato Adler    Client's Preferred Name: Tato  YOB: 1995  Type of Service: video, counseling   Length of Service:   Start time: 8:25 AM   End time: 9:10 AM   Duration:  45 minutes  Attendees: patient    Identifying Information and Presenting Problem:  Tato is a 28 year old adult being seen for problematic symptoms of depression and a pattern of problematic alcohol use. He is currently engaged in BOY IOP through [facility].     Treatment Objective(s) Addressed in This Session:  Depressed Mood: Decrease frequency and intensity of feeling down, depressed, hopeless    Progress on / Status of Treatment Objective(s) / Homework:  Satisfactory Progress      Mental Health Screening Questionnaires:  PHQ-9:       6/5/2023     8:31 AM 6/13/2023    10:01 AM 7/13/2023    11:24 AM   PHQ   PHQ-9 Total Score 14 11    11 10   Q9: Thoughts of better off dead/self-harm past 2 weeks More than half the days Several days    Several days Not at all   F/U: Thoughts of suicide or self-harm  Yes    Yes    F/U: Self harm-plan  No    No    F/U: Self-harm action  No    No    F/U: Safety concerns  No    No       ROWDY-7:       2/28/2023     3:49 PM 3/6/2023     4:10 PM 6/5/2023     8:31 AM   ROWDY-7 SCORE   Total Score 4 3 2       Topics Discussed/Interventions Provided:    Termination/therapy transition: Tato had intake at [facility] for ongoing therapy and has first therapy appointment scheduled with an LADC (Manasa) on Sept. 1. He feels hopeful about upcoming session. Processed therapeutic work together, including decision not to pursue BOY treatment sooner. Patient expressed wondering whether this provider pushing him more to engage may have been beneficial. Validated this wish/possibility. Discussed progress throughout work together. Patient feels he has gotten more comfortable being open with provider. Identified progress in feeling more  "connectedness socially and currently engaging in BOY IOP, as well as being able to recognize accomplishments and strengths at work. Reflected how processing family relationships have changed over time and identified continued areas of work, including patient's sense of self worth and feelings of guilt about disappointing others. Provider shared location of next practice, in the event that patient should seek further care in the future. Will continue processing possibility at next session.       Assessment:   The patient appeared to be active and engaged in today's session and was receptive to feedback.     Mental Status:   During interaction with the examiner today, Godwin was cooperative, open, and engaged. Patient was generally alert and oriented to person, place, time, and situation. They were casually dressed, appropriately groomed and appeared stated age. Patient's attire was appropriate for the weather and occasion. Eye contact was good. Psychomotor functioning: normal or unremarkable. Speech was normal limits tone, rate, volume; largely coherent and relevant to topic. Mood was \"pretty good\"; affect was full range and appropriate. Thought processes were unremarkable. Thought content was not notable for passive suicidal ideation, intent, urges, or planning, and contained no evidence of psychosis.  Memory appeared grossly intact without being formally evaluated. Insight: good. Judgment was good. Patient exhibited good impulse control during the appointment.    Does the patient appear to be at imminent risk of harm to self/others at this time? No    The session was necessary to address symptoms of trauma and low mood that have been interfering with patient's ability to function in areas related to interacting and relating with others, maintaining personal health and physical well-being and participating in meaningful activities. Ongoing psychotherapy is necessary to provide counseling.    DSM-5 Diagnosis:  Major " Depressive Disorder, Recurrent, Moderate   Other Stressor or Trauma-Related Disorder   R/o Alcohol Use Disorder     Plan:  1. Follow up with this provider on 8/22 (final session with provider at clinic)   2. Patient attending BOY IOP at [facility] (20 weeks program, ends ~November)   3. Patient has therapy scheduled at [facility] with Manasa ([facility]) on 9/01/23   4. Continue to support patient connecting to long term therapist   5.  After Visit Summary will be viewed in [facility]     REECE BLANCO, PhD, LP     NOTE: Treatment plan update due next session.  Diagnostic assessment update due 5/17/2024.

## 2023-08-21 NOTE — PROGRESS NOTES
"    Behavioral Health Progress Note    Client's Legal Name: Tato Adler    Client's Preferred Name: Tato  YOB: 1995  Type of Service: video, counseling   Length of Service:   Start time: 8:20 AM   End time: 9:05 AM   Duration:  45 minutes  Attendees: patient    Identifying Information and Presenting Problem:  Tato is a 28 year old adult being seen for problematic symptoms of depression and a pattern of problematic alcohol use. He is currently engaged in BOY IOP through [facility]. Today was final session with this provider at [facility], given provider's departure. He has an individual therapy intake scheduled for 9/01/23 at [facility].     Treatment Objective(s) Addressed in This Session:  Depressed Mood: Decrease frequency and intensity of feeling down, depressed, hopeless    Progress on / Status of Treatment Objective(s) / Homework:  Satisfactory Progress      Mental Health Screening Questionnaires:  PHQ-9:       6/13/2023    10:01 AM 7/13/2023    11:24 AM 8/22/2023     8:24 AM   PHQ   PHQ-9 Total Score 11    11 10 10   Q9: Thoughts of better off dead/self-harm past 2 weeks Several days    Several days Not at all Several days   F/U: Thoughts of suicide or self-harm Yes    Yes     F/U: Self harm-plan No    No     F/U: Self-harm action No    No     F/U: Safety concerns No    No        ROWDY-7:       3/6/2023     4:10 PM 6/5/2023     8:31 AM 8/22/2023     8:24 AM   ROWDY-7 SCORE   Total Score 3 2 3       Topics Discussed/Interventions Provided:    Termination/therapy transition: patient has intake scheduled with new therapist at [facility], given this provider's departure from clinic. He expressed wanting to remain open-minded about meeting this nnew therapist but also wanting to continue working with this provider, after recently learning this was an option. Processed challenge of today being last session at clinic and not knowing whether this is a \"goodbye\" or \"see " "you later\". Validated this uncertainty. Mutually expressed sadness about this potentially being final meeting. Patient expressed gratitude, sharing this has been longest he has met with a therapist. He shared benefit of meeting with therapist during times when he was having suicidal thoughts and feeling it was the only space to he could voice thoughts and feelings. Reflected on changes that have occurred in past ~16 months and celebrated many instances of progress. Reassured patient that he can reach out to provider's new place of employment at any point in future to re-connect for  therapy.     Assessment:   The patient appeared to be active and engaged in today's session and was receptive to feedback.     Mental Status:   During interaction with the examiner today, Godwin was cooperative, open, and engaged. Patient was generally alert and oriented to person, place, time, and situation. They were casually dressed, appropriately groomed and appeared stated age. Patient's attire was appropriate for the weather and occasion. Eye contact was good. Psychomotor functioning: normal or unremarkable. Speech was normal limits tone, rate, volume; largely coherent and relevant to topic. Mood was \"pretty good\"; affect was full range and appropriate. Thought processes were unremarkable. Thought content was notable for passive suicidal ideation, without any intent, urges, or planning, and contained no evidence of psychosis.  Memory appeared grossly intact without being formally evaluated. Insight: good. Judgment was good. Patient exhibited good impulse control during the appointment.    Does the patient appear to be at imminent risk of harm to self/others at this time? No    The session was necessary to address symptoms of trauma and low mood that have been interfering with patient's ability to function in areas related to interacting and relating with others, maintaining personal health and physical well-being and participating " in meaningful activities. Ongoing psychotherapy is necessary to provide counseling.    DSM-5 Diagnosis:  Major Depressive Disorder, Recurrent, Moderate   Other Stressor or Trauma-Related Disorder   R/o Alcohol Use Disorder     Discharge Summary:   Tato is a 28 year old adult who was seen for problematic symptoms of depression, a problematic pattern of alcohol use, and history of relational trauma. He presented to therapy on 4/29/22 with this provider and has engaged in a course of relational psychodynamic and CBT-informed therapy. He is currently participating in BOY IOP at [facility] and will be continuing individual psychotherapy. His PHQ-9 score went from 13 at treatment onset to 10 today. Likewise, his ROWDY-7 score went from 7 at treatment onset to 3 today. He has maintained his therapeutic gains over the last couple of months and is motivated to continue engaging in MH and BOY treatment. During today's discharge session, Tato's symptoms, skills, improvements, and relapse prevention plans were reviewed. Tato was invited to return to [facility] in the future if he requires additional psychological services at a later time    Plan:  Continue engaging in BOY IOP at [facility] (20 weeks program, ends ~November)    Patient has therapy intake scheduled at [facility] samantha Goel ([facility]) on 9/01/23   Obtained signed SIDDHARTH for [facility], should patient wish to continue therapy with current provider at new location   Patient encouraged to reach out to provider at clinic until 8/31 (final day in clinic)  5.  After Visit Summary will be viewed in [employer] BLANCO, PhD, LP     NOTE: Treatment plan update due next session.  Diagnostic assessment update due 5/17/2024.

## 2023-08-22 ENCOUNTER — OFFICE VISIT (OUTPATIENT)
Dept: PSYCHOLOGY | Facility: CLINIC | Age: 28
End: 2023-08-22
Payer: COMMERCIAL

## 2023-08-22 DIAGNOSIS — F33.1 MAJOR DEPRESSIVE DISORDER, RECURRENT, MODERATE (H): Primary | ICD-10-CM

## 2023-08-22 PROCEDURE — 90834 PSYTX W PT 45 MINUTES: CPT | Performed by: PSYCHOLOGIST

## 2023-08-22 ASSESSMENT — PATIENT HEALTH QUESTIONNAIRE - PHQ9
5. POOR APPETITE OR OVEREATING: SEVERAL DAYS
SUM OF ALL RESPONSES TO PHQ QUESTIONS 1-9: 10

## 2023-08-22 ASSESSMENT — ANXIETY QUESTIONNAIRES
IF YOU CHECKED OFF ANY PROBLEMS ON THIS QUESTIONNAIRE, HOW DIFFICULT HAVE THESE PROBLEMS MADE IT FOR YOU TO DO YOUR WORK, TAKE CARE OF THINGS AT HOME, OR GET ALONG WITH OTHER PEOPLE: SOMEWHAT DIFFICULT
GAD7 TOTAL SCORE: 3
2. NOT BEING ABLE TO STOP OR CONTROL WORRYING: NOT AT ALL
1. FEELING NERVOUS, ANXIOUS, OR ON EDGE: SEVERAL DAYS
GAD7 TOTAL SCORE: 3
7. FEELING AFRAID AS IF SOMETHING AWFUL MIGHT HAPPEN: NOT AT ALL
3. WORRYING TOO MUCH ABOUT DIFFERENT THINGS: NOT AT ALL
6. BECOMING EASILY ANNOYED OR IRRITABLE: SEVERAL DAYS
5. BEING SO RESTLESS THAT IT IS HARD TO SIT STILL: NOT AT ALL

## 2023-08-22 NOTE — Clinical Note
Just DWAINI - today was my final session with Tato at [facility]. He is set up with Edd and has an intake with [facility] for individual therapy. He may also transfer to continue working with me in the future at [facility] and has instructions for doing so. Please let me know if you have any questions or concerns. Thanks!

## 2023-08-22 NOTE — PATIENT INSTRUCTIONS
I will be reachable until 8/31/23 at [facility].     I look forward to possibly continuing our work together at [facility]! I will not be starting my role there until October, so there will be a brief pause in our work. Here is the information for the clinic I'm moving to:     [facility]   [address]  [address]  [place] [address]    Ph: [phone]  Fx: [phone]  Email: [email or web address]      Important Instructions     1) Call [facility] to get on the waiting list. Request that you would like to see me as your provider. The clinic will keep a separate list for patients continuing their care with me.     2) The intake team will do a brief intake over the phone with you to gather some basic information for their system.     3) Your relevant records will be sent to [facility] for a smooth transition in your care.     4) If you need anything in the immediate future, I encourage you to schedule a visit with your primary care doctor.       See you soon!     Allan

## 2023-08-26 DIAGNOSIS — F33.2 SEVERE EPISODE OF RECURRENT MAJOR DEPRESSIVE DISORDER, WITHOUT PSYCHOTIC FEATURES (H): ICD-10-CM

## 2023-08-28 RX ORDER — BUPROPION HYDROCHLORIDE 300 MG/1
300 TABLET ORAL EVERY MORNING
Qty: 90 TABLET | Refills: 3 | Status: SHIPPED | OUTPATIENT
Start: 2023-08-28 | End: 2024-01-02

## 2023-08-28 NOTE — TELEPHONE ENCOUNTER
"Request for medication refill:  buPROPion (WELLBUTRIN XL) 300 MG 24 hr tablet   Providers if patient needs an appointment and you are willing to give a one month supply please refill for one month and  send a letter/MyChart using \".SMILLIMITEDREFILL\" .smillimited and route chart to \"P [facility] \" (Giving one month refill in non controlled medications is strongly recommended before denial)    If refill has been denied, meaning absolutely no refills without visit, please complete the smart phrase \".smirxrefuse\" and route it to the \"P [facility] MED REFILLS\"  pool to inform the patient and the pharmacy.    Ji Ashraf MA     "

## 2023-11-02 ENCOUNTER — IMMUNIZATION (OUTPATIENT)
Dept: FAMILY MEDICINE | Facility: CLINIC | Age: 28
End: 2023-11-02
Payer: COMMERCIAL

## 2023-11-02 DIAGNOSIS — Z23 ENCOUNTER FOR IMMUNIZATION: Primary | ICD-10-CM

## 2023-11-02 PROCEDURE — 90480 ADMN SARSCOV2 VAC 1/ONLY CMP: CPT

## 2023-11-02 PROCEDURE — 91320 SARSCV2 VAC 30MCG TRS-SUC IM: CPT

## 2023-11-02 PROCEDURE — 99207 PR NO CHARGE NURSE ONLY: CPT

## 2023-11-02 PROCEDURE — 90686 IIV4 VACC NO PRSV 0.5 ML IM: CPT

## 2023-11-02 PROCEDURE — 90471 IMMUNIZATION ADMIN: CPT

## 2024-01-02 ENCOUNTER — OFFICE VISIT (OUTPATIENT)
Dept: FAMILY MEDICINE | Facility: CLINIC | Age: 29
End: 2024-01-02
Payer: COMMERCIAL

## 2024-01-02 VITALS
DIASTOLIC BLOOD PRESSURE: 83 MMHG | HEART RATE: 81 BPM | OXYGEN SATURATION: 98 % | SYSTOLIC BLOOD PRESSURE: 126 MMHG | BODY MASS INDEX: 36.48 KG/M2 | WEIGHT: 219.2 LBS | RESPIRATION RATE: 14 BRPM

## 2024-01-02 DIAGNOSIS — F10.90 ALCOHOL USE DISORDER: ICD-10-CM

## 2024-01-02 DIAGNOSIS — F33.2 SEVERE EPISODE OF RECURRENT MAJOR DEPRESSIVE DISORDER, WITHOUT PSYCHOTIC FEATURES (H): ICD-10-CM

## 2024-01-02 DIAGNOSIS — Z11.3 ROUTINE SCREENING FOR STI (SEXUALLY TRANSMITTED INFECTION): ICD-10-CM

## 2024-01-02 DIAGNOSIS — R87.611 ATYPICAL SQUAMOUS CELLS CANNOT EXCLUDE HIGH GRADE SQUAMOUS INTRAEPITHELIAL LESION ON CYTOLOGIC SMEAR OF CERVIX (ASC-H): ICD-10-CM

## 2024-01-02 DIAGNOSIS — Z12.4 CERVICAL CANCER SCREENING: Primary | ICD-10-CM

## 2024-01-02 DIAGNOSIS — F64.9 GENDER DYSPHORIA: ICD-10-CM

## 2024-01-02 LAB
C TRACH DNA SPEC QL PROBE+SIG AMP: NEGATIVE
HCV AB SERPL QL IA: NONREACTIVE
HIV 1+2 AB+HIV1 P24 AG SERPL QL IA: NONREACTIVE
N GONORRHOEA DNA SPEC QL NAA+PROBE: NEGATIVE
T PALLIDUM AB SER QL: NONREACTIVE

## 2024-01-02 PROCEDURE — 86780 TREPONEMA PALLIDUM: CPT | Performed by: STUDENT IN AN ORGANIZED HEALTH CARE EDUCATION/TRAINING PROGRAM

## 2024-01-02 PROCEDURE — 36415 COLL VENOUS BLD VENIPUNCTURE: CPT | Performed by: STUDENT IN AN ORGANIZED HEALTH CARE EDUCATION/TRAINING PROGRAM

## 2024-01-02 PROCEDURE — 88175 CYTOPATH C/V AUTO FLUID REDO: CPT | Performed by: STUDENT IN AN ORGANIZED HEALTH CARE EDUCATION/TRAINING PROGRAM

## 2024-01-02 PROCEDURE — 87591 N.GONORRHOEAE DNA AMP PROB: CPT | Performed by: STUDENT IN AN ORGANIZED HEALTH CARE EDUCATION/TRAINING PROGRAM

## 2024-01-02 PROCEDURE — 87491 CHLMYD TRACH DNA AMP PROBE: CPT | Performed by: STUDENT IN AN ORGANIZED HEALTH CARE EDUCATION/TRAINING PROGRAM

## 2024-01-02 PROCEDURE — 99214 OFFICE O/P EST MOD 30 MIN: CPT | Performed by: STUDENT IN AN ORGANIZED HEALTH CARE EDUCATION/TRAINING PROGRAM

## 2024-01-02 PROCEDURE — 87389 HIV-1 AG W/HIV-1&-2 AB AG IA: CPT | Performed by: STUDENT IN AN ORGANIZED HEALTH CARE EDUCATION/TRAINING PROGRAM

## 2024-01-02 PROCEDURE — 86803 HEPATITIS C AB TEST: CPT | Performed by: STUDENT IN AN ORGANIZED HEALTH CARE EDUCATION/TRAINING PROGRAM

## 2024-01-02 PROCEDURE — 87624 HPV HI-RISK TYP POOLED RSLT: CPT | Performed by: STUDENT IN AN ORGANIZED HEALTH CARE EDUCATION/TRAINING PROGRAM

## 2024-01-02 RX ORDER — BUPROPION HYDROCHLORIDE 300 MG/1
300 TABLET ORAL EVERY MORNING
Qty: 90 TABLET | Refills: 3 | Status: SHIPPED | OUTPATIENT
Start: 2024-01-02

## 2024-01-02 RX ORDER — TESTOSTERONE CYPIONATE 200 MG/ML
50 INJECTION, SOLUTION INTRAMUSCULAR WEEKLY
Start: 2024-01-02

## 2024-01-02 RX ORDER — NALTREXONE HYDROCHLORIDE 50 MG/1
50 TABLET, FILM COATED ORAL DAILY
Qty: 30 TABLET | Refills: 4 | Status: SHIPPED | OUTPATIENT
Start: 2024-01-02

## 2024-01-02 RX ORDER — NEEDLES, SAFETY 18GX1 1/2"
NEEDLE, DISPOSABLE MISCELLANEOUS
Start: 2024-01-02

## 2024-01-02 ASSESSMENT — PATIENT HEALTH QUESTIONNAIRE - PHQ9: SUM OF ALL RESPONSES TO PHQ QUESTIONS 1-9: 4

## 2024-01-02 NOTE — PROGRESS NOTES
"  Assessment & Plan     Severe episode of recurrent major depressive disorder, without psychotic features (H)  Has discontinued Abilify. Reports overall stable. PHQ9 stable. Following with therapist, has upcoming prescriber establish care at [facility].   - buPROPion (WELLBUTRIN XL) 300 MG 24 hr tablet  Dispense: 90 tablet; Refill: 3    Alcohol use disorder  Looking to cut back again. Found naltrexone helpful in the past. Discussed daily vs prn use to reduce cravings and intake.   - naltrexone (DEPADE/REVIA) 50 MG tablet  Dispense: 30 tablet; Refill: 4    Cervical cancer screening  Atypical squamous cells cannot exclude high grade squamous intraepithelial lesion on cytologic smear of cervix (ASC-H)  Had neg colp, was due for f/u pap with cotesting in August. Asymptomatic.   - Pap Screen reflex to HPV if ASCUS - recommend age 25 - 29  - HPV Hold (Lab Only)    Gender dysphoria  Stable on current dose, not due for labs   - Needle, Disp, (BD ECLIPSE NEEDLE) 25G X 5/8\" MISC  - testosterone cypionate (DEPOTESTOSTERONE) 200 MG/ML injection    Routine screening for STI (sexually transmitted infection)  Routine screening   - Chlamydia trachomatis/Neisseria gonorrhoeae by PCR  - HIV Antigen Antibody Combo Cascade  - Treponema Abs w Reflex to RPR and Titer  - Hepatitis C Screen Reflex to HCV RNA Quant and Genotype  - HIV Antigen Antibody Combo Cascade  - Treponema Abs w Reflex to RPR and Titer  - Hepatitis C Screen Reflex to HCV RNA Quant and Genotype      Ordering of each unique test  Prescription drug management  39 minutes spent by me on the date of the encounter doing chart review, history and exam, documentation and further activities per the note     BMI:   Estimated body mass index is 36.48 kg/m  as calculated from the following:    Height as of 7/7/23: 1.651 m (5' 5\").    Weight as of this encounter: 99.4 kg (219 lb 3.2 oz).   Weight management plan: Patient was referred to their PCP to discuss a diet and exercise " "plan.        Return in about 6 months (around 7/2/2024).    Ilene Manzo [facility] LOLI Godwin is a 28 year old, presenting for the following health issues:  Recheck Medication      HPI     Medication. Psychiatrist he was seeing moved, not w/ m [facility] anymore. Tried to fill out a form online to get a different one. Never got back to him. Has an appt with [facility] in February - 2/16/24. Seeing Shady for therapy.     Alcohol use has been \"okay\". New year's was bad, before that was pretty good.         7/13/2023    11:24 AM 8/22/2023     8:24 AM 1/2/2024     9:28 AM   PHQ   PHQ-9 Total Score 10 10 4   Q9: Thoughts of better off dead/self-harm past 2 weeks Not at all Several days Not at all     ++++++++++++++++++++++++++++++++++++++      Review of Systems   Pertinent positives and negatives per HPI.        Objective    /83   Pulse 81   Resp 14   Wt 99.4 kg (219 lb 3.2 oz)   SpO2 98%   BMI 36.48 kg/m    Body mass index is 36.48 kg/m .  Physical Exam   GENERAL: alert, cooperative, in no acute distress  HEENT: sclera clear  PULM: normal respiratory effort   : normal external genitalia without lesion. Vaginal mucosa pink and well rugated. Cervix visualized and normal in appearance.    NEURO: alert and oriented, grossly intact, moves all extremities, normal gait   SKIN: no rashes or lesions visualized   PSYCH: euthymic affect          "

## 2024-01-04 LAB
BKR LAB AP GYN ADEQUACY: NORMAL
BKR LAB AP GYN INTERPRETATION: NORMAL
BKR LAB AP HPV REFLEX: NORMAL
BKR LAB AP PREVIOUS ABNL DX: NORMAL
BKR LAB AP PREVIOUS ABNORMAL: NORMAL
PATH REPORT.COMMENTS IMP SPEC: NORMAL
PATH REPORT.COMMENTS IMP SPEC: NORMAL
PATH REPORT.RELEVANT HX SPEC: NORMAL

## 2024-01-09 PROBLEM — R87.611 ATYPICAL SQUAMOUS CELLS CANNOT EXCLUDE HIGH GRADE SQUAMOUS INTRAEPITHELIAL LESION ON CYTOLOGIC SMEAR OF CERVIX (ASC-H): Status: ACTIVE | Noted: 2023-02-02

## 2024-01-09 LAB
HUMAN PAPILLOMA VIRUS 16 DNA: NEGATIVE
HUMAN PAPILLOMA VIRUS 18 DNA: NEGATIVE
HUMAN PAPILLOMA VIRUS FINAL DIAGNOSIS: NORMAL
HUMAN PAPILLOMA VIRUS OTHER HR: NEGATIVE

## 2024-03-11 ENCOUNTER — TELEPHONE (OUTPATIENT)
Dept: PLASTIC SURGERY | Facility: CLINIC | Age: 29
End: 2024-03-11
Payer: COMMERCIAL

## 2024-03-11 NOTE — CONFIDENTIAL NOTE
Writer YASHIRA re: follow up on patient voicemail requesting bottom surgery consult. Also sent [employer] message.

## 2024-07-27 ENCOUNTER — HEALTH MAINTENANCE LETTER (OUTPATIENT)
Age: 29
End: 2024-07-27

## 2024-09-30 ENCOUNTER — TELEPHONE (OUTPATIENT)
Dept: FAMILY MEDICINE | Facility: CLINIC | Age: 29
End: 2024-09-30
Payer: COMMERCIAL

## 2024-09-30 NOTE — TELEPHONE ENCOUNTER
[facility] phone call message - order or referral request for patient:     Order or referral being requested: Referral      Additional Comments: The patient is requesting a referral for mental health services.    OK to leave a message on voice mail? Yes    Primary language: English      needed? No    Call taken on September 30, 2024 at 10:53 AM by Ivy Vang

## 2024-09-30 NOTE — TELEPHONE ENCOUNTER
Happy to refer. Can CC clarify with pt what mental health services they're seeking? (Psychotherapy, Psychiatrist, other)? If something has significantly changed or they are really struggling, I would really love for them to have a visit w/ someone on my team and make sure we're going the right things for them. I'm happy to message w/ them if there's more to clarify.

## 2024-09-30 NOTE — TELEPHONE ENCOUNTER
10:51am Care Coordinator forwarding message to  for possible Mental Health referral.      Laura Sim  Lead Care Coordinator  [facility]  [phone]

## 2024-10-01 NOTE — TELEPHONE ENCOUNTER
10/01/24 Attempted to reach patient regarding request for Mental Health referral. Left brief message and direct number for callback.      Laura Sim  Lead Care Coordinator  [facility]  [phone]

## 2024-10-02 NOTE — TELEPHONE ENCOUNTER
10/2/24 Care Coordinator attempted once again to reach out to patient to get clarification on what it is he is looking for in a Mental Health referral. CC left another message, along with her direct number. Will also send a letter out to patient home.      Laura Sim  Lead Care Coordinator  [facility]  [phone]

## 2024-10-25 ENCOUNTER — MYC REFILL (OUTPATIENT)
Dept: FAMILY MEDICINE | Facility: CLINIC | Age: 29
End: 2024-10-25
Payer: COMMERCIAL

## 2024-10-25 DIAGNOSIS — F10.90 ALCOHOL USE DISORDER: ICD-10-CM

## 2024-10-25 DIAGNOSIS — F64.9 GENDER DYSPHORIA: ICD-10-CM

## 2024-10-28 RX ORDER — TESTOSTERONE CYPIONATE 200 MG/ML
50 INJECTION, SOLUTION INTRAMUSCULAR WEEKLY
Qty: 4 ML | Refills: 3 | Status: SHIPPED | OUTPATIENT
Start: 2024-10-28

## 2024-10-28 RX ORDER — FINASTERIDE 1 MG/1
1 TABLET, FILM COATED ORAL
OUTPATIENT
Start: 2024-10-28

## 2024-10-28 RX ORDER — NALTREXONE HYDROCHLORIDE 50 MG/1
50 TABLET, FILM COATED ORAL DAILY
Qty: 30 TABLET | Refills: 11 | Status: SHIPPED | OUTPATIENT
Start: 2024-10-28

## 2024-10-28 NOTE — TELEPHONE ENCOUNTER
"Request for medication refill: finasteride (PROPECIA) 1 MG tablet, naltrexone (DEPADE/REVIA) 50 MG tablet, testosterone cypionate (DEPOTESTOSTERONE) 200 MG/ML injection     Providers if patient needs an appointment and you are willing to give a one month supply please refill for one month and  send a letter/MyChart using \".SMILLIMITEDREFILL\" .smillimited and route chart to \"P [facility] \" (Giving one month refill in non controlled medications is strongly recommended before denial)    If refill has been denied, meaning absolutely no refills without visit, please complete the smart phrase \".smirxrefuse\" and route it to the \"P [facility] MED REFILLS\"  pool to inform the patient and the pharmacy.    Greta Quiroga, CMA    "

## 2024-12-15 SDOH — HEALTH STABILITY: PHYSICAL HEALTH: ON AVERAGE, HOW MANY DAYS PER WEEK DO YOU ENGAGE IN MODERATE TO STRENUOUS EXERCISE (LIKE A BRISK WALK)?: 2 DAYS

## 2024-12-15 SDOH — HEALTH STABILITY: PHYSICAL HEALTH: ON AVERAGE, HOW MANY MINUTES DO YOU ENGAGE IN EXERCISE AT THIS LEVEL?: 20 MIN

## 2024-12-15 ASSESSMENT — SOCIAL DETERMINANTS OF HEALTH (SDOH): HOW OFTEN DO YOU GET TOGETHER WITH FRIENDS OR RELATIVES?: TWICE A WEEK

## 2024-12-19 ASSESSMENT — PATIENT HEALTH QUESTIONNAIRE - PHQ9
SUM OF ALL RESPONSES TO PHQ QUESTIONS 1-9: 9
SUM OF ALL RESPONSES TO PHQ QUESTIONS 1-9: 9
10. IF YOU CHECKED OFF ANY PROBLEMS, HOW DIFFICULT HAVE THESE PROBLEMS MADE IT FOR YOU TO DO YOUR WORK, TAKE CARE OF THINGS AT HOME, OR GET ALONG WITH OTHER PEOPLE: SOMEWHAT DIFFICULT

## 2024-12-20 ENCOUNTER — OFFICE VISIT (OUTPATIENT)
Dept: FAMILY MEDICINE | Facility: CLINIC | Age: 29
End: 2024-12-20
Payer: COMMERCIAL

## 2024-12-20 VITALS
TEMPERATURE: 98.4 F | HEART RATE: 84 BPM | WEIGHT: 204 LBS | HEIGHT: 66 IN | OXYGEN SATURATION: 97 % | RESPIRATION RATE: 16 BRPM | BODY MASS INDEX: 32.78 KG/M2 | SYSTOLIC BLOOD PRESSURE: 126 MMHG | DIASTOLIC BLOOD PRESSURE: 84 MMHG

## 2024-12-20 DIAGNOSIS — Z00.00 ROUTINE GENERAL MEDICAL EXAMINATION AT A HEALTH CARE FACILITY: Primary | ICD-10-CM

## 2024-12-20 DIAGNOSIS — F64.9 GENDER DYSPHORIA: ICD-10-CM

## 2024-12-20 DIAGNOSIS — F10.10 MILD ALCOHOL USE DISORDER: ICD-10-CM

## 2024-12-20 DIAGNOSIS — Z12.4 CERVICAL CANCER SCREENING: ICD-10-CM

## 2024-12-20 DIAGNOSIS — Z11.3 ROUTINE SCREENING FOR STI (SEXUALLY TRANSMITTED INFECTION): ICD-10-CM

## 2024-12-20 DIAGNOSIS — Z30.09 EMERGENCY CONTRACEPTIVE COUNSELING: ICD-10-CM

## 2024-12-20 DIAGNOSIS — R87.611 ATYPICAL SQUAMOUS CELLS CANNOT EXCLUDE HIGH GRADE SQUAMOUS INTRAEPITHELIAL LESION ON CYTOLOGIC SMEAR OF CERVIX (ASC-H): ICD-10-CM

## 2024-12-20 DIAGNOSIS — F17.200 VAPING NICOTINE DEPENDENCE, NON-TOBACCO PRODUCT: ICD-10-CM

## 2024-12-20 PROCEDURE — 99395 PREV VISIT EST AGE 18-39: CPT | Mod: 25 | Performed by: STUDENT IN AN ORGANIZED HEALTH CARE EDUCATION/TRAINING PROGRAM

## 2024-12-20 PROCEDURE — 90677 PCV20 VACCINE IM: CPT | Performed by: STUDENT IN AN ORGANIZED HEALTH CARE EDUCATION/TRAINING PROGRAM

## 2024-12-20 PROCEDURE — 90472 IMMUNIZATION ADMIN EACH ADD: CPT | Performed by: STUDENT IN AN ORGANIZED HEALTH CARE EDUCATION/TRAINING PROGRAM

## 2024-12-20 PROCEDURE — 90632 HEPA VACCINE ADULT IM: CPT | Performed by: STUDENT IN AN ORGANIZED HEALTH CARE EDUCATION/TRAINING PROGRAM

## 2024-12-20 PROCEDURE — 90471 IMMUNIZATION ADMIN: CPT | Performed by: STUDENT IN AN ORGANIZED HEALTH CARE EDUCATION/TRAINING PROGRAM

## 2024-12-20 PROCEDURE — 99214 OFFICE O/P EST MOD 30 MIN: CPT | Mod: 25 | Performed by: STUDENT IN AN ORGANIZED HEALTH CARE EDUCATION/TRAINING PROGRAM

## 2024-12-20 RX ORDER — VENLAFAXINE HYDROCHLORIDE 75 MG/1
75 CAPSULE, EXTENDED RELEASE ORAL DAILY
COMMUNITY
Start: 2024-12-17

## 2024-12-20 RX ORDER — VENLAFAXINE HYDROCHLORIDE 37.5 MG/1
37.5 CAPSULE, EXTENDED RELEASE ORAL DAILY
COMMUNITY
Start: 2024-12-17

## 2024-12-20 NOTE — PROGRESS NOTES
"Preventive Care Visit  [facility] LOLI Manzo DO, Family Medicine  Dec 20, 2024      Assessment & Plan     Routine general medical examination at a health care facility  Preventive recs reviewed     Cervical cancer screening  Atypical squamous cells cannot exclude high grade squamous intraepithelial lesion on cytologic smear of cervix (ASC-H)  Due for pap upcoming -- will RTC for dedicated visit     Routine screening for STI (sexually transmitted infection)  - HIV Antigen Antibody Combo Cascade  - Treponema Abs w Reflex to RPR and Titer  - Hepatitis C Screen Reflex to HCV RNA Quant and Genotype  - Chlamydia trachomatis/Neisseria gonorrhoeae by PCR    Mild alcohol use disorder  Working with therapist.   - continue naltrexone     Vaping nicotine dependence, non-tobacco product  Discussed strategies to decrease use. Declines NRT     Gender dysphoria  Stable on T. Due for annual labs, will schedule for mid-cycle   - Hemoglobin  - Testosterone total  - Comprehensive metabolic panel  - Lipid panel reflex to direct LDL Fasting    Emergency contraceptive counseling  Primarily condoms for contraception when applicable. Accepts EC   - Ulipristal Acetate (SAMMI) 30 MG tablet  Dispense: 1 tablet; Refill: 2    Depression  Outside prescriber + following w/ therapist       BMI  Estimated body mass index is 32.93 kg/m  as calculated from the following:    Height as of this encounter: 1.676 m (5' 6\").    Weight as of this encounter: 92.5 kg (204 lb).   Weight management plan: Discussed healthy diet and exercise guidelines    Depression Screening Follow Up        12/19/2024     2:04 PM   PHQ   PHQ-9 Total Score 9    Q9: Thoughts of better off dead/self-harm past 2 weeks Several days   F/U: Thoughts of suicide or self-harm Yes   F/U: Self harm-plan No   F/U: Self-harm action No   F/U: Safety concerns No       Patient-reported     Reports no acute concerns, no intent or plan. Following regularly w/ " therapist and prescriber through [facility]      Counseling  Appropriate preventive services were addressed with this patient via screening, questionnaire, or discussion as appropriate for fall prevention, nutrition, physical activity, Tobacco-use cessation, social engagement, weight loss and cognition.  Checklist reviewing preventive services available has been given to the patient.  Reviewed patient's diet, addressing concerns and/or questions.   He is at risk for lack of exercise and has been provided with information to increase physical activity for the benefit of his well-being.   He is at risk for psychosocial distress and has been provided with information to reduce risk.   The patient reports drinking more than 3 alcoholic drinks per day and/or more than 7 drhnks per week. The patient was counseled and given information about possible harmful effects of excessive alcohol intake.The patient's PHQ-9 score is consistent with mild depression. He was provided with information regarding depression.       Return in about 6 months (around 6/20/2025) for Annual Wellness Visit.    Vivi Godwin is a 29 year old, presenting for the following:  Physical and Recheck Medication        12/20/2024     1:01 PM   Additional Questions   Roomed by ohn   Accompanied by self         12/20/2024    Information    services provided? No          HPI  Starting a new med today - venlafaxine   [facility], still seeing Allan and a prescriber            12/15/2024   General Health   How would you rate your overall physical health? (!) FAIR   Feel stress (tense, anxious, or unable to sleep) To some extent   (!) STRESS CONCERN      12/15/2024   Nutrition   Three or more servings of calcium each day? (!) NO   Diet: Regular (no restrictions)   How many servings of fruit and vegetables per day? (!) 2-3   How many sweetened beverages each day? 0-1     Diet's okay minus the alcohol   Naltrexone helps a lot - has  definitely gone down   Protein, veggies daily   Not a lot of processed foods. Eats out maybe 3-4x/month.         12/15/2024   Exercise   Days per week of moderate/strenous exercise 2 days   Average minutes spent exercising at this level 20 min   (!) EXERCISE CONCERN  Has a gym in his apartment         12/15/2024   Social Factors   Frequency of gathering with friends or relatives Twice a week   Worry food won't last until get money to buy more No   Food not last or not have enough money for food? No   Do you have housing? (Housing is defined as stable permanent housing and does not include staying ouside in a car, in a tent, in an abandoned building, in an overnight shelter, or couch-surfing.) Yes   Are you worried about losing your housing? No   Lack of transportation? No   Unable to get utilities (heat,electricity)? No         12/15/2024   Dental   Dentist two times every year? Yes         12/15/2024   TB Screening   Were you born outside of the [place]? No       Today's PHQ-9 Score:       12/19/2024     2:04 PM   PHQ-9 SCORE   PHQ-9 Total Score MyChart 9 (Mild depression)   PHQ-9 Total Score 9        Patient-reported         12/15/2024   Substance Use   Alcohol more than 3/day or more than 7/wk Yes   How often do you have a drink containing alcohol 4 or more times a week   How many alcohol drinks on typical day 3 or 4   How often do you have 5+ drinks at one occasion Monthly   Audit 2/3 Score 3   How often not able to stop drinking once started Monthly   How often failed to do what normally expected Less than monthly   How often needed first drink in am after a heavy drinking session Never   How often feeling of guilt or remorse after drinking Less than monthly   How often unable to remember what happened the night before Less than monthly   Have you or someone else been injured because of your drinking No   Has anyone been concerned or suggested you cut down on drinking Yes, during the last year   TOTAL SCORE - AUDIT  "16   Do you use any other substances recreationally? No     Social History     Tobacco Use    Smoking status: Former     Types: Other     Passive exposure: Past    Smokeless tobacco: Never   Vaping Use    Vaping status: Every Day    Substances: Nicotine, Flavoring    Devices: Refillable tank   Substance Use Topics    Alcohol use: Yes     Comment: 7 drink per week    Drug use: Never     Alcohol use: drinks maybe 3 nights/week, usually has 3-5 drinks at a time     Nicotine: vaping hourly. Wants to cut back          Mammogram Screening - Patient under 40 years of age: Routine Mammogram Screening not recommended.         12/15/2024   STI Screening   New sexual partner(s) since last STI/HIV test? (!) YES      History of abnormal Pap smear: YES - reflected in Problem List and Health Maintenance accordingly        Latest Ref Rng & Units 1/2/2024    10:05 AM 1/24/2023     4:09 PM 7/29/2021     3:08 PM   PAP / HPV   PAP  Negative for Intraepithelial Lesion or Malignancy (NILM)  Atypical squamous cells of undetermined significance, cannot exclude high-grade squamous intraepithelial lesion (ASC-H)     HPV 16 DNA Negative Negative      HPV 18 DNA Negative Negative      Other HR HPV Negative Negative      PAP-ABSTRACT    See Scanned Document           This result is from an external source.     Got his period for 3-4 days when off of testosterone   Shot day is Justus         12/15/2024   Contraception/Family Planning   Questions about contraception or family planning No        Reviewed and updated as needed this visit by Provider   Tobacco  Allergies  Meds  Problems  Med Hx  Surg Hx  Fam Hx                Objective    Exam  /84   Pulse 84   Temp 98.4  F (36.9  C) (Oral)   Resp 16   Ht 1.676 m (5' 6\")   Wt 92.5 kg (204 lb)   SpO2 97%   BMI 32.93 kg/m     Estimated body mass index is 32.93 kg/m  as calculated from the following:    Height as of this encounter: 1.676 m (5' 6\").    Weight as of this encounter: " 92.5 kg (204 lb).    Physical Exam  GENERAL: alert and no distress  RESP: lungs clear to auscultation - no rales, rhonchi or wheezes  CV: regular rate and rhythm, normal S1 S2, no S3 or S4, no murmur, click or rub, no peripheral edema  MS: no gross musculoskeletal defects noted, no edema        Signed Electronically by: Ilene Manzo DO    Answers submitted by the patient for this visit:  Patient Health Questionnaire (Submitted on 12/19/2024)  If you checked off any problems, how difficult have these problems made it for you to do your work, take care of things at home, or get along with other people?: Somewhat difficult  PHQ9 TOTAL SCORE: 9

## 2024-12-23 NOTE — PATIENT INSTRUCTIONS
Patient Education   Preventive Care Advice   This is general advice given by our system to help you stay healthy. However, your care team may have specific advice just for you. Please talk to your care team about your preventive care needs.  Nutrition  Eat 5 or more servings of fruits and vegetables each day.  Try wheat bread, brown rice and whole grain pasta (instead of white bread, rice, and pasta).  Get enough calcium and vitamin D. Check the label on foods and aim for 100% of the RDA (recommended daily allowance).  Lifestyle  Exercise at least 150 minutes each week  (30 minutes a day, 5 days a week).  Do muscle strengthening activities 2 days a week. These help control your weight and prevent disease.  No smoking.  Wear sunscreen to prevent skin cancer.  Have a dental exam and cleaning every 6 months.  Yearly exams  See your health care team every year to talk about:  Any changes in your health.  Any medicines your care team has prescribed.  Preventive care, family planning, and ways to prevent chronic diseases.  Shots (vaccines)   HPV shots (up to age 26), if you've never had them before.  Hepatitis B shots (up to age 59), if you've never had them before.  COVID-19 shot: Get this shot when it's due.  Flu shot: Get a flu shot every year.  Tetanus shot: Get a tetanus shot every 10 years.  Pneumococcal, hepatitis A, and RSV shots: Ask your care team if you need these based on your risk.  Shingles shot (for age 50 and up)  General health tests  Diabetes screening:  Starting at age 35, Get screened for diabetes at least every 3 years.  If you are younger than age 35, ask your care team if you should be screened for diabetes.  Cholesterol test: At age 39, start having a cholesterol test every 5 years, or more often if advised.  Bone density scan (DEXA): At age 50, ask your care team if you should have this scan for osteoporosis (brittle bones).  Hepatitis C: Get tested at least once in your life.  STIs (sexually  transmitted infections)  Before age 24: Ask your care team if you should be screened for STIs.  After age 24: Get screened for STIs if you're at risk. You are at risk for STIs (including HIV) if:  You are sexually active with more than one person.  You don't use condoms every time.  You or a partner was diagnosed with a sexually transmitted infection.  If you are at risk for HIV, ask about PrEP medicine to prevent HIV.  Get tested for HIV at least once in your life, whether you are at risk for HIV or not.  Cancer screening tests  Cervical cancer screening: If you have a cervix, begin getting regular cervical cancer screening tests starting at age 21.  Breast cancer scan (mammogram): If you've ever had breasts, begin having regular mammograms starting at age 40. This is a scan to check for breast cancer.  Colon cancer screening: It is important to start screening for colon cancer at age 45.  Have a colonoscopy test every 10 years (or more often if you're at risk) Or, ask your provider about stool tests like a FIT test every year or Cologuard test every 3 years.  To learn more about your testing options, visit:   .  For help making a decision, visit:   https://bit.ly/zg66843.  Prostate cancer screening test: If you have a prostate, ask your care team if a prostate cancer screening test (PSA) at age 55 is right for you.  Lung cancer screening: If you are a current or former smoker ages 50 to 80, ask your care team if ongoing lung cancer screenings are right for you.  For informational purposes only. Not to replace the advice of your health care provider. Copyright   2023 [facility]. All rights reserved. Clinically reviewed by the [facility]. [employer] 888938 - REV 01/24.  9 Ways to Cut Back on Drinking  Maybe you've found yourself drinking more alcohol than you'd prefer. If you want to cut back, here are some ideas to try.    Think before you drink.  Do you really want a drink,  "or is it just a habit? If you're used to having a drink at a certain time, try doing something else then.     Look for substitutes.  Find some no-alcohol drinks that you enjoy, like flavored seltzer water, tea with honey, or tonic with a slice of lime. Or try alcohol-free beer or \"virgin\" cocktails (without the alcohol).     Drink more water.  Use water to quench your thirst. Drink a glass of water before you have any alcohol. Have another glass along with every drink or between drinks.     Shrink your drink.  For example, have a bottle of beer instead of a pint. Use a smaller glass for wine. Choose drinks with lower alcohol content (ABV%). Or use less liquor and more mixer in cocktails.     Slow down.  It's easy to drink quickly and without thinking about it. Pay attention, and make each drink last longer.     Do the math.  Total up how much you spend on alcohol each month. How much is that a year? If you cut back, what could you do with the money you save?     Take a break.  Choose a day or two each week when you won't drink at all. Notice how you feel on those days, physically and emotionally. How did you sleep? Do you feel better? Over time, add more break days.     Count calories.  Would you like to lose some weight? For some people that's a good motivator for cutting back. Figure out how many calories are in each drink. How many does that add up to in a day? In a week? In a month?     Practice saying no.  Be ready when someone offers you a drink. Try: \"Thanks, I've had enough.\" Or \"Thanks, but I'm cutting back.\" Or \"No, thanks. I feel better when I drink less.\"   Current as of: November 15, 2023  Content Version: 14.3    2024 [employer].   Care instructions adapted under license by your healthcare professional. If you have questions about a medical condition or this instruction, always ask your healthcare professional. [employer] disclaims any warranty or liability for your use of this " information.      No Ht information in chart

## 2025-01-02 ENCOUNTER — MYC MEDICAL ADVICE (OUTPATIENT)
Dept: FAMILY MEDICINE | Facility: CLINIC | Age: 30
End: 2025-01-02
Payer: COMMERCIAL

## 2025-01-08 ENCOUNTER — LAB (OUTPATIENT)
Dept: LAB | Facility: CLINIC | Age: 30
End: 2025-01-08
Payer: COMMERCIAL

## 2025-01-08 DIAGNOSIS — F64.9 GENDER DYSPHORIA: ICD-10-CM

## 2025-01-08 DIAGNOSIS — Z11.3 ROUTINE SCREENING FOR STI (SEXUALLY TRANSMITTED INFECTION): ICD-10-CM

## 2025-01-08 LAB
ALBUMIN SERPL BCG-MCNC: 4.3 G/DL (ref 3.5–5.2)
ALP SERPL-CCNC: 72 U/L (ref 40–150)
ALT SERPL W P-5'-P-CCNC: 27 U/L (ref 0–70)
ANION GAP SERPL CALCULATED.3IONS-SCNC: 10 MMOL/L (ref 7–15)
AST SERPL W P-5'-P-CCNC: 30 U/L (ref 0–45)
BILIRUB SERPL-MCNC: 0.8 MG/DL
BUN SERPL-MCNC: 6.6 MG/DL (ref 6–20)
C TRACH DNA SPEC QL PROBE+SIG AMP: NEGATIVE
CALCIUM SERPL-MCNC: 9.5 MG/DL (ref 8.8–10.4)
CHLORIDE SERPL-SCNC: 100 MMOL/L (ref 98–107)
CHOLEST SERPL-MCNC: 214 MG/DL
CREAT SERPL-MCNC: 0.93 MG/DL (ref 0.51–1.17)
EGFRCR SERPLBLD CKD-EPI 2021: NORMAL ML/MIN/{1.73_M2}
FASTING STATUS PATIENT QL REPORTED: YES
FASTING STATUS PATIENT QL REPORTED: YES
GLUCOSE SERPL-MCNC: 93 MG/DL (ref 70–99)
HCO3 SERPL-SCNC: 28 MMOL/L (ref 22–29)
HCV AB SERPL QL IA: NONREACTIVE
HDLC SERPL-MCNC: 61 MG/DL
HGB BLD-MCNC: 15.8 G/DL (ref 11.7–17.7)
HIV 1+2 AB+HIV1 P24 AG SERPL QL IA: NONREACTIVE
LDLC SERPL CALC-MCNC: 128 MG/DL
N GONORRHOEA DNA SPEC QL NAA+PROBE: NEGATIVE
NONHDLC SERPL-MCNC: 153 MG/DL
POTASSIUM SERPL-SCNC: 4 MMOL/L (ref 3.4–5.3)
PROT SERPL-MCNC: 6.8 G/DL (ref 6.4–8.3)
SODIUM SERPL-SCNC: 138 MMOL/L (ref 135–145)
SPECIMEN TYPE: NORMAL
TRIGL SERPL-MCNC: 126 MG/DL

## 2025-01-09 LAB — T PALLIDUM AB SER QL: NONREACTIVE

## 2025-01-29 ENCOUNTER — OFFICE VISIT (OUTPATIENT)
Dept: FAMILY MEDICINE | Facility: CLINIC | Age: 30
End: 2025-01-29
Payer: COMMERCIAL

## 2025-01-29 VITALS
OXYGEN SATURATION: 95 % | BODY MASS INDEX: 32.17 KG/M2 | HEART RATE: 104 BPM | HEIGHT: 66 IN | WEIGHT: 200.2 LBS | TEMPERATURE: 97.7 F | SYSTOLIC BLOOD PRESSURE: 122 MMHG | DIASTOLIC BLOOD PRESSURE: 86 MMHG | RESPIRATION RATE: 20 BRPM

## 2025-01-29 DIAGNOSIS — R87.611 ATYPICAL SQUAMOUS CELLS CANNOT EXCLUDE HIGH GRADE SQUAMOUS INTRAEPITHELIAL LESION ON CYTOLOGIC SMEAR OF CERVIX (ASC-H): ICD-10-CM

## 2025-01-29 DIAGNOSIS — Z12.4 CERVICAL CANCER SCREENING: Primary | ICD-10-CM

## 2025-01-29 PROCEDURE — G0124 SCREEN C/V THIN LAYER BY MD: HCPCS | Mod: KX | Performed by: PATHOLOGY

## 2025-01-29 PROCEDURE — 99213 OFFICE O/P EST LOW 20 MIN: CPT | Performed by: STUDENT IN AN ORGANIZED HEALTH CARE EDUCATION/TRAINING PROGRAM

## 2025-01-29 PROCEDURE — G0145 SCR C/V CYTO,THINLAYER,RESCR: HCPCS | Mod: KX | Performed by: STUDENT IN AN ORGANIZED HEALTH CARE EDUCATION/TRAINING PROGRAM

## 2025-01-29 PROCEDURE — 87624 HPV HI-RISK TYP POOLED RSLT: CPT | Mod: KX | Performed by: STUDENT IN AN ORGANIZED HEALTH CARE EDUCATION/TRAINING PROGRAM

## 2025-01-29 ASSESSMENT — PATIENT HEALTH QUESTIONNAIRE - PHQ9: SUM OF ALL RESPONSES TO PHQ QUESTIONS 1-9: 7

## 2025-01-29 NOTE — PROGRESS NOTES
"  Assessment & Plan     {Diag Picklist:017658}          Depression Screening Follow Up        1/29/2025     3:12 PM   PHQ   PHQ-9 Total Score 7   Q9: Thoughts of better off dead/self-harm past 2 weeks Several days     {Last PHQ9 Response (Optional):149791}      { Link to C-SSRS ([facility]) Flowsheet :068703}  {C-SSRS results from today (Required):501131}      {After C-SRSS completed-Select to see recommended follow up based on results (Optional):637320}  Follow Up Actions Taken  {ACTIONS TAKEN:663179::\"Crisis resource information provided in the After Visit Summary\"}    Discussed the following ways the patient can remain in a safe environment:  {SAFE ENVIRONMENT:742801}    {FOLLOW UP PLANS (Optional) Includes COVID19 Treatment Plan:461512}    No follow-ups on file.    Vivi Godwin is a 29 year old, presenting for the following health issues:  RECHECK (Pap smear) and Recheck Medication  {(!) Visit Details have not yet been documented.  Please enter Visit Details and then use this list to pull in documentation. (Optional):445993}  HPI     {MA/LPN/RN Pre-Provider Visit Orders- hCG/UA/Strep (Optional):020005}  {SUPERLIST (Optional):101905}  {additonal problems for provider to add (Optional):142510}    {ROS Picklists (Optional):599602}      Objective    /86   Pulse 104   Temp 97.7  F (36.5  C) (Oral)   Resp 20   Ht 1.676 m (5' 6\")   Wt 90.8 kg (200 lb 3.2 oz)   SpO2 95%   BMI 32.31 kg/m    Body mass index is 32.31 kg/m .  Physical Exam   {Exam List (Optional):353178}    {Diagnostic Test Results (Optional):244990}        Signed Electronically by: Ilene Manzo DO  {Email feedback regarding this note to [email or web address]   :909504}  "

## 2025-01-30 LAB
HPV HR 12 DNA CVX QL NAA+PROBE: NEGATIVE
HPV16 DNA CVX QL NAA+PROBE: NEGATIVE
HPV18 DNA CVX QL NAA+PROBE: NEGATIVE
HUMAN PAPILLOMA VIRUS FINAL DIAGNOSIS: NORMAL

## 2025-02-05 LAB
BKR AP ASSOCIATED HPV REPORT: ABNORMAL
BKR LAB AP GYN ADEQUACY: ABNORMAL
BKR LAB AP GYN INTERPRETATION: ABNORMAL
BKR LAB AP PREVIOUS ABNL DX: ABNORMAL
BKR LAB AP PREVIOUS ABNORMAL: ABNORMAL
PATH REPORT.COMMENTS IMP SPEC: ABNORMAL
PATH REPORT.COMMENTS IMP SPEC: ABNORMAL
PATH REPORT.RELEVANT HX SPEC: ABNORMAL

## 2025-05-08 ENCOUNTER — TELEPHONE (OUTPATIENT)
Dept: DERMATOLOGY | Facility: CLINIC | Age: 30
End: 2025-05-08
Payer: COMMERCIAL

## 2025-05-08 NOTE — TELEPHONE ENCOUNTER
Patient Details  Patient's Full Name  Tato Adler  Patient's Date of Birth  1995  Patient's Phone Number  [phone]  Patient's Email ID  [email or web address]  Has the patient visited [facility] in the past 3 years?  Yes  Address Details  Address  [address] [place]  [address]  Appointment Preferences  Reason for appointment  Ear lobe concern Hair concern  Preferred Provider  Fawn Gomez  Preferred Location  [facility]  Preferred Visit Type  In-person   Services   Do you need an  for your appointment?  No  Billing Preference  Which billing situation applies to the patient?  Patient has insurance  Insurance Information  Insurance Provider Name  Other - [employer]  Member ID/ Policy Number  [id number]  Group Number  [id number]  Insurance Contact for Provider  [phone]  Policy [place]  Is the patient the Insurance Policy gilbert/subscriber?  Yes, patient is the policy gilbert  Callback Preferences  Could you share your preferred callback time with us?  No, I don't have a preference

## 2025-05-23 DIAGNOSIS — F64.9 GENDER DYSPHORIA: ICD-10-CM

## 2025-05-27 RX ORDER — TESTOSTERONE CYPIONATE 200 MG/ML
50 INJECTION, SOLUTION INTRAMUSCULAR WEEKLY
Qty: 4 ML | Refills: 5 | Status: SHIPPED | OUTPATIENT
Start: 2025-05-27